# Patient Record
Sex: FEMALE | Race: BLACK OR AFRICAN AMERICAN | NOT HISPANIC OR LATINO | Employment: UNEMPLOYED | URBAN - METROPOLITAN AREA
[De-identification: names, ages, dates, MRNs, and addresses within clinical notes are randomized per-mention and may not be internally consistent; named-entity substitution may affect disease eponyms.]

---
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
## 2022-02-23 ENCOUNTER — OFFICE VISIT (OUTPATIENT)
Dept: URGENT CARE | Facility: CLINIC | Age: 22
End: 2022-02-23
Payer: MEDICAID

## 2022-02-23 VITALS
OXYGEN SATURATION: 99 % | HEIGHT: 62 IN | TEMPERATURE: 98 F | RESPIRATION RATE: 16 BRPM | DIASTOLIC BLOOD PRESSURE: 96 MMHG | BODY MASS INDEX: 53.92 KG/M2 | SYSTOLIC BLOOD PRESSURE: 136 MMHG | HEART RATE: 107 BPM | WEIGHT: 293 LBS

## 2022-02-23 DIAGNOSIS — J30.9 ALLERGIC RHINITIS, UNSPECIFIED SEASONALITY, UNSPECIFIED TRIGGER: Primary | ICD-10-CM

## 2022-02-23 LAB
CTP QC/QA: YES
SARS-COV-2 RDRP RESP QL NAA+PROBE: NEGATIVE

## 2022-02-23 PROCEDURE — 3075F SYST BP GE 130 - 139MM HG: CPT | Mod: CPTII,S$GLB,, | Performed by: NURSE PRACTITIONER

## 2022-02-23 PROCEDURE — 3080F PR MOST RECENT DIASTOLIC BLOOD PRESSURE >= 90 MM HG: ICD-10-PCS | Mod: CPTII,S$GLB,, | Performed by: NURSE PRACTITIONER

## 2022-02-23 PROCEDURE — 1159F MED LIST DOCD IN RCRD: CPT | Mod: CPTII,S$GLB,, | Performed by: NURSE PRACTITIONER

## 2022-02-23 PROCEDURE — 3008F PR BODY MASS INDEX (BMI) DOCUMENTED: ICD-10-PCS | Mod: CPTII,S$GLB,, | Performed by: NURSE PRACTITIONER

## 2022-02-23 PROCEDURE — 3080F DIAST BP >= 90 MM HG: CPT | Mod: CPTII,S$GLB,, | Performed by: NURSE PRACTITIONER

## 2022-02-23 PROCEDURE — 3075F PR MOST RECENT SYSTOLIC BLOOD PRESS GE 130-139MM HG: ICD-10-PCS | Mod: CPTII,S$GLB,, | Performed by: NURSE PRACTITIONER

## 2022-02-23 PROCEDURE — 1160F PR REVIEW ALL MEDS BY PRESCRIBER/CLIN PHARMACIST DOCUMENTED: ICD-10-PCS | Mod: CPTII,S$GLB,, | Performed by: NURSE PRACTITIONER

## 2022-02-23 PROCEDURE — 1160F RVW MEDS BY RX/DR IN RCRD: CPT | Mod: CPTII,S$GLB,, | Performed by: NURSE PRACTITIONER

## 2022-02-23 PROCEDURE — 1159F PR MEDICATION LIST DOCUMENTED IN MEDICAL RECORD: ICD-10-PCS | Mod: CPTII,S$GLB,, | Performed by: NURSE PRACTITIONER

## 2022-02-23 PROCEDURE — 99213 PR OFFICE/OUTPT VISIT, EST, LEVL III, 20-29 MIN: ICD-10-PCS | Mod: S$GLB,,, | Performed by: NURSE PRACTITIONER

## 2022-02-23 PROCEDURE — 99213 OFFICE O/P EST LOW 20 MIN: CPT | Mod: S$GLB,,, | Performed by: NURSE PRACTITIONER

## 2022-02-23 PROCEDURE — 3008F BODY MASS INDEX DOCD: CPT | Mod: CPTII,S$GLB,, | Performed by: NURSE PRACTITIONER

## 2022-02-23 PROCEDURE — U0002 COVID-19 LAB TEST NON-CDC: HCPCS | Mod: QW,S$GLB,, | Performed by: NURSE PRACTITIONER

## 2022-02-23 PROCEDURE — U0002: ICD-10-PCS | Mod: QW,S$GLB,, | Performed by: NURSE PRACTITIONER

## 2022-02-24 NOTE — PROGRESS NOTES
"Subjective:       Patient ID: Kaylie Dangelo is a 21 y.o. female.    Vitals:  height is 5' 2" (1.575 m) and weight is 183.3 kg (404 lb) (abnormal). Her temperature is 98.3 °F (36.8 °C). Her blood pressure is 136/96 (abnormal) and her pulse is 107. Her respiration is 16 and oxygen saturation is 99%.     Chief Complaint: URI    Pt has had cough x 2 days.  Patient states her dad recently passed and to able to live in [facility] house she needs to have negative COVID test.    URI   This is a new problem. The current episode started today. The problem has been unchanged. There has been no fever. Associated symptoms include coughing. She has tried nothing for the symptoms. The treatment provided no relief.       Respiratory: Positive for cough.        Objective:      Physical Exam   Constitutional: She is oriented to person, place, and time. She appears well-developed. She is cooperative.  Non-toxic appearance. She does not appear ill. No distress. obesity  HENT:   Head: Normocephalic and atraumatic.   Ears:   Right Ear: Hearing, tympanic membrane, external ear and ear canal normal.   Left Ear: Hearing, tympanic membrane, external ear and ear canal normal.   Nose: Nose normal. No mucosal edema, rhinorrhea or nasal deformity. No epistaxis. Right sinus exhibits no maxillary sinus tenderness and no frontal sinus tenderness. Left sinus exhibits no maxillary sinus tenderness and no frontal sinus tenderness.   Mouth/Throat: Uvula is midline, oropharynx is clear and moist and mucous membranes are normal. No trismus in the jaw. Normal dentition. No uvula swelling. No oropharyngeal exudate, posterior oropharyngeal edema or posterior oropharyngeal erythema.   Eyes: Conjunctivae and lids are normal. No scleral icterus.   Neck: Trachea normal and phonation normal. Neck supple. No edema present. No erythema present. No neck rigidity present.   Cardiovascular: Normal rate, regular rhythm, normal heart sounds and normal pulses. "   Pulmonary/Chest: Effort normal and breath sounds normal. No respiratory distress. She has no decreased breath sounds. She has no rhonchi.   Lungs clear    Comments: Lungs clear    Abdominal: Normal appearance.   Musculoskeletal: Normal range of motion.         General: No deformity. Normal range of motion.   Neurological: She is alert and oriented to person, place, and time. She exhibits normal muscle tone. Coordination normal.   Skin: Skin is warm, dry, intact, not diaphoretic and not pale.   Psychiatric: Her speech is normal and behavior is normal. Judgment and thought content normal.   Nursing note and vitals reviewed.        Results for orders placed or performed in visit on 02/23/22   POCT COVID-19 Rapid Screening   Result Value Ref Range    POC Rapid COVID Negative Negative     Acceptable Yes        Assessment:       1. Allergic rhinitis, unspecified seasonality, unspecified trigger          Plan:         Allergic rhinitis, unspecified seasonality, unspecified trigger  -     POCT COVID-19 Rapid Screening                    Patient Instructions   START ZYRTEC AND FLONASE    COVID TEST WAS NEGATIVE TODAY IN CLINIC

## 2022-06-24 ENCOUNTER — HOSPITAL ENCOUNTER (EMERGENCY)
Facility: HOSPITAL | Age: 22
Discharge: HOME | End: 2022-06-24
Attending: EMERGENCY MEDICINE
Payer: MEDICAID

## 2022-06-24 VITALS
DIASTOLIC BLOOD PRESSURE: 60 MMHG | HEIGHT: 62 IN | OXYGEN SATURATION: 97 % | RESPIRATION RATE: 18 BRPM | HEART RATE: 85 BPM | SYSTOLIC BLOOD PRESSURE: 127 MMHG | BODY MASS INDEX: 53.92 KG/M2 | TEMPERATURE: 98 F | WEIGHT: 293 LBS

## 2022-06-24 DIAGNOSIS — J03.90 TONSILLITIS: Primary | ICD-10-CM

## 2022-06-24 PROCEDURE — 99284 EMERGENCY DEPT VISIT MOD MDM: CPT

## 2022-06-24 PROCEDURE — 99284 EMERGENCY DEPT VISIT MOD MDM: CPT | Mod: ,,, | Performed by: EMERGENCY MEDICINE

## 2022-06-24 PROCEDURE — 99284 PR EMERGENCY DEPT VISIT,LEVEL IV: ICD-10-PCS | Mod: ,,, | Performed by: EMERGENCY MEDICINE

## 2022-06-24 RX ORDER — PREDNISONE 20 MG/1
40 TABLET ORAL DAILY
Qty: 10 TABLET | Refills: 0 | Status: SHIPPED | OUTPATIENT
Start: 2022-06-24 | End: 2022-06-29

## 2022-06-24 RX ORDER — CLINDAMYCIN HYDROCHLORIDE 150 MG/1
300 CAPSULE ORAL 4 TIMES DAILY
Qty: 56 CAPSULE | Refills: 0 | Status: SHIPPED | OUTPATIENT
Start: 2022-06-24 | End: 2022-07-01

## 2022-06-24 NOTE — ED PROVIDER NOTES
Encounter Date: 6/24/2022    SCRIBE #1 NOTE: I, Shawn Hdz, am scribing for, and in the presence of,  Georges Goel MD. I have scribed the entire note.       History     Chief Complaint   Patient presents with    Abscess     Dx'd w/ throat abscess today at . Sent to the ER for further medical evaluation and needing to be rx'd PO abx. Pt denies difficulty swallowing, choking, N/V.      21 y.o. female with a PMHx of  ADHD, asthma, depression, HTN, presents with a chief complaint of sore throat onset one day ago. She notes she smoked and irritated her throat. She states it is painful to swallow. She presented to an urgent care and was told she had a peritonsillar abscess in her throat. She was referred to the ED for antibiotics and further evaluation. She denies any know allergies. Patient reports no other identifying, alleviating, or exacerbating factors and denies any other associated symptoms at this time.            The history is provided by medical records and the patient. No  was used.     Review of patient's allergies indicates:   Allergen Reactions    Citrus and derivatives     Penicillins     Lamotrigine Rash     Reported by mother on 12/19/18     Past Medical History:   Diagnosis Date    ADHD, predominantly hyperactive-impulsive subtype     Asthma     Bipolar 1 disorder     Depression     Hypertension     Insomnia      History reviewed. No pertinent surgical history.  Family History   Problem Relation Age of Onset    Diabetes Mother     Heart failure Mother      Social History     Tobacco Use    Smoking status: Former Smoker    Smokeless tobacco: Never Used   Substance Use Topics    Alcohol use: No    Drug use: Yes     Types: Marijuana     Review of Systems   Constitutional: Negative for chills and fever.   HENT: Positive for sore throat. Negative for nosebleeds.    Eyes: Negative for visual disturbance.   Respiratory: Negative for shortness of breath.     Cardiovascular: Negative for chest pain.   Gastrointestinal: Negative for vomiting.   Genitourinary: Negative for frequency.   Musculoskeletal: Negative for joint swelling.   Skin: Negative for rash.   Neurological: Negative for numbness.   Hematological: Does not bruise/bleed easily.   Psychiatric/Behavioral: Negative for confusion.       Physical Exam     Initial Vitals [06/24/22 1018]   BP Pulse Resp Temp SpO2   127/60 85 18 98 °F (36.7 °C) 97 %      MAP       --         Physical Exam    Constitutional: She appears well-developed and well-nourished. She is not diaphoretic. No distress.   Patient appears in no acute distress.     HENT:   Head: Normocephalic and atraumatic.   Her speech is clear. She is handling her secretions. She has exudate on her tonsils. No sign of peritonsillar abscess. No swelling to parotid glands.     Eyes: Conjunctivae are normal.   Neck: Neck supple.    She has no tenderness to anterior neck.   Normal range of motion.  Cardiovascular: Normal rate, regular rhythm, normal heart sounds and intact distal pulses.   Pulmonary/Chest: Breath sounds normal. No respiratory distress.   Abdominal: Abdomen is soft. Bowel sounds are normal.   Musculoskeletal:         General: No edema. Normal range of motion.      Cervical back: Normal range of motion and neck supple.     Neurological: She is alert. She has normal strength. GCS eye subscore is 4. GCS verbal subscore is 5. GCS motor subscore is 6.   Skin: Skin is warm. No rash noted.   Psychiatric: She has a normal mood and affect.             ED Course   Procedures  Labs Reviewed - No data to display       Imaging Results    None          Medications - No data to display  Medical Decision Making:   History:   Old Medical Records: I decided to obtain old medical records.  Initial Assessment:   Patient presents with tonsillitis, highly doubt abscess or infection. I will discharge with amoxicillin and steroid.   ED Management:  Plan in assessment is to  discharge patient home with Prednisone 20 mg and clindamycin 150 mg.          Scribe Attestation:   Scribe #1: I performed the above scribed service and the documentation accurately describes the services I performed. I attest to the accuracy of the note.                 Clinical Impression:   Final diagnoses:  [J03.90] Tonsillitis (Primary)          ED Disposition Condition    Discharge Stable        ED Prescriptions     Medication Sig Dispense Start Date End Date Auth. Provider    predniSONE (DELTASONE) 20 MG tablet Take 2 tablets (40 mg total) by mouth once daily. for 5 days 10 tablet 6/24/2022 6/29/2022 Georges Goel MD    clindamycin (CLEOCIN) 150 MG capsule Take 2 capsules (300 mg total) by mouth 4 (four) times daily. for 7 days 56 capsule 6/24/2022 7/1/2022 Georges Goel MD        Follow-up Information     Follow up With Specialties Details Why Contact Info Additional Information    [facility] Internal [facility] Schedule an appointment as soon as possible for a visit in 2 days For Primary Care and Emergency Department Follow-up [id number]  [facility] [id number] [phone] [facility] Please park in surface lot and check in at central registration desk    [facility] Emergency Medicine  If symptoms worsen [id number]  [facility] [id number] [phone]            Georges Goel MD  06/24/22 0632

## 2022-06-24 NOTE — ED NOTES
Patient identifiers verified and correct for MS Dangelo  C/C: Throat pain SEE NN  APPEARANCE: awake and alert in NAD.  SKIN: warm, dry and intact. No breakdown or bruising.  MUSCULOSKELETAL: Patient moving all extremities spontaneously, no obvious swelling or deformities noted. Ambulates independently.  RESPIRATORY: Denies shortness of breath.Respirations unlabored. Denies cough Denies fevers.   CARDIAC: Denies CP, 2+ distal pulses; no peripheral edema  ABDOMEN: S/ND/NT, Denies nausea  : voids spontaneously, denies difficulty  Neurologic: AAO x 4; follows commands equal strength in all extremities; denies numbness/tingling. Denies dizziness  Denies weakness

## 2022-06-24 NOTE — ED NOTES
Patient states abscess to throat, pain with swallowing and eating x 2 days, gargling with peroxide. Denies SOB, denies fevers. No current antibiotics, denies any routine meds.

## 2022-07-25 ENCOUNTER — OFFICE VISIT (OUTPATIENT)
Dept: OBSTETRICS AND GYNECOLOGY | Facility: CLINIC | Age: 22
End: 2022-07-25
Payer: MEDICAID

## 2022-07-25 VITALS — WEIGHT: 206.56 LBS | HEIGHT: 62 IN | BODY MASS INDEX: 38.01 KG/M2

## 2022-07-25 DIAGNOSIS — Z11.3 SCREEN FOR STD (SEXUALLY TRANSMITTED DISEASE): Primary | ICD-10-CM

## 2022-07-25 DIAGNOSIS — Z31.69 PRE-CONCEPTION COUNSELING: ICD-10-CM

## 2022-07-25 LAB
B-HCG UR QL: NEGATIVE
CTP QC/QA: YES

## 2022-07-25 PROCEDURE — 99213 PR OFFICE/OUTPT VISIT, EST, LEVL III, 20-29 MIN: ICD-10-PCS | Mod: S$PBB,,, | Performed by: OBSTETRICS & GYNECOLOGY

## 2022-07-25 PROCEDURE — 1159F MED LIST DOCD IN RCRD: CPT | Mod: CPTII,,, | Performed by: OBSTETRICS & GYNECOLOGY

## 2022-07-25 PROCEDURE — 99213 OFFICE O/P EST LOW 20 MIN: CPT | Mod: PBBFAC | Performed by: OBSTETRICS & GYNECOLOGY

## 2022-07-25 PROCEDURE — 99999 PR PBB SHADOW E&M-EST. PATIENT-LVL III: ICD-10-PCS | Mod: PBBFAC,,, | Performed by: OBSTETRICS & GYNECOLOGY

## 2022-07-25 PROCEDURE — 99999 PR PBB SHADOW E&M-EST. PATIENT-LVL III: CPT | Mod: PBBFAC,,, | Performed by: OBSTETRICS & GYNECOLOGY

## 2022-07-25 PROCEDURE — 1159F PR MEDICATION LIST DOCUMENTED IN MEDICAL RECORD: ICD-10-PCS | Mod: CPTII,,, | Performed by: OBSTETRICS & GYNECOLOGY

## 2022-07-25 PROCEDURE — 1160F RVW MEDS BY RX/DR IN RCRD: CPT | Mod: CPTII,,, | Performed by: OBSTETRICS & GYNECOLOGY

## 2022-07-25 PROCEDURE — 87591 N.GONORRHOEAE DNA AMP PROB: CPT | Performed by: OBSTETRICS & GYNECOLOGY

## 2022-07-25 PROCEDURE — 1160F PR REVIEW ALL MEDS BY PRESCRIBER/CLIN PHARMACIST DOCUMENTED: ICD-10-PCS | Mod: CPTII,,, | Performed by: OBSTETRICS & GYNECOLOGY

## 2022-07-25 PROCEDURE — 3008F BODY MASS INDEX DOCD: CPT | Mod: CPTII,,, | Performed by: OBSTETRICS & GYNECOLOGY

## 2022-07-25 PROCEDURE — 81025 URINE PREGNANCY TEST: CPT | Mod: PBBFAC | Performed by: OBSTETRICS & GYNECOLOGY

## 2022-07-25 PROCEDURE — 99213 OFFICE O/P EST LOW 20 MIN: CPT | Mod: S$PBB,,, | Performed by: OBSTETRICS & GYNECOLOGY

## 2022-07-25 PROCEDURE — 3008F PR BODY MASS INDEX (BMI) DOCUMENTED: ICD-10-PCS | Mod: CPTII,,, | Performed by: OBSTETRICS & GYNECOLOGY

## 2022-07-25 PROCEDURE — 87491 CHLMYD TRACH DNA AMP PROBE: CPT | Performed by: OBSTETRICS & GYNECOLOGY

## 2022-07-25 NOTE — ASSESSMENT & PLAN NOTE
Declines pelvic exam. STD screening via urine today and blood work ordered. UPT negative in clinic.

## 2022-07-25 NOTE — PROGRESS NOTES
Chief Complaint   Patient presents with    Annual Exam       HPI:   21 y.o.  here today for STD check. Recently sexually active for the first time and her most recent cycle was irregular. UPT negative today. Declines a pelvic exam at this time. Denies vaginal discharge, irritation, odor, urinary complaints, or change in bowel habits. Denies major medical problems and not currently taking any medications. Desires pregnancy.    LMP Dates from Last 1 Encounters:   LMP: 2022       Labs / Significant Studies  Pap: N/A    No visits with results within 3 Month(s) from this visit.   Latest known visit with results is:   Office Visit on 2022   Component Date Value Ref Range Status    POC Rapid COVID 2022 Negative  Negative Final     Acceptable 2022 Yes   Final        Past Medical History:   Diagnosis Date    ADHD, predominantly hyperactive-impulsive subtype     Asthma     Bipolar 1 disorder     Depression     Hypertension     Insomnia      History reviewed. No pertinent surgical history.    Current Outpatient Medications:     acetaminophen (TYLENOL) 650 MG TbSR, Take 1 tablet (650 mg total) by mouth every 8 (eight) hours. (Patient not taking: Reported on 2022), Disp: 20 tablet, Rfl: 0    acyclovir (ZOVIRAX) 400 MG tablet, Take 1 tablet (400 mg total) by mouth 4 (four) times daily. for 10 days, Disp: 40 tablet, Rfl: 0    clonidine (CATAPRES) 0.1 MG tablet, Take 0.1 mg by mouth every evening., Disp: , Rfl:     fluoxetine (PROZAC) 40 MG capsule, Take 40 mg by mouth once daily., Disp: , Rfl:     hydrochlorothiazide (HYDRODIURIL) 12.5 MG Tab, Take 12.5 mg by mouth once daily., Disp: , Rfl:     ibuprofen (ADVIL,MOTRIN) 600 MG tablet, Take 1 tablet (600 mg total) by mouth every 6 (six) hours as needed for Pain. (Patient not taking: No sig reported), Disp: 20 tablet, Rfl: 0    lamotrigine (LAMICTAL) 100 MG tablet, Take 100 mg by mouth once daily., Disp: , Rfl:      medroxyPROGESTERone (PROVERA) 10 MG tablet, Take 1 tablet (10 mg total) by mouth once daily., Disp: 10 tablet, Rfl: 0    melatonin 5 mg TbDL, Take by mouth., Disp: , Rfl:     METHYLPHENIDATE HCL (CONCERTA ORAL), Take 45 mg by mouth., Disp: , Rfl:     montelukast (SINGULAIR) 5 MG chewable tablet, Take 5 mg by mouth every evening., Disp: , Rfl:     mupirocin (BACTROBAN) 2 % ointment, Apply topically 3 (three) times daily. (Patient not taking: Reported on 2022), Disp: 30 g, Rfl: 0    paroxetine (PAXIL-CR) 25 MG 24 hr tablet, Take 25 mg by mouth every morning., Disp: , Rfl:   Review of patient's allergies indicates:   Allergen Reactions    Citrus and derivatives     Penicillins     Lamotrigine Rash     Reported by mother on 18     OB History    Para Term  AB Living   0 0 0 0 0 0   SAB IAB Ectopic Multiple Live Births   0 0 0 0 0     Social History     Tobacco Use    Smoking status: Former Smoker    Smokeless tobacco: Never Used   Substance Use Topics    Alcohol use: No    Drug use: Not Currently     Types: Marijuana     Family History   Problem Relation Age of Onset    Diabetes Mother     Heart failure Mother     Breast cancer Neg Hx     Colon cancer Neg Hx     Ovarian cancer Neg Hx        Review of Systems   Negative except as in HPI      Physical Exam   There were no vitals filed for this visit.  Body mass index is 37.78 kg/m².    Physical Exam  Constitutional:       General: She is not in acute distress.     Appearance: Normal appearance.   HENT:      Head: Normocephalic and atraumatic.   Pulmonary:      Effort: Pulmonary effort is normal.   Musculoskeletal:         General: Normal range of motion.      Cervical back: Normal range of motion.   Neurological:      General: No focal deficit present.      Mental Status: She is alert and oriented to person, place, and time.   Skin:     General: Skin is warm and dry.   Psychiatric:         Mood and Affect: Mood normal.          Behavior: Behavior normal.         Thought Content: Thought content normal.          Labs reviewed: TSH, prolactin, DHEA, 17-OHP, testosterone    ASSESSMENT:   Patient Active Problem List   Diagnosis    Depression    Bipolar 1 disorder    Abnormal weight gain    Acanthosis nigricans    Suspected sleep apnea    Obstructive sleep apnea syndrome    Screen for STD (sexually transmitted disease)    Pre-conception counseling       PLAN:  Problem List Items Addressed This Visit        Renal/    Pre-conception counseling    Current Assessment & Plan     Patient instructed to track cycles and have timed intercourse if attempting to conceive. Start a prenatal vitamin, avoid tobacco, alcohol, and other substance, eat a well-balanced diet with lots of fruits, vegetables, and lean meats, and exercise 5 times weekly for 30 minutes per day.               ID    Screen for STD (sexually transmitted disease) - Primary    Current Assessment & Plan     Declines pelvic exam. STD screening via urine today and blood work ordered. UPT negative in clinic.            Relevant Orders    POCT Urine Pregnancy (Completed)    C. trachomatis/N. gonorrhoeae by AMP DNA    HIV 1/2 Ag/Ab (4th Gen)    RPR    Hepatitis C Antibody    Hepatitis B Surface Antigen           Total time spent on this encounter was 20 minutes.  This includes preparing to see the patient;  obtaining/reviewing separately obtained history;  performing a medical exam and/or evaluation;   counseling/educating the patient;  ordering medications, tests, of procedures;   referring/communicating with other health care professionals;  EMR documentation;  interpreting/communicating results to the patient;  and/or care coordination.    Follow up if symptoms worsen or fail to improve.       Ladonna Tsai MD  Department of Obstetrics & Gynecology  [facility]

## 2022-07-25 NOTE — ASSESSMENT & PLAN NOTE
Patient instructed to track cycles and have timed intercourse if attempting to conceive. Start a prenatal vitamin, avoid tobacco, alcohol, and other substance, eat a well-balanced diet with lots of fruits, vegetables, and lean meats, and exercise 5 times weekly for 30 minutes per day.

## 2022-07-28 LAB
C TRACH DNA SPEC QL NAA+PROBE: NOT DETECTED
N GONORRHOEA DNA SPEC QL NAA+PROBE: NOT DETECTED

## 2022-08-05 ENCOUNTER — TELEPHONE (OUTPATIENT)
Dept: OBSTETRICS AND GYNECOLOGY | Facility: CLINIC | Age: 22
End: 2022-08-05
Payer: MEDICAID

## 2022-08-05 DIAGNOSIS — Z34.90 PREGNANCY: Primary | ICD-10-CM

## 2022-08-18 ENCOUNTER — HOSPITAL ENCOUNTER (OUTPATIENT)
Dept: PERINATAL CARE | Facility: OTHER | Age: 22
Discharge: HOME | End: 2022-08-18
Attending: OBSTETRICS & GYNECOLOGY
Payer: MEDICAID

## 2022-08-18 ENCOUNTER — OFFICE VISIT (OUTPATIENT)
Dept: OBSTETRICS AND GYNECOLOGY | Facility: CLINIC | Age: 22
End: 2022-08-18
Payer: MEDICAID

## 2022-08-18 VITALS
WEIGHT: 293 LBS | BODY MASS INDEX: 53.92 KG/M2 | SYSTOLIC BLOOD PRESSURE: 127 MMHG | DIASTOLIC BLOOD PRESSURE: 60 MMHG | HEIGHT: 62 IN

## 2022-08-18 DIAGNOSIS — Z34.90 PREGNANCY: ICD-10-CM

## 2022-08-18 DIAGNOSIS — Z36.82 ENCOUNTER FOR ANTENATAL SCREENING FOR NUCHAL TRANSLUCENCY: ICD-10-CM

## 2022-08-18 DIAGNOSIS — N91.2 AMENORRHEA: Primary | ICD-10-CM

## 2022-08-18 LAB
B-HCG UR QL: POSITIVE
CTP QC/QA: YES

## 2022-08-18 PROCEDURE — 87086 URINE CULTURE/COLONY COUNT: CPT | Performed by: REGISTERED NURSE

## 2022-08-18 PROCEDURE — 1159F MED LIST DOCD IN RCRD: CPT | Mod: CPTII,,, | Performed by: REGISTERED NURSE

## 2022-08-18 PROCEDURE — 87591 N.GONORRHOEAE DNA AMP PROB: CPT | Performed by: REGISTERED NURSE

## 2022-08-18 PROCEDURE — 3078F PR MOST RECENT DIASTOLIC BLOOD PRESSURE < 80 MM HG: ICD-10-PCS | Mod: CPTII,,, | Performed by: REGISTERED NURSE

## 2022-08-18 PROCEDURE — 1159F PR MEDICATION LIST DOCUMENTED IN MEDICAL RECORD: ICD-10-PCS | Mod: CPTII,,, | Performed by: REGISTERED NURSE

## 2022-08-18 PROCEDURE — 3078F DIAST BP <80 MM HG: CPT | Mod: CPTII,,, | Performed by: REGISTERED NURSE

## 2022-08-18 PROCEDURE — 99213 PR OFFICE/OUTPT VISIT, EST, LEVL III, 20-29 MIN: ICD-10-PCS | Mod: S$PBB,TH,, | Performed by: REGISTERED NURSE

## 2022-08-18 PROCEDURE — 81025 URINE PREGNANCY TEST: CPT | Mod: PBBFAC

## 2022-08-18 PROCEDURE — 76801 OB US < 14 WKS SINGLE FETUS: CPT | Mod: 26,,, | Performed by: OBSTETRICS & GYNECOLOGY

## 2022-08-18 PROCEDURE — 76801 US OB/GYN PROCEDURE (VIEWPOINT): ICD-10-PCS | Mod: 26,,, | Performed by: OBSTETRICS & GYNECOLOGY

## 2022-08-18 PROCEDURE — 3008F BODY MASS INDEX DOCD: CPT | Mod: CPTII,,, | Performed by: REGISTERED NURSE

## 2022-08-18 PROCEDURE — 99213 OFFICE O/P EST LOW 20 MIN: CPT | Mod: PBBFAC,25,TH

## 2022-08-18 PROCEDURE — 99999 PR PBB SHADOW E&M-EST. PATIENT-LVL III: ICD-10-PCS | Mod: PBBFAC,,,

## 2022-08-18 PROCEDURE — 99999 PR PBB SHADOW E&M-EST. PATIENT-LVL III: CPT | Mod: PBBFAC,,,

## 2022-08-18 PROCEDURE — 76801 OB US < 14 WKS SINGLE FETUS: CPT

## 2022-08-18 PROCEDURE — 3074F PR MOST RECENT SYSTOLIC BLOOD PRESSURE < 130 MM HG: ICD-10-PCS | Mod: CPTII,,, | Performed by: REGISTERED NURSE

## 2022-08-18 PROCEDURE — 1160F RVW MEDS BY RX/DR IN RCRD: CPT | Mod: CPTII,,, | Performed by: REGISTERED NURSE

## 2022-08-18 PROCEDURE — 99213 OFFICE O/P EST LOW 20 MIN: CPT | Mod: S$PBB,TH,, | Performed by: REGISTERED NURSE

## 2022-08-18 PROCEDURE — 1160F PR REVIEW ALL MEDS BY PRESCRIBER/CLIN PHARMACIST DOCUMENTED: ICD-10-PCS | Mod: CPTII,,, | Performed by: REGISTERED NURSE

## 2022-08-18 PROCEDURE — 3008F PR BODY MASS INDEX (BMI) DOCUMENTED: ICD-10-PCS | Mod: CPTII,,, | Performed by: REGISTERED NURSE

## 2022-08-18 PROCEDURE — 3074F SYST BP LT 130 MM HG: CPT | Mod: CPTII,,, | Performed by: REGISTERED NURSE

## 2022-08-18 PROCEDURE — 87491 CHLMYD TRACH DNA AMP PROBE: CPT | Performed by: REGISTERED NURSE

## 2022-08-18 NOTE — PROGRESS NOTES
CC: Positive Pregnancy Test    HISTORY OF PRESENT ILLNESS:    Kaylie Dangelo is a 21 y.o. female, ,  Presents today for a routine exam complaining of amenorrhea and positive home urine pregnancy test.  Patient's last menstrual period was 2022 (exact date).   She is not currently on any contraception.  Reports nausea. Reports breast tenderness. Denies vaginal bleeding and pelvic pain. She reports that she is not currently taking any medications. She reports history of ADHD, depression and asthma as a youth, which have all resolved. She does report sleep apnea currently. Denies past surgical hx. She reports her older brother was born with a heart murmur (no surgery). This is her first pregnancy.     Abdominal ultrasound prior to this visit showed (pt declines TVUS):   A probable intrauterine gestational sac is seen. The mean sac diameter (MSD) is 16.1 mm. Neither a yolk sac nor an embryo is seen within the gestational sac. At a MSD   of less than 25 mm, the absence of a yolk sac and embryo may not be pathologic.   The tentative diagnosis, based on ultrasound findings alone, is a pregnancy of uncertain viability.   Other clinical data, as available, should be used in conjunction with ultrasound findings to make a final determination regarding viability.   If a follow-up study will be ordered to assess viability and to assign REGINA, recommend that this study not be done any sooner than 2 weeks from the date of today's exam.   The maternal adnexae are normal in appearance. The amount of free fluid seen in the pelvis is within normal physiologic range.       ROS:  GENERAL: No weight changes. No swelling. No fatigue. No fever.  CARDIOVASCULAR: No chest pain. No shortness of breath. No leg cramps.   NEUROLOGICAL: No headaches. No vision changes.  BREASTS: No pain. No lumps. No discharge.  ABDOMEN: No pain. No diarrhea. No constipation.  REPRODUCTIVE: No abnormal bleeding.   VULVA: No pain. No lesions. No  "itching.  VAGINA: No relaxation. No itching. No odor. No discharge. No lesions.  URINARY: No incontinence. No nocturia. No frequency. No dysuria.    MEDICATIONS AND ALLERGIES:  Reviewed        COMPREHENSIVE GYN HISTORY:  PAP History: Denies abnormal Paps.  Infection History: Denies STDs. Denies PID.  Benign History: Denies uterine fibroids. Denies ovarian cysts. Denies endometriosis. Denies other conditions.  Cancer History: Denies cervical cancer. Denies uterine cancer or hyperplasia. Denies ovarian cancer. Denies vulvar cancer or pre-cancer. Denies vaginal cancer or pre-cancer. Denies breast cancer. Denies colon cancer.  Sexual Activity History: Reports currently being sexually active  Menstrual History: None.  Contraception: None    /60   Ht 5' 2" (1.575 m)   Wt (!) 168.3 kg (371 lb 0.6 oz)   LMP 2022 (Exact Date)   BMI 67.86 kg/m²     PE:  AFFECT: Calm, alert and oriented X 3. Interactive during exam  GENERAL: Appears well-nourished, well-developed, in no acute distress.  PELVIC: Pt declines exam.    PROCEDURES:  UPT Positive  Genprobe  Pap not indicated due to patient's age.      ASSESSMENT/PLAN:  Amenorrhea  Positive urine pregnancy test (REGINA: 23, EGA: 4w1d based on LMP)    -  Routine prenatal care    Nausea and vomiting in pregnancy    -  Education regarding lifestyle and dietary modifications    -  Advised use of B6/Unisom. Pt will notify us if no relief/worsening symptoms, will consider Zofran if needed.      1st TRIMESTER COUNSELING:   Common complaints of pregnancy  HIV and other routine prenatal tests including  genetic screening  Risk factors identified by prenatal history  Oriented to practice - discussed anticipated course of prenatal care & indications for Ultrasound  Childbirth classes/Hospital facilities   Nutrition and weight gain counseling  Toxoplasmosis precautions (Cats/Raw Meat)  Sexual activity and exercise  Environmental/Work hazards  Travel  Tobacco (Ask, " "Advise, Assess, Assist, and Arrange), as well as alcohol and drug use  Use of any medications (Including supplements, Vitamins, Herbs, or OTC Drugs)  Domestic violence  Seat belt use      TERATOLOGY COUNSELING:   Discussed indications and options for aneuploidy screening - pamphlets given    -  Pt desires NT and orders placed  Dating US prior to this appt; see results under "Imaging"  FOLLOW-UP in 4 weeks with TIERNEY Briggs    "

## 2022-08-19 ENCOUNTER — LAB VISIT (OUTPATIENT)
Dept: LAB | Facility: OTHER | Age: 22
End: 2022-08-19
Attending: REGISTERED NURSE
Payer: MEDICAID

## 2022-08-19 DIAGNOSIS — N91.2 AMENORRHEA: ICD-10-CM

## 2022-08-19 LAB
ABO + RH BLD: NORMAL
BASOPHILS # BLD AUTO: 0.01 K/UL (ref 0–0.2)
BASOPHILS NFR BLD: 0.2 % (ref 0–1.9)
BLD GP AB SCN CELLS X3 SERPL QL: NORMAL
DIFFERENTIAL METHOD: ABNORMAL
EOSINOPHIL # BLD AUTO: 0 K/UL (ref 0–0.5)
EOSINOPHIL NFR BLD: 0.5 % (ref 0–8)
ERYTHROCYTE [DISTWIDTH] IN BLOOD BY AUTOMATED COUNT: 13.1 % (ref 11.5–14.5)
HCG INTACT+B SERPL-ACNC: NORMAL MIU/ML
HCT VFR BLD AUTO: 36.2 % (ref 37–48.5)
HGB BLD-MCNC: 12.2 G/DL (ref 12–16)
IMM GRANULOCYTES # BLD AUTO: 0.01 K/UL (ref 0–0.04)
IMM GRANULOCYTES NFR BLD AUTO: 0.2 % (ref 0–0.5)
LYMPHOCYTES # BLD AUTO: 2.2 K/UL (ref 1–4.8)
LYMPHOCYTES NFR BLD: 34.3 % (ref 18–48)
MCH RBC QN AUTO: 27.5 PG (ref 27–31)
MCHC RBC AUTO-ENTMCNC: 33.7 G/DL (ref 32–36)
MCV RBC AUTO: 82 FL (ref 82–98)
MONOCYTES # BLD AUTO: 0.5 K/UL (ref 0.3–1)
MONOCYTES NFR BLD: 8.4 % (ref 4–15)
NEUTROPHILS # BLD AUTO: 3.6 K/UL (ref 1.8–7.7)
NEUTROPHILS NFR BLD: 56.4 % (ref 38–73)
NRBC BLD-RTO: 0 /100 WBC
PLATELET # BLD AUTO: 372 K/UL (ref 150–450)
PMV BLD AUTO: 10.1 FL (ref 9.2–12.9)
PROGEST SERPL-MCNC: 17 NG/ML
RBC # BLD AUTO: 4.43 M/UL (ref 4–5.4)
WBC # BLD AUTO: 6.32 K/UL (ref 3.9–12.7)

## 2022-08-19 PROCEDURE — 86592 SYPHILIS TEST NON-TREP QUAL: CPT | Performed by: REGISTERED NURSE

## 2022-08-19 PROCEDURE — 84144 ASSAY OF PROGESTERONE: CPT | Performed by: REGISTERED NURSE

## 2022-08-19 PROCEDURE — 87389 HIV-1 AG W/HIV-1&-2 AB AG IA: CPT | Performed by: REGISTERED NURSE

## 2022-08-19 PROCEDURE — 85025 COMPLETE CBC W/AUTO DIFF WBC: CPT | Performed by: REGISTERED NURSE

## 2022-08-19 PROCEDURE — 86803 HEPATITIS C AB TEST: CPT | Performed by: REGISTERED NURSE

## 2022-08-19 PROCEDURE — 83020 HEMOGLOBIN ELECTROPHORESIS: CPT | Performed by: REGISTERED NURSE

## 2022-08-19 PROCEDURE — 84702 CHORIONIC GONADOTROPIN TEST: CPT | Performed by: REGISTERED NURSE

## 2022-08-19 PROCEDURE — 86787 VARICELLA-ZOSTER ANTIBODY: CPT | Performed by: REGISTERED NURSE

## 2022-08-19 PROCEDURE — 86762 RUBELLA ANTIBODY: CPT | Performed by: REGISTERED NURSE

## 2022-08-19 PROCEDURE — 87340 HEPATITIS B SURFACE AG IA: CPT | Performed by: REGISTERED NURSE

## 2022-08-19 PROCEDURE — 86850 RBC ANTIBODY SCREEN: CPT | Performed by: REGISTERED NURSE

## 2022-08-19 PROCEDURE — 36415 COLL VENOUS BLD VENIPUNCTURE: CPT | Performed by: REGISTERED NURSE

## 2022-08-20 LAB
BACTERIA UR CULT: NORMAL
BACTERIA UR CULT: NORMAL

## 2022-08-22 LAB
C TRACH DNA SPEC QL NAA+PROBE: NOT DETECTED
HBV SURFACE AG SERPL QL IA: NEGATIVE
HCV AB SERPL QL IA: NEGATIVE
HGB A2 MFR BLD HPLC: 2.8 % (ref 2.2–3.2)
HGB FRACT BLD ELPH-IMP: NORMAL
HGB FRACT BLD ELPH-IMP: NORMAL
HIV 1+2 AB+HIV1 P24 AG SERPL QL IA: NEGATIVE
N GONORRHOEA DNA SPEC QL NAA+PROBE: NOT DETECTED
RPR SER QL: NORMAL
RUBV IGG SER-ACNC: 20.1 IU/ML
RUBV IGG SER-IMP: REACTIVE

## 2022-08-23 LAB
VARICELLA INTERPRETATION: POSITIVE
VARICELLA ZOSTER IGG: 3 ISR (ref 0–0.9)

## 2022-08-27 ENCOUNTER — HOSPITAL ENCOUNTER (EMERGENCY)
Facility: HOSPITAL | Age: 22
Discharge: HOME | End: 2022-08-27
Attending: EMERGENCY MEDICINE
Payer: MEDICAID

## 2022-08-27 VITALS
RESPIRATION RATE: 16 BRPM | HEART RATE: 85 BPM | TEMPERATURE: 98 F | OXYGEN SATURATION: 98 % | SYSTOLIC BLOOD PRESSURE: 124 MMHG | DIASTOLIC BLOOD PRESSURE: 85 MMHG

## 2022-08-27 DIAGNOSIS — R09.89 CHEST CONGESTION: Primary | ICD-10-CM

## 2022-08-27 PROBLEM — L30.9 ECZEMA: Status: ACTIVE | Noted: 2018-12-19

## 2022-08-27 LAB — SARS-COV-2 RDRP RESP QL NAA+PROBE: NEGATIVE

## 2022-08-27 PROCEDURE — 99284 EMERGENCY DEPT VISIT MOD MDM: CPT | Mod: CS,,, | Performed by: PHYSICIAN ASSISTANT

## 2022-08-27 PROCEDURE — 25000003 PHARM REV CODE 250: Performed by: PHYSICIAN ASSISTANT

## 2022-08-27 PROCEDURE — 99284 PR EMERGENCY DEPT VISIT,LEVEL IV: ICD-10-PCS | Mod: CS,,, | Performed by: PHYSICIAN ASSISTANT

## 2022-08-27 PROCEDURE — 99283 EMERGENCY DEPT VISIT LOW MDM: CPT

## 2022-08-27 PROCEDURE — U0002 COVID-19 LAB TEST NON-CDC: HCPCS | Performed by: PHYSICIAN ASSISTANT

## 2022-08-27 RX ORDER — ACYCLOVIR 400 MG/1
400 TABLET ORAL
COMMUNITY
Start: 2021-12-07 | End: 2023-03-10

## 2022-08-27 RX ORDER — CETIRIZINE HYDROCHLORIDE 5 MG/1
10 TABLET ORAL
Status: COMPLETED | OUTPATIENT
Start: 2022-08-27 | End: 2022-08-27

## 2022-08-27 RX ORDER — CETIRIZINE HYDROCHLORIDE 10 MG/1
10 TABLET ORAL DAILY PRN
Qty: 7 TABLET | Refills: 0 | Status: SHIPPED | OUTPATIENT
Start: 2022-08-27 | End: 2023-03-10

## 2022-08-27 RX ADMIN — CETIRIZINE HYDROCHLORIDE 10 MG: 5 TABLET, FILM COATED ORAL at 11:08

## 2022-08-27 NOTE — ED NOTES
"Patient reports "lots of mucous from chest"  starting Monday. Denies CP, SOB. Denies runny nose, throat pain.    AAOx4. Even and unlabored respirations noted. Ambulatory to intake.   "

## 2022-08-27 NOTE — DISCHARGE INSTRUCTIONS
Diagnosis: Chest congestion    Your COVID test is in process.  Your receive your results on your MyChart and I will call you if they are positive.  Prescribing medicine that you can take to help with your congestion.  I would also try over the counter Vicks vapor rub on your chest.    Please schedule an appointment with your primary care doctor for follow-up. If you start to have any new or worsening symptoms, please come back to the emergency department.

## 2022-08-31 ENCOUNTER — PATIENT MESSAGE (OUTPATIENT)
Dept: MATERNAL FETAL MEDICINE | Facility: CLINIC | Age: 22
End: 2022-08-31
Payer: MEDICAID

## 2022-09-01 ENCOUNTER — PROCEDURE VISIT (OUTPATIENT)
Dept: MATERNAL FETAL MEDICINE | Facility: CLINIC | Age: 22
End: 2022-09-01
Payer: MEDICAID

## 2022-09-01 DIAGNOSIS — Z36.82 ENCOUNTER FOR ANTENATAL SCREENING FOR NUCHAL TRANSLUCENCY: ICD-10-CM

## 2022-09-01 DIAGNOSIS — Z36.89 ENCOUNTER FOR FETAL ANATOMIC SURVEY: Primary | ICD-10-CM

## 2022-09-01 DIAGNOSIS — N91.2 AMENORRHEA: ICD-10-CM

## 2022-09-01 PROCEDURE — 76801 US MFM PROCEDURE (VIEWPOINT): ICD-10-PCS | Mod: 26,S$PBB,, | Performed by: OBSTETRICS & GYNECOLOGY

## 2022-09-01 PROCEDURE — 76801 OB US < 14 WKS SINGLE FETUS: CPT | Mod: PBBFAC | Performed by: OBSTETRICS & GYNECOLOGY

## 2022-09-16 ENCOUNTER — TELEPHONE (OUTPATIENT)
Dept: OBSTETRICS AND GYNECOLOGY | Facility: CLINIC | Age: 22
End: 2022-09-16
Payer: MEDICAID

## 2022-09-16 NOTE — LETTER
September 16, 2022    Kaylie Dangelo  [address] [address]              [facility]  [address] [place] [address]  Phone: [phone]      To Whom It May Concern:    Ms. Dangelo is currently under our care for pregnancy.  Estimated Date of Delivery: 04/15/2023    Any elective dental work should preferably be scheduled during or after the mid-trimester or postpartum.    Dental procedures can be performed with local anesthesia without epinephrine. Recommended antibiotics include penicillins, erythromycin, and cephalosporins. Tylenol #3 or Percocet may be used for pain control. No x-rays are allowed for routine screening. For dental problems, up to four x-rays may be taken with proper abdominal shield.    Sincerely,    Tania Crews MA

## 2022-09-16 NOTE — TELEPHONE ENCOUNTER
----- Message from Lois Lebron sent at 9/16/2022 11:26 AM CDT -----  Contact: SHAYNE VANCE [[id number]]  Type: Call Back      Who called: SHAYNE VANCE [[id number]]      What is the request in detail: Patient is requesting a call back. Pt states that she has a severe tooth ache and she is unable to get any antibiotics from her dentist without a clearance. She would like to know if she can get a prescription for the pain. Please advise.       Can the clinic reply by [facility]? No      Would the patient rather a call back or a response via [facility]? Call back      Best call back number: [phone] (mobile)      Additional Information:

## 2022-09-16 NOTE — TELEPHONE ENCOUNTER
Spoke with pt in regards to really bad toothache and not wanting to take too much tylenol.   Dentist wont be able to see her until two weeks from now and informed her that OB should be able to send in antibiotic until pt can be seen.   Dental clearance sent to pt via portal   Encounter routed to OB in regards to antibiotic needed.  Pt is interested in doing MT21 at next OB visit

## 2022-09-21 ENCOUNTER — PATIENT MESSAGE (OUTPATIENT)
Dept: OBSTETRICS AND GYNECOLOGY | Facility: CLINIC | Age: 22
End: 2022-09-21
Payer: MEDICAID

## 2022-09-30 ENCOUNTER — PATIENT MESSAGE (OUTPATIENT)
Dept: MATERNAL FETAL MEDICINE | Facility: CLINIC | Age: 22
End: 2022-09-30
Payer: MEDICAID

## 2022-10-03 ENCOUNTER — PROCEDURE VISIT (OUTPATIENT)
Dept: MATERNAL FETAL MEDICINE | Facility: CLINIC | Age: 22
End: 2022-10-03
Payer: MEDICAID

## 2022-10-03 VITALS — WEIGHT: 293 LBS | BODY MASS INDEX: 65.73 KG/M2

## 2022-10-03 DIAGNOSIS — Z36.89 ENCOUNTER FOR FETAL ANATOMIC SURVEY: Primary | ICD-10-CM

## 2022-10-03 DIAGNOSIS — Z36.82 ENCOUNTER FOR ANTENATAL SCREENING FOR NUCHAL TRANSLUCENCY: ICD-10-CM

## 2022-10-03 PROCEDURE — 76813 US MFM PROCEDURE (VIEWPOINT): ICD-10-PCS | Mod: 26,S$PBB,, | Performed by: OBSTETRICS & GYNECOLOGY

## 2022-10-03 PROCEDURE — 76813 OB US NUCHAL MEAS 1 GEST: CPT | Mod: PBBFAC | Performed by: OBSTETRICS & GYNECOLOGY

## 2022-11-11 ENCOUNTER — NURSE TRIAGE (OUTPATIENT)
Dept: ADMINISTRATIVE | Facility: CLINIC | Age: 22
End: 2022-11-11
Payer: MEDICAID

## 2022-11-11 ENCOUNTER — HOSPITAL ENCOUNTER (EMERGENCY)
Facility: HOSPITAL | Age: 22
Discharge: LEFT BEFORE DISCHARGE | End: 2022-11-11
Attending: EMERGENCY MEDICINE
Payer: MEDICAID

## 2022-11-11 VITALS
HEIGHT: 62 IN | HEART RATE: 92 BPM | OXYGEN SATURATION: 98 % | BODY MASS INDEX: 53.92 KG/M2 | TEMPERATURE: 98 F | SYSTOLIC BLOOD PRESSURE: 133 MMHG | RESPIRATION RATE: 20 BRPM | DIASTOLIC BLOOD PRESSURE: 92 MMHG | WEIGHT: 293 LBS

## 2022-11-11 DIAGNOSIS — Z34.90 PREGNANCY, UNSPECIFIED GESTATIONAL AGE: Primary | ICD-10-CM

## 2022-11-11 DIAGNOSIS — R03.0 ELEVATED BLOOD PRESSURE READING: ICD-10-CM

## 2022-11-11 LAB
ALBUMIN SERPL BCP-MCNC: 3.1 G/DL (ref 3.5–5.2)
ALP SERPL-CCNC: 64 U/L (ref 55–135)
ALT SERPL W/O P-5'-P-CCNC: 211 U/L (ref 10–44)
ANION GAP SERPL CALC-SCNC: 11 MMOL/L (ref 8–16)
AST SERPL-CCNC: 134 U/L (ref 10–40)
BACTERIA #/AREA URNS AUTO: ABNORMAL /HPF
BASOPHILS # BLD AUTO: 0.01 K/UL (ref 0–0.2)
BASOPHILS NFR BLD: 0.2 % (ref 0–1.9)
BILIRUB SERPL-MCNC: 0.8 MG/DL (ref 0.1–1)
BILIRUB UR QL STRIP: NEGATIVE
BUN SERPL-MCNC: 6 MG/DL (ref 6–20)
CALCIUM SERPL-MCNC: 9.6 MG/DL (ref 8.7–10.5)
CHLORIDE SERPL-SCNC: 107 MMOL/L (ref 95–110)
CLARITY UR REFRACT.AUTO: ABNORMAL
CO2 SERPL-SCNC: 20 MMOL/L (ref 23–29)
COLOR UR AUTO: ABNORMAL
CREAT SERPL-MCNC: 0.6 MG/DL (ref 0.5–1.4)
DIFFERENTIAL METHOD: ABNORMAL
EOSINOPHIL # BLD AUTO: 0 K/UL (ref 0–0.5)
EOSINOPHIL NFR BLD: 0.2 % (ref 0–8)
ERYTHROCYTE [DISTWIDTH] IN BLOOD BY AUTOMATED COUNT: 12.9 % (ref 11.5–14.5)
EST. GFR  (NO RACE VARIABLE): >60 ML/MIN/1.73 M^2
GLUCOSE SERPL-MCNC: 88 MG/DL (ref 70–110)
GLUCOSE UR QL STRIP: NEGATIVE
HCT VFR BLD AUTO: 35.8 % (ref 37–48.5)
HGB BLD-MCNC: 12.1 G/DL (ref 12–16)
HGB UR QL STRIP: NEGATIVE
HYALINE CASTS UR QL AUTO: 0 /LPF
IMM GRANULOCYTES # BLD AUTO: 0.03 K/UL (ref 0–0.04)
IMM GRANULOCYTES NFR BLD AUTO: 0.5 % (ref 0–0.5)
KETONES UR QL STRIP: ABNORMAL
LEUKOCYTE ESTERASE UR QL STRIP: NEGATIVE
LYMPHOCYTES # BLD AUTO: 1.6 K/UL (ref 1–4.8)
LYMPHOCYTES NFR BLD: 24.7 % (ref 18–48)
MCH RBC QN AUTO: 28.1 PG (ref 27–31)
MCHC RBC AUTO-ENTMCNC: 33.8 G/DL (ref 32–36)
MCV RBC AUTO: 83 FL (ref 82–98)
MICROSCOPIC COMMENT: ABNORMAL
MONOCYTES # BLD AUTO: 0.5 K/UL (ref 0.3–1)
MONOCYTES NFR BLD: 8.1 % (ref 4–15)
NEUTROPHILS # BLD AUTO: 4.4 K/UL (ref 1.8–7.7)
NEUTROPHILS NFR BLD: 66.3 % (ref 38–73)
NITRITE UR QL STRIP: NEGATIVE
NRBC BLD-RTO: 0 /100 WBC
PH UR STRIP: 5 [PH] (ref 5–8)
PLATELET # BLD AUTO: 258 K/UL (ref 150–450)
PMV BLD AUTO: 10.6 FL (ref 9.2–12.9)
POTASSIUM SERPL-SCNC: 3.5 MMOL/L (ref 3.5–5.1)
PROT SERPL-MCNC: 7.2 G/DL (ref 6–8.4)
PROT UR QL STRIP: ABNORMAL
RBC # BLD AUTO: 4.31 M/UL (ref 4–5.4)
RBC #/AREA URNS AUTO: 2 /HPF (ref 0–4)
SODIUM SERPL-SCNC: 138 MMOL/L (ref 136–145)
SP GR UR STRIP: 1.03 (ref 1–1.03)
SQUAMOUS #/AREA URNS AUTO: 83 /HPF
URN SPEC COLLECT METH UR: ABNORMAL
WBC # BLD AUTO: 6.64 K/UL (ref 3.9–12.7)
WBC #/AREA URNS AUTO: 4 /HPF (ref 0–5)

## 2022-11-11 PROCEDURE — 76815 PR  US,PREGNANT UTERUS,LIMITED, 1/> FETUSES: ICD-10-PCS | Mod: 26,,, | Performed by: PHYSICIAN ASSISTANT

## 2022-11-11 PROCEDURE — 99285 PR EMERGENCY DEPT VISIT,LEVEL V: ICD-10-PCS | Mod: 25,,, | Performed by: PHYSICIAN ASSISTANT

## 2022-11-11 PROCEDURE — 99285 EMERGENCY DEPT VISIT HI MDM: CPT | Mod: 25,,, | Performed by: PHYSICIAN ASSISTANT

## 2022-11-11 PROCEDURE — 99283 EMERGENCY DEPT VISIT LOW MDM: CPT | Mod: 25

## 2022-11-11 PROCEDURE — 81001 URINALYSIS AUTO W/SCOPE: CPT | Performed by: PHYSICIAN ASSISTANT

## 2022-11-11 PROCEDURE — 80053 COMPREHEN METABOLIC PANEL: CPT | Performed by: PHYSICIAN ASSISTANT

## 2022-11-11 PROCEDURE — 76815 OB US LIMITED FETUS(S): CPT | Mod: 26,,, | Performed by: PHYSICIAN ASSISTANT

## 2022-11-11 PROCEDURE — 85025 COMPLETE CBC W/AUTO DIFF WBC: CPT | Performed by: PHYSICIAN ASSISTANT

## 2022-11-11 NOTE — ED NOTES
Pt expressed wish to leave AMA. MD and RN educated pt on risks of leaving AMA, pt stated understanding.

## 2022-11-11 NOTE — ED PROVIDER NOTES
"Encounter Date: 2022       History     Chief Complaint   Patient presents with    Hypertension     Blood pressure high when in penitentiary t[place], pregnant 17 weeks I want to check on my baby,states having panic attack     23 y/o  currently 17w6d pregnant presents to the ED c/o elevated BP.  She was recently in penitentiary for 3 days, states that her blood pressure was elevated while she was there.  She is followed by OBGYN, Dr. Tsai and has an appointment scheduled for next week.  Since becoming pregnant, she reports depression, crying often.  Denies SI/HI - plans to make an appt next week with a counselor or psychiatrist. She reports abdominal "tightness". Denies f/c, chest pain, SOB, vaginal bleeding, vaginal discharge, dysuria, hematuria. She is safe at home.     The history is provided by the patient.   Review of patient's allergies indicates:   Allergen Reactions    Citrus and derivatives     Penicillins     Lamotrigine Rash     Reported by mother on 18     Past Medical History:   Diagnosis Date    ADHD, predominantly hyperactive-impulsive subtype     Asthma     Bipolar 1 disorder     Depression     Hypertension     Insomnia      History reviewed. No pertinent surgical history.  Family History   Problem Relation Age of Onset    Diabetes Mother     Heart failure Mother     Breast cancer Neg Hx     Colon cancer Neg Hx     Ovarian cancer Neg Hx      Social History     Tobacco Use    Smoking status: Former    Smokeless tobacco: Never   Substance Use Topics    Alcohol use: No    Drug use: Not Currently     Types: Marijuana     Review of Systems   Constitutional:  Negative for chills and fever.   HENT:  Negative for congestion and sore throat.    Respiratory:  Negative for cough and shortness of breath.    Cardiovascular:  Negative for chest pain.   Gastrointestinal:  Positive for nausea. Negative for abdominal pain, constipation, diarrhea and vomiting.        +abdominal "tightness"   Genitourinary:  Negative for " dysuria, hematuria, vaginal bleeding and vaginal discharge.   Musculoskeletal:  Negative for back pain.   Skin:  Negative for rash.   Neurological:  Negative for weakness and headaches.   Psychiatric/Behavioral:  Negative for confusion, self-injury and suicidal ideas. The patient is nervous/anxious.      Physical Exam     Initial Vitals [11/11/22 0954]   BP Pulse Resp Temp SpO2   (!) 140/90 (!) 120 18 98.3 °F (36.8 °C) 100 %      MAP       --         Physical Exam    Nursing note and vitals reviewed.  Constitutional: She appears well-developed and well-nourished. She is not diaphoretic. She is Obese . No distress.   HENT:   Head: Normocephalic and atraumatic.   Neck: Neck supple.   Normal range of motion.  Cardiovascular:  Normal rate, regular rhythm and normal heart sounds.     Exam reveals no gallop and no friction rub.       No murmur heard.  Pulmonary/Chest: Breath sounds normal. She has no wheezes. She has no rhonchi. She has no rales.   Abdominal: Abdomen is soft. Bowel sounds are normal. There is no abdominal tenderness. There is no rebound and no guarding.   Musculoskeletal:         General: Normal range of motion.      Cervical back: Normal range of motion and neck supple.     Neurological: She is alert and oriented to person, place, and time.   Skin: Skin is warm and dry. No rash noted. No erythema.   Psychiatric: Her speech is normal and behavior is normal. Judgment normal. Thought content is not paranoid. She exhibits a depressed mood. She expresses no homicidal and no suicidal ideation.   tearful       ED Course   Procedures  Labs Reviewed   CBC W/ AUTO DIFFERENTIAL - Abnormal; Notable for the following components:       Result Value    Hematocrit 35.8 (*)     All other components within normal limits   COMPREHENSIVE METABOLIC PANEL - Abnormal; Notable for the following components:    CO2 20 (*)     Albumin 3.1 (*)      (*)      (*)     All other components within normal limits    URINALYSIS, REFLEX TO URINE CULTURE - Abnormal; Notable for the following components:    Appearance, UA Cloudy (*)     Protein, UA 1+ (*)     Ketones, UA 3+ (*)     All other components within normal limits    Narrative:     Specimen Source->Urine   URINALYSIS MICROSCOPIC - Abnormal; Notable for the following components:    Bacteria Many (*)     All other components within normal limits    Narrative:     Specimen Source->Urine          Imaging Results    None          Medications - No data to display  Medical Decision Making:   History:   Old Medical Records: I decided to obtain old medical records.  Old Records Summarized: records from clinic visits and records from previous admission(s).       <> Summary of Records: Seen by OBGYN Dr Tsai. Ultrasound on 22 - REGINA 4/15/23  Clinical Tests:   Lab Tests: Ordered and Reviewed     APC / Resident Notes:   23 y/o  currently 17w6d pregnant presents to the ED c/o elevated BP.  Hypertensive, tachycardic at triage. Tachycardia improved on my examination. Alert, oriented. Abdomen soft, nontender. Denies VB/VD. She is intermittently tearful but denies SI/HI and states she is safe at home. DDx includes but is not limited to dehydration, ERIC, UTI. Will get labs, give IVF.    Bedside ultrasound performed by Dr Chou with good fetal movement, FHTs 160 bpm.     She refused IV and IVF. After labs drawn and sent, she is requesting to leave. BP and HR improved. She understands that she may have to return if labs are abnormal. Unable to r/o dehydration, ERIC, UTI at this time without lab results. She signed out AMA. She was instructed to follow up with her OBGYN - has an appt next week but may need BP check sooner.    After she left, CMP shows mildly elevated AST and ALT. Cr normal. UA with no leukocytosis. 83 squamous cells.                    Clinical Impression:   Final diagnoses:  [Z34.90] Pregnancy, unspecified gestational age (Primary)  [R03.0] Elevated blood pressure  reading        ED Disposition Condition    AMA Rachael Early PA-C  11/11/22 4155

## 2022-11-11 NOTE — ED NOTES
"Pt presents to ED in personal vehicle w/ c/o hypertension. Pt states she was in detention Tuesday and they checked her BP and her SBP was >200. Denies taking pressure at home since. Pt reports crying intermittently all week and states she has been having a panic attack since this morning and wants to check on the status of her baby. Pt reports abdominal pain; rated 8/10; described as "tightness." Pt states she is 17 weeks and 6 days pregnant. Endorses nausea. Pt experiencing significant stressors in her life.     Pt's first and last name and birthday confirmed.   LOC: The patient is awake, alert and aware of environment with an appropriate affect, the patient is oriented x 3 and speaking appropriately.  APPEARANCE: Patient resting comfortably; pt tearful.  SKIN: The skin is warm and dry, patient has normal skin turgor and moist mucus membranes, skin intact, no breakdown or brusing noted.  MUSKULOSKELETAL: Patient moving all extremities well, no obvious swelling or deformities noted.  RESPIRATORY: Airway is open and patent, respirations are spontaneous, patient has a normal effort and rate.   NEURO: No neuro deficits, no facial droop noted. Speech is clear.   "

## 2022-11-11 NOTE — TELEPHONE ENCOUNTER
"Pt called stated she is 17 weeks 4 days. Pt stated she is "going through stuff." Having panic attacks. Bp was over 200 on Tuesday bc her child father put her in longterm. Tightness in stomach. Pain is 8/10. Pt is crying hysterically. Triage nurse juan donnelly to get pt a little calmer. Care advice recommend pt go to Er. Pt verbalized understanding.   Reason for Disposition   MODERATE-SEVERE abdominal pain    Additional Information   Negative: Passed out (i.e., fainted, collapsed and was not responding)   Negative: Shock suspected (e.g., cold/pale/clammy skin, too weak to stand, low BP, rapid pulse)   Negative: Difficult to awaken or acting confused (e.g., disoriented, slurred speech)   Negative: Sounds like a life-threatening emergency to the triager    Protocols used: Pregnancy - Abdominal Pain Less Than 20 Weeks EGA-A-OH    "

## 2022-11-11 NOTE — ED NOTES
Pt stating she does not want an IV + does not want fluids through an IV. Pitcher of water provided to patient. MD made aware.

## 2022-11-14 ENCOUNTER — SOCIAL WORK (OUTPATIENT)
Dept: CASE MANAGEMENT | Facility: OTHER | Age: 22
End: 2022-11-14
Payer: MEDICAID

## 2022-11-14 NOTE — TELEPHONE ENCOUNTER
"Pt has been "going through stuff" is it possible you can reach out to her and see if we can be of any additional assistance. Pt was last seen for confirmation visit but has been no showing and cancelling multiple appts after not sure if its due to her social issues or not. Pt has an appt booked with zena in about a week with an ultrasound.  Please advise.  Happy Monday,    Tania:)    "
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
## 2022-11-18 ENCOUNTER — PATIENT MESSAGE (OUTPATIENT)
Dept: MATERNAL FETAL MEDICINE | Facility: CLINIC | Age: 22
End: 2022-11-18
Payer: MEDICAID

## 2022-11-23 ENCOUNTER — PATIENT MESSAGE (OUTPATIENT)
Dept: MATERNAL FETAL MEDICINE | Facility: CLINIC | Age: 22
End: 2022-11-23
Payer: MEDICAID

## 2022-11-23 ENCOUNTER — ROUTINE PRENATAL (OUTPATIENT)
Dept: OBSTETRICS AND GYNECOLOGY | Facility: CLINIC | Age: 22
End: 2022-11-23
Payer: MEDICAID

## 2022-11-23 VITALS — SYSTOLIC BLOOD PRESSURE: 110 MMHG | BODY MASS INDEX: 63.1 KG/M2 | DIASTOLIC BLOOD PRESSURE: 80 MMHG | WEIGHT: 293 LBS

## 2022-11-23 DIAGNOSIS — O09.32 INITIAL OBSTETRIC VISIT, SECOND TRIMESTER: Primary | ICD-10-CM

## 2022-11-23 DIAGNOSIS — I10 CHRONIC HYPERTENSION: ICD-10-CM

## 2022-11-23 DIAGNOSIS — Z3A.19 19 WEEKS GESTATION OF PREGNANCY: ICD-10-CM

## 2022-11-23 DIAGNOSIS — E66.01 MORBID OBESITY WITH BMI OF 60.0-69.9, ADULT: ICD-10-CM

## 2022-11-23 DIAGNOSIS — O09.92 SUPERVISION OF HIGH-RISK PREGNANCY, SECOND TRIMESTER: ICD-10-CM

## 2022-11-23 LAB
CREAT UR-MCNC: 138 MG/DL (ref 15–325)
PROT UR-MCNC: 8 MG/DL (ref 0–15)
PROT/CREAT UR: 0.06 MG/G{CREAT} (ref 0–0.2)

## 2022-11-23 PROCEDURE — 99214 OFFICE O/P EST MOD 30 MIN: CPT | Mod: TH,S$PBB,, | Performed by: OBSTETRICS & GYNECOLOGY

## 2022-11-23 PROCEDURE — 99999 PR PBB SHADOW E&M-EST. PATIENT-LVL III: CPT | Mod: PBBFAC,,, | Performed by: OBSTETRICS & GYNECOLOGY

## 2022-11-23 PROCEDURE — 99213 OFFICE O/P EST LOW 20 MIN: CPT | Mod: PBBFAC,TH | Performed by: OBSTETRICS & GYNECOLOGY

## 2022-11-23 PROCEDURE — 99999 PR PBB SHADOW E&M-EST. PATIENT-LVL III: ICD-10-PCS | Mod: PBBFAC,,, | Performed by: OBSTETRICS & GYNECOLOGY

## 2022-11-23 PROCEDURE — 82570 ASSAY OF URINE CREATININE: CPT | Performed by: OBSTETRICS & GYNECOLOGY

## 2022-11-23 PROCEDURE — 99214 PR OFFICE/OUTPT VISIT, EST, LEVL IV, 30-39 MIN: ICD-10-PCS | Mod: TH,S$PBB,, | Performed by: OBSTETRICS & GYNECOLOGY

## 2022-11-23 RX ORDER — ASPIRIN 81 MG/1
81 TABLET ORAL DAILY
Refills: 0 | COMMUNITY
Start: 2022-11-23 | End: 2022-11-29 | Stop reason: SDUPTHER

## 2022-11-23 NOTE — PROGRESS NOTES
@19w4d: Doing well, denies vaginal bleeding or cramping. +FM.  Has not been seen since her pregnancy confirmation visit. Discussed importance of keeping all prenatal appointments to try to ensure best outcomes for her baby and decrease risks. SW previously reached out to her, but the patient expressing stress today concerning her housing situation and that she and her partner may be evicted despite having paid rent for this upcoming month  History of CHTN - will obtain baseline labs and EKG. Discussed increased risk of preeclampsia. Will send referral to [facility] for assistance in the management of this medically complex patient.   Morbid obesity - BMI 63. Patient has lost 14 lbs since 12 week visit. Will do early 1-hr GTT. Counseled on not exceeding 11-20 lbs of weight gain in this pregnancy.   Patient reports nausea and decreased appetite. Unable to take PNV. Rx for Zofran sent to pharmacy. Patient encouraged to start ASA 81 mg for PreE prevention.   Labs to be done on Friday when she comes for anatomy US.  Care extremely limited by social determinants of health.  +FHR and movement seen on V-scan today.   RTC 2 weeks OB f/u. SAB precautions reviewed.

## 2022-11-25 ENCOUNTER — PROCEDURE VISIT (OUTPATIENT)
Dept: MATERNAL FETAL MEDICINE | Facility: CLINIC | Age: 22
End: 2022-11-25
Payer: MEDICAID

## 2022-11-25 ENCOUNTER — LAB VISIT (OUTPATIENT)
Dept: LAB | Facility: OTHER | Age: 22
End: 2022-11-25
Attending: OBSTETRICS & GYNECOLOGY
Payer: MEDICAID

## 2022-11-25 DIAGNOSIS — Z11.3 SCREEN FOR STD (SEXUALLY TRANSMITTED DISEASE): ICD-10-CM

## 2022-11-25 DIAGNOSIS — Z3A.19 19 WEEKS GESTATION OF PREGNANCY: ICD-10-CM

## 2022-11-25 DIAGNOSIS — O99.210 MATERNAL MORBID OBESITY, ANTEPARTUM: Primary | ICD-10-CM

## 2022-11-25 DIAGNOSIS — Z36.89 ENCOUNTER FOR FETAL ANATOMIC SURVEY: ICD-10-CM

## 2022-11-25 DIAGNOSIS — Z36.4 ULTRASOUND FOR ANTENATAL SCREENING FOR FETAL GROWTH RESTRICTION: ICD-10-CM

## 2022-11-25 DIAGNOSIS — Z36.3 ANTENATAL SCREENING FOR MALFORMATION USING ULTRASONICS: ICD-10-CM

## 2022-11-25 DIAGNOSIS — E66.01 MATERNAL MORBID OBESITY, ANTEPARTUM: Primary | ICD-10-CM

## 2022-11-25 DIAGNOSIS — O09.32 INITIAL OBSTETRIC VISIT, SECOND TRIMESTER: ICD-10-CM

## 2022-11-25 LAB
HBV SURFACE AG SERPL QL IA: NORMAL
HCV AB SERPL QL IA: NORMAL
HIV 1+2 AB+HIV1 P24 AG SERPL QL IA: NORMAL

## 2022-11-25 PROCEDURE — 76811 PR US, OB FETAL EVAL & EXAM, TRANSABDOM,FIRST GESTATION: ICD-10-PCS | Mod: 26,S$PBB,, | Performed by: OBSTETRICS & GYNECOLOGY

## 2022-11-25 PROCEDURE — 76811 OB US DETAILED SNGL FETUS: CPT | Mod: PBBFAC | Performed by: OBSTETRICS & GYNECOLOGY

## 2022-11-25 PROCEDURE — 87340 HEPATITIS B SURFACE AG IA: CPT | Performed by: OBSTETRICS & GYNECOLOGY

## 2022-11-25 PROCEDURE — 81511 FTL CGEN ABNOR FOUR ANAL: CPT | Performed by: OBSTETRICS & GYNECOLOGY

## 2022-11-25 PROCEDURE — 36415 COLL VENOUS BLD VENIPUNCTURE: CPT | Performed by: OBSTETRICS & GYNECOLOGY

## 2022-11-25 PROCEDURE — 86803 HEPATITIS C AB TEST: CPT | Performed by: OBSTETRICS & GYNECOLOGY

## 2022-11-25 PROCEDURE — 86592 SYPHILIS TEST NON-TREP QUAL: CPT | Performed by: OBSTETRICS & GYNECOLOGY

## 2022-11-25 PROCEDURE — 76811 OB US DETAILED SNGL FETUS: CPT | Mod: 26,S$PBB,, | Performed by: OBSTETRICS & GYNECOLOGY

## 2022-11-25 PROCEDURE — 87389 HIV-1 AG W/HIV-1&-2 AB AG IA: CPT | Performed by: OBSTETRICS & GYNECOLOGY

## 2022-11-28 ENCOUNTER — SOCIAL WORK (OUTPATIENT)
Dept: CASE MANAGEMENT | Facility: OTHER | Age: 22
End: 2022-11-28
Payer: MEDICAID

## 2022-11-28 ENCOUNTER — PATIENT MESSAGE (OUTPATIENT)
Dept: MATERNAL FETAL MEDICINE | Facility: CLINIC | Age: 22
End: 2022-11-28
Payer: MEDICAID

## 2022-11-28 DIAGNOSIS — Z36.2 ENCOUNTER FOR FOLLOW-UP ULTRASOUND OF FETAL ANATOMY: Primary | ICD-10-CM

## 2022-11-28 LAB — RPR SER QL: NORMAL

## 2022-11-28 NOTE — PROGRESS NOTES
SW contacted pt after referral received from OB clinic. Pt did not answer the phone and SW left a message to have pt contact SW at her earliest. SW can be reached at [phone].

## 2022-11-29 DIAGNOSIS — E66.01 MORBID OBESITY WITH BMI OF 60.0-69.9, ADULT: ICD-10-CM

## 2022-11-29 DIAGNOSIS — I10 CHRONIC HYPERTENSION: ICD-10-CM

## 2022-11-29 LAB
# FETUSES US: NORMAL
2ND TRIMESTER 4 SCREEN PNL SERPL: NEGATIVE
2ND TRIMESTER 4 SCREEN SERPL-IMP: NORMAL
AFP MOM SERPL: 1.33
AFP SERPL-MCNC: 64 NG/ML
AGE AT DELIVERY: 20
B-HCG MOM SERPL: 0.84
B-HCG SERPL-ACNC: 13.4 IU/ML
FET TS 21 RISK FROM MAT AGE: NORMAL
GA (DAYS): 0 D
GA (WEEKS): 21 WK
GA METHOD: NORMAL
IDDM PATIENT QL: NORMAL
INHIBIN A MOM SERPL: 0.91
INHIBIN A SERPL-MCNC: 103 PG/ML
SMOKING STATUS FTND: NO
TS 18 RISK FETUS: NORMAL
TS 21 RISK FETUS: NORMAL
U ESTRIOL MOM SERPL: 1.8
U ESTRIOL SERPL-MCNC: 3.43 NG/ML

## 2022-11-29 RX ORDER — ASPIRIN 81 MG/1
81 TABLET ORAL DAILY
Qty: 90 TABLET | Refills: 6 | COMMUNITY
Start: 2022-11-29 | End: 2023-03-10

## 2022-11-30 ENCOUNTER — PATIENT MESSAGE (OUTPATIENT)
Dept: MATERNAL FETAL MEDICINE | Facility: CLINIC | Age: 22
End: 2022-11-30
Payer: MEDICAID

## 2022-12-06 ENCOUNTER — HOSPITAL ENCOUNTER (EMERGENCY)
Facility: OTHER | Age: 22
Discharge: HOME | End: 2022-12-06
Attending: OBSTETRICS & GYNECOLOGY
Payer: MEDICAID

## 2022-12-06 VITALS
SYSTOLIC BLOOD PRESSURE: 119 MMHG | TEMPERATURE: 98 F | RESPIRATION RATE: 20 BRPM | DIASTOLIC BLOOD PRESSURE: 71 MMHG | HEART RATE: 102 BPM | OXYGEN SATURATION: 100 %

## 2022-12-06 DIAGNOSIS — R51.9 NONINTRACTABLE HEADACHE, UNSPECIFIED CHRONICITY PATTERN, UNSPECIFIED HEADACHE TYPE: Primary | ICD-10-CM

## 2022-12-06 DIAGNOSIS — R42 LIGHT-HEADEDNESS: ICD-10-CM

## 2022-12-06 DIAGNOSIS — Z3A.21 21 WEEKS GESTATION OF PREGNANCY: ICD-10-CM

## 2022-12-06 LAB
BILIRUB SERPL-MCNC: NORMAL MG/DL
BLOOD URINE, POC: NORMAL
COLOR, POC UA: NORMAL
GLUCOSE UR QL STRIP: NORMAL
KETONES UR QL STRIP: 2
LEUKOCYTE ESTERASE URINE, POC: NORMAL
NITRITE, POC UA: NORMAL
PH, POC UA: NORMAL
PROTEIN, POC: NORMAL
SPECIFIC GRAVITY, POC UA: NORMAL
UROBILINOGEN, POC UA: NORMAL

## 2022-12-06 PROCEDURE — 99284 EMERGENCY DEPT VISIT MOD MDM: CPT | Mod: ,,, | Performed by: OBSTETRICS & GYNECOLOGY

## 2022-12-06 PROCEDURE — 99283 EMERGENCY DEPT VISIT LOW MDM: CPT

## 2022-12-06 PROCEDURE — 99284 PR EMERGENCY DEPT VISIT,LEVEL IV: ICD-10-PCS | Mod: ,,, | Performed by: OBSTETRICS & GYNECOLOGY

## 2022-12-06 PROCEDURE — 81002 URINALYSIS NONAUTO W/O SCOPE: CPT

## 2022-12-06 PROCEDURE — 25000003 PHARM REV CODE 250

## 2022-12-06 RX ORDER — ACETAMINOPHEN 500 MG
1000 TABLET ORAL ONCE
Status: COMPLETED | OUTPATIENT
Start: 2022-12-06 | End: 2022-12-06

## 2022-12-06 RX ADMIN — ACETAMINOPHEN 1000 MG: 500 TABLET, COATED ORAL at 09:12

## 2022-12-07 ENCOUNTER — ROUTINE PRENATAL (OUTPATIENT)
Dept: OBSTETRICS AND GYNECOLOGY | Facility: CLINIC | Age: 22
End: 2022-12-07
Payer: MEDICAID

## 2022-12-07 ENCOUNTER — PATIENT MESSAGE (OUTPATIENT)
Dept: ADMINISTRATIVE | Facility: OTHER | Age: 22
End: 2022-12-07
Payer: MEDICAID

## 2022-12-07 ENCOUNTER — TELEPHONE (OUTPATIENT)
Dept: OBSTETRICS AND GYNECOLOGY | Facility: CLINIC | Age: 22
End: 2022-12-07
Payer: MEDICAID

## 2022-12-07 VITALS — DIASTOLIC BLOOD PRESSURE: 70 MMHG | BODY MASS INDEX: 62.26 KG/M2 | WEIGHT: 293 LBS | SYSTOLIC BLOOD PRESSURE: 110 MMHG

## 2022-12-07 DIAGNOSIS — E66.01 MORBID OBESITY WITH BMI OF 60.0-69.9, ADULT: ICD-10-CM

## 2022-12-07 DIAGNOSIS — Z3A.21 21 WEEKS GESTATION OF PREGNANCY: ICD-10-CM

## 2022-12-07 DIAGNOSIS — I10 CHRONIC HYPERTENSION: ICD-10-CM

## 2022-12-07 DIAGNOSIS — O09.92 SUPERVISION OF HIGH-RISK PREGNANCY, SECOND TRIMESTER: Primary | ICD-10-CM

## 2022-12-07 PROCEDURE — 99213 OFFICE O/P EST LOW 20 MIN: CPT | Mod: PBBFAC,TH | Performed by: OBSTETRICS & GYNECOLOGY

## 2022-12-07 PROCEDURE — 99999 PR PBB SHADOW E&M-EST. PATIENT-LVL III: ICD-10-PCS | Mod: PBBFAC,,, | Performed by: OBSTETRICS & GYNECOLOGY

## 2022-12-07 PROCEDURE — 99214 PR OFFICE/OUTPT VISIT, EST, LEVL IV, 30-39 MIN: ICD-10-PCS | Mod: TH,S$PBB,, | Performed by: OBSTETRICS & GYNECOLOGY

## 2022-12-07 PROCEDURE — 99999 PR PBB SHADOW E&M-EST. PATIENT-LVL III: CPT | Mod: PBBFAC,,, | Performed by: OBSTETRICS & GYNECOLOGY

## 2022-12-07 PROCEDURE — 99214 OFFICE O/P EST MOD 30 MIN: CPT | Mod: TH,S$PBB,, | Performed by: OBSTETRICS & GYNECOLOGY

## 2022-12-07 NOTE — ED PROVIDER NOTES
Encounter Date: 2022       History     Chief Complaint   Patient presents with    Headache    Shortness of Breath     Kaylie Dangelo is a 22 y.o. [id number] at 21w3d presents complaining of headache and blurry vision for 3 days. Patient reports a frontal throbbing headache. Has not tried taking tylenol at home. She also reports blurry vision and SOB on exertion. She states she was at home and started feeling light-headed with blurry vision so she called EMS. She denies any syncopal episodes. She is not experiencing SOB or blurry vision at this time. Patient reports not eating or drinking enough water recently. Reports her last meal was one night ago. She drinks 1-2 water bottles per day.   This IUP is complicated by bipolar disorder, depression, sleep apnea, morbid obesity, and cHTN. .  Patient denies contractions, denies vaginal bleeding, denies LOF.   Fetal Movement: normal      Review of patient's allergies indicates:   Allergen Reactions    Citrus and derivatives     Penicillins     Lamotrigine Rash     Reported by mother on 18     Past Medical History:   Diagnosis Date    ADHD, predominantly hyperactive-impulsive subtype     Asthma     Bipolar 1 disorder     Depression     Hypertension     Insomnia      No past surgical history on file.  Family History   Problem Relation Age of Onset    Diabetes Mother     Heart failure Mother     Breast cancer Neg Hx     Colon cancer Neg Hx     Ovarian cancer Neg Hx      Social History     Tobacco Use    Smoking status: Former    Smokeless tobacco: Never   Substance Use Topics    Alcohol use: No    Drug use: Not Currently     Types: Marijuana     Review of Systems   Constitutional:  Negative for chills and fever.   HENT:  Negative for congestion and sore throat.    Eyes:  Positive for visual disturbance (intermittent blurry vision).   Respiratory:  Positive for shortness of breath. Negative for cough.    Cardiovascular:  Negative for chest pain, palpitations and leg  swelling.   Gastrointestinal:  Negative for abdominal pain, constipation, diarrhea, nausea and vomiting.   Genitourinary:  Negative for dysuria, frequency, vaginal bleeding and vaginal discharge.   Musculoskeletal:  Negative for back pain.   Skin:  Negative for rash.   Neurological:  Positive for light-headedness and headaches. Negative for syncope.   Hematological:  Does not bruise/bleed easily.   Psychiatric/Behavioral:  The patient is not nervous/anxious.      Physical Exam     Initial Vitals [12/06/22 2110]   BP Pulse Resp Temp SpO2   (!) 125/58 95 20 98.2 °F (36.8 °C) 96 %      MAP       --         Physical Exam    Vitals reviewed.  Constitutional: She appears well-developed and well-nourished.   Morbidly obese   HENT:   Head: Normocephalic and atraumatic.   Eyes: Conjunctivae are normal.   Neck:   Normal range of motion.  Cardiovascular:  Normal rate.           Pulmonary/Chest: No respiratory distress.   Abdominal: Abdomen is soft. She exhibits no distension. There is no abdominal tenderness.   Genitourinary:    Genitourinary Comments: deferred     Musculoskeletal:         General: Normal range of motion.      Cervical back: Normal range of motion.     Neurological: She is alert and oriented to person, place, and time.   Skin: Skin is warm and dry.   Psychiatric: She has a normal mood and affect.       ED Course   Procedures  Labs Reviewed   POCT URINALYSIS, DIPSTICK OR TABLET REAGENT, AUTOMATED, WITH MICROSCOP          Imaging Results    None          Medications   acetaminophen tablet 1,000 mg (1,000 mg Oral Given 12/6/22 2145)     Medical Decision Making:   ED Management:  -VSS  -FHT on BSUS 154bpm  -PO hydration and tylenol given with improvement of headache  -Counseled patient on importance of adequate hydration and caloric intake in pregnancy  -Return precautions given   -Patient has appt tomorrow with primary OB and with MFM on 12/12  -Stable for discharge           Attending Attestation:   Physician  Attestation Statement for Resident:  As the supervising MD   Physician Attestation Statement: I have personally seen and examined this patient.   I agree with the above history.  -:   As the supervising MD I agree with the above PE.     As the supervising MD I agree with the above treatment, course, plan, and disposition.   I was personally present during the critical portions of the procedure(s) performed by the resident and was immediately available in the ED to provide services and assistance as needed during the entire procedure.              Attending ED Notes:   I have personally seen and examined the patient. I agree with the resident's note. Questions welcomed and answered to patient satisfaction.      @ 21w3d  presenting for headache  Resolved with tylenol   Bps normotensive   +FHT    Agree with discharge to home, and given the following instructions:Monitor blood pressures and symptoms at home. Return immediately to [facility] if blood pressure greater than 160/110 on two consecutive readings or if experiencing the following symptoms: headache not relieved with tylenol, dizziness, visual changes, chest pain, shortness of breath, nausea/ vomiting, abdominal pain or vaginal bleeding.     Return to clinic as directed for appt on , to obtain BP cuff and tylenol for at home use.     Sumi Park MD  2022 6:28 AM                   Clinical Impression:   Final diagnoses:  [R51.9] Nonintractable headache, unspecified chronicity pattern, unspecified headache type (Primary)  [Z3A.21] 21 weeks gestation of pregnancy  [R42] Light-headedness      ED Disposition Condition    Discharge Good          ED Prescriptions    None       Follow-up Information    None       Mary Beth Leroy MD  [facility] OBGYN PGY1       Mary Beth Leroy MD  Resident  22 [id number]       Sumi Park MD  22 2175

## 2022-12-08 NOTE — PROGRESS NOTES
@21w4d: Doing well, denies CTX, LOF, VB. +FM.   CHTN - documented in patient's chart, however patient denies having a history of HTN and not on any medications.   Obesity - Patient encouraged to do early 1-hr GTT which she has not yet done. Will reschedule today.  Has not yet done EKG. Will reschedule.  Reiterated starting ASA 81 mg and continuing prenatal vitamin daily.   Anatomy scan wnl but some suboptimal. Repeat US scheduled.  MFM consult on 12/12. Appreciate their assistance in the management of this patient.   RTC 4 weeks OB f/u. SAB precautions reviewed.   Care significantly limited by social determinants of health. Cris Bains, CORBYW, actively involved in helping to coordinate the patient's care. Patient relies on Medicaid transportation to make it to most appointments.

## 2022-12-12 ENCOUNTER — OFFICE VISIT (OUTPATIENT)
Dept: MATERNAL FETAL MEDICINE | Facility: CLINIC | Age: 22
End: 2022-12-12
Payer: MEDICAID

## 2022-12-12 DIAGNOSIS — G47.33 OBSTRUCTIVE SLEEP APNEA SYNDROME: ICD-10-CM

## 2022-12-12 DIAGNOSIS — E66.01 MORBID OBESITY WITH BMI OF 60.0-69.9, ADULT: ICD-10-CM

## 2022-12-12 DIAGNOSIS — O09.32 INITIAL OBSTETRIC VISIT, SECOND TRIMESTER: ICD-10-CM

## 2022-12-12 DIAGNOSIS — F31.9 BIPOLAR 1 DISORDER: ICD-10-CM

## 2022-12-12 DIAGNOSIS — I10 CHRONIC HYPERTENSION: ICD-10-CM

## 2022-12-12 PROCEDURE — 99214 PR OFFICE/OUTPT VISIT, EST, LEVL IV, 30-39 MIN: ICD-10-PCS | Mod: TH,95,, | Performed by: OBSTETRICS & GYNECOLOGY

## 2022-12-12 PROCEDURE — 99214 OFFICE O/P EST MOD 30 MIN: CPT | Mod: TH,95,, | Performed by: OBSTETRICS & GYNECOLOGY

## 2022-12-12 NOTE — ASSESSMENT & PLAN NOTE
Obesity   risks associated with obesity include an increased risk of hypertension, preeclampsia, gestational diabetes, venous thromboembolic disease,  delivery, macrosomia (with resultant shoulder dystocia, obstetric sphincter injury), IUFD, longer first stage of labor, decreased TOLAC success rate, emergent & scheduled  & complications of  (prolonged OR time, delayed delivery, excessive EBL, infection, wound separation/infection, higher spinal failure rate, more difficult intubation).  Obesity is also associated with fetal anomalies, specifically neural tube defects.  Studies have shown that the rate of complication increases with rising BMI and thus pregnancies with maternal morbid obesity are at increased risk.      Recommendations:   Discussed that TWG goal is 11-20 lbs; patient has been losing weight. Counseled on risk for PTD/FGR.   Screen for signs/symptoms of obstructive sleep apnea-see above   Nutritionist consult should be considered (this is to be ordered by primary OB provider)   Consider early 1 hr GCT (between 14-16 weeks gestation); repeat at 24-28 weeks gestation   Continue low dose aspirin 81 mg daily at 12-16 weeks for preeclampsia risk reduction   Targeted anatomical survey performed, f/u anatomy scheduled   Serial fetal growth US per CHTN   Twice weekly antepartum testing at 32 weeks (see CHTN)   Lovenox 40 mg BID for VTE prophylaxis while admitted to the hospital (antepartum or postpartum) if BMI >= 40.    Encourage breastfeeding   Postpartum lifestyle modifications & weight loss

## 2022-12-12 NOTE — ASSESSMENT & PLAN NOTE
Patient denies any mental health concerns. Is not on any medications. Will defer to primary OB. Recommend consultation with psychiatry as indicated.

## 2022-12-12 NOTE — ASSESSMENT & PLAN NOTE
Patient with CORI, but no CPAP use. Patient states was supposed to have machine sent to house but this was not done. Advised patient to follow-up with sleep medicine and counseled regarding health risks with untreated CORI.    Recommendations:   Primary OB should refer patient back to sleep medicine for CORI evaluation and titration of CPAP machine as indicated.

## 2022-12-12 NOTE — PROGRESS NOTES
The patient location is: Home   The chief complaint leading to consultation is: CHTN, obesity    Visit type: audiovisual    Face to Face time with patient: 20  30-39 minutes of total time spent on the encounter, which includes face to face time and non-face to face time preparing to see the patient (eg, review of tests), Obtaining and/or reviewing separately obtained history, Documenting clinical information in the electronic or other health record, Independently interpreting results (not separately reported) and communicating results to the patient/family/caregiver, or Care coordination (not separately reported).         Each patient to whom he or she provides medical services by telemedicine is:  (1) informed of the relationship between the physician and patient and the respective role of any other health care provider with respect to management of the patient; and (2) notified that he or she may decline to receive medical services by telemedicine and may withdraw from such care at any time.    Notes:       MATERNAL-FETAL MEDICINE   CONSULT NOTE    Provider requesting consultation: Dr. Tsai    SUBJECTIVE:     Ms. Kaylie Dangelo is a 22 y.o.  female with IUP at 22w2d who is seen in consultation by MFM for evaluation and management of:  Problem   Morbid Obesity With Bmi of 60.0-69.9, Adult   Chronic Hypertension   Obstructive Sleep Apnea Syndrome   Bipolar 1 Disorder          Meds: low dose aspirin, not on PNV-needs to go to pharmacy to get one she can tolerate      Review of patient's allergies indicates:   Allergen Reactions    Citrus and derivatives     Penicillins     Lamotrigine Rash     Reported by mother on 18       PMH:  Past Medical History:   Diagnosis Date    ADHD, predominantly hyperactive-impulsive subtype     Asthma     Bipolar 1 disorder     Depression     Hypertension     Insomnia        PObHx:  OB History    Para Term  AB Living   1 0 0 0 0 0   SAB IAB Ectopic Multiple  Live Births   0 0 0 0 0      # Outcome Date GA Lbr Asif/2nd Weight Sex Delivery Anes PTL Lv   1 Current                PSH:No past surgical history on file.    Family history:family history includes Diabetes in her mother; Heart failure in her mother.    Social history: Denies T/E/D.    Genetic history: Patient's brother with congenital heart defect. Mom with sickle cell trait.   Objective:   LMP 2022 (Exact Date)  virtual visit    Significant labs/imaging:  Review[facility] in [facility]    ASSESSMENT/PLAN:     22 y.o.  female with IUP at 22w2d     Chronic hypertension  Patient able to provide limited history. Per patient, was on medications (unknown) when she was 15 or 16 but has not been on any this pregnancy. Denies end-organ damage from CHTN; however, noted elevated LFTs on recent CMP (2022). Has not undergone full work-up. BP in prenatal visits have been normal. One Connected Mom reading elevated, but this was right after vomiting per patient. One ED visit-mild range.     Chronic Hypertension  Today I counseled the patient on maternal/fetal risks associated with CHTN during pregnancy. Risks include but not limited to fetal growth restriction, miscarriage, abruption, maternal end organ disease (renal failure, MI, and stroke),  delivery, development of superimposed preeclampsia, and eclampsia. Please see below for recommendations for blood pressure control, serial ultrasound for fetal growth assessment and  testing, and timing of delivery. I also counseled her on the recommendation for aspirin 81 mg daily which may decrease her risk of developing superimposed preeclampsia, which she is already taking.     Recommendations (Please refer to [facility] guidelines):  -Continue aspirin 81 mg daily at 12-16 weeks gestation for preeclampsia risk reduction  -Continue current medications: (none)-per Epic has previously been on Clonidine and HCTZ  -Baseline evaluation with primary OB:   24-hour urine  protein or baseline P/C ratio, CMP, and CBC.  LFTs notable for elevated liver enzymes; recommend primary OB repeat and if remain elevated, please refer to hepatology for further evaluation  Maternal EKG  Maternal ophthalmic evaluation  Please order and schedule patient for maternal echocardiogram given co-morbidities (BMI > 60)  -Serial fetal growth ultrasounds every 4-6 weeks, beginning at 26-28 weeks.   -Continued close observation of patient's blood pressures. Avoid hypotension as this has been associated with uteroplacental insufficiency.  -Recommend treatment to a goal blood pressure < 140/90; treatment not indicated with current review of BP in prenatal record  -Twice weekly antepartum testing at 32 weeks (NST+AFV)  -Delivery timing:  No medications, no comorbid conditions: 39 0/7 - 39 6/7 weeks gestation  No medications, comorbid conditions: 38 0/7 - 38 6/7 weeks gestation  Controlled on single agent, no comorbid conditions: 38 0/7 - 38 6/7 weeks gestation  Controlled on single agent, comorbid conditions: 37 0/7 - 38 6/7 weeks gestation  Uncontrolled or requiring ? 2 medications: 36 0/7 - 37 6/7 weeks gestation    Comorbid conditions include BMI >= 40, diabetes, and complex medical condition associated with placental dysfunction (ie lupus or other vascular disease)  Delivery may be recommended earlier pending results of fetal growth ultrasounds, AFV assessment, or antepartum testing results.          Bipolar 1 disorder  Patient denies any mental health concerns. Is not on any medications. Will defer to primary OB. Recommend consultation with psychiatry as indicated.    Obstructive sleep apnea syndrome  Patient with CORI, but no CPAP use. Patient states was supposed to have machine sent to house but this was not done. Advised patient to follow-up with sleep medicine and counseled regarding health risks with untreated CORI.    Recommendations:  Primary OB should refer patient back to sleep medicine for CORI  evaluation and titration of CPAP machine as indicated.      Morbid obesity with BMI of 60.0-69.9, adult  Obesity   risks associated with obesity include an increased risk of hypertension, preeclampsia, gestational diabetes, venous thromboembolic disease,  delivery, macrosomia (with resultant shoulder dystocia, obstetric sphincter injury), IUFD, longer first stage of labor, decreased TOLAC success rate, emergent & scheduled  & complications of  (prolonged OR time, delayed delivery, excessive EBL, infection, wound separation/infection, higher spinal failure rate, more difficult intubation).  Obesity is also associated with fetal anomalies, specifically neural tube defects.  Studies have shown that the rate of complication increases with rising BMI and thus pregnancies with maternal morbid obesity are at increased risk.      Recommendations:  Discussed that TWG goal is 11-20 lbs; patient has been losing weight. Counseled on risk for PTD/FGR.  Screen for signs/symptoms of obstructive sleep apnea-see above  Nutritionist consult should be considered (this is to be ordered by primary OB provider)  Consider early 1 hr GCT (between 14-16 weeks gestation); repeat at 24-28 weeks gestation  Continue low dose aspirin 81 mg daily at 12-16 weeks for preeclampsia risk reduction  Targeted anatomical survey performed, f/u anatomy scheduled  Serial fetal growth US per CHTN  Twice weekly antepartum testing at 32 weeks (see CHTN)  Lovenox 40 mg BID for VTE prophylaxis while admitted to the hospital (antepartum or postpartum) if BMI >= 40.   Encourage breastfeeding  Postpartum lifestyle modifications & weight loss        FOLLOW UP:   No f/u consult visits scheduled; please re-consult or contact our division if co-management is desired.  F/u US scheduled for 2023.      30-39 minutes of total time spent on the encounter, which includes face to face time and non-face to face time preparing to see the  patient (eg, review of tests), obtaining and/or reviewing separately obtained history, documenting clinical information in the electronic or other health record, independently interpreting results (not separately reported) and communicating results to the patient/family/caregiver, or care coordination (not separately reported).      Lois Johnson  Maternal-Fetal Medicine    Electronically Signed by Lois Johnson December 12, 2022

## 2022-12-12 NOTE — ASSESSMENT & PLAN NOTE
Patient able to provide limited history. Per patient, was on medications (unknown) when she was 15 or 16 but has not been on any this pregnancy. Denies end-organ damage from CHTN; however, noted elevated LFTs on recent CMP (2022). Has not undergone full work-up. BP in prenatal visits have been normal. One Connected Mom reading elevated, but this was right after vomiting per patient. One ED visit-mild range.     Chronic Hypertension  Today I counseled the patient on maternal/fetal risks associated with CHTN during pregnancy. Risks include but not limited to fetal growth restriction, miscarriage, abruption, maternal end organ disease (renal failure, MI, and stroke),  delivery, development of superimposed preeclampsia, and eclampsia. Please see below for recommendations for blood pressure control, serial ultrasound for fetal growth assessment and  testing, and timing of delivery. I also counseled her on the recommendation for aspirin 81 mg daily which may decrease her risk of developing superimposed preeclampsia, which she is already taking.     Recommendations (Please refer to [facility] guidelines):  -Continue aspirin 81 mg daily at 12-16 weeks gestation for preeclampsia risk reduction  -Continue current medications: (none)-per Epic has previously been on Clonidine and HCTZ  -Baseline evaluation with primary OB:    24-hour urine protein or baseline P/C ratio, CMP, and CBC.   LFTs notable for elevated liver enzymes; recommend primary OB repeat and if remain elevated, please refer to hepatology for further evaluation   Maternal EKG   Maternal ophthalmic evaluation   Please order and schedule patient for maternal echocardiogram given co-morbidities (BMI > 60)  -Serial fetal growth ultrasounds every 4-6 weeks, beginning at 26-28 weeks.   -Continued close observation of patient's blood pressures. Avoid hypotension as this has been associated with uteroplacental insufficiency.  -Recommend treatment  to a goal blood pressure < 140/90; treatment not indicated with current review of BP in prenatal record  -Twice weekly antepartum testing at 32 weeks (NST+AFV)  -Delivery timing:  No medications, no comorbid conditions: 39 0/7 - 39 6/7 weeks gestation  No medications, comorbid conditions: 38 0/7 - 38 6/7 weeks gestation  Controlled on single agent, no comorbid conditions: 38 0/7 - 38 6/7 weeks gestation  Controlled on single agent, comorbid conditions: 37 0/7 - 38 6/7 weeks gestation  Uncontrolled or requiring ? 2 medications: 36 0/7 - 37 6/7 weeks gestation    Comorbid conditions include BMI >= 40, diabetes, and complex medical condition associated with placental dysfunction (ie lupus or other vascular disease)  Delivery may be recommended earlier pending results of fetal growth ultrasounds, AFV assessment, or antepartum testing results.

## 2023-01-05 ENCOUNTER — PROCEDURE VISIT (OUTPATIENT)
Dept: MATERNAL FETAL MEDICINE | Facility: CLINIC | Age: 23
End: 2023-01-05
Payer: MEDICAID

## 2023-01-05 ENCOUNTER — TELEPHONE (OUTPATIENT)
Dept: PEDIATRIC CARDIOLOGY | Facility: CLINIC | Age: 23
End: 2023-01-05
Payer: MEDICAID

## 2023-01-05 ENCOUNTER — HOSPITAL ENCOUNTER (OUTPATIENT)
Dept: CARDIOLOGY | Facility: OTHER | Age: 23
Discharge: HOME | End: 2023-01-05
Attending: OBSTETRICS & GYNECOLOGY
Payer: MEDICAID

## 2023-01-05 ENCOUNTER — PATIENT MESSAGE (OUTPATIENT)
Dept: PEDIATRIC CARDIOLOGY | Facility: CLINIC | Age: 23
End: 2023-01-05
Payer: MEDICAID

## 2023-01-05 DIAGNOSIS — Z36.2 ENCOUNTER FOR FOLLOW-UP ULTRASOUND OF FETAL ANATOMY: ICD-10-CM

## 2023-01-05 DIAGNOSIS — Z36.89 ENCOUNTER FOR ULTRASOUND TO ASSESS FETAL GROWTH: Primary | ICD-10-CM

## 2023-01-05 DIAGNOSIS — E66.01 MORBID OBESITY WITH BMI OF 60.0-69.9, ADULT: ICD-10-CM

## 2023-01-05 DIAGNOSIS — I10 CHRONIC HYPERTENSION: ICD-10-CM

## 2023-01-05 PROCEDURE — 93010 EKG 12-LEAD: ICD-10-PCS | Mod: ,,, | Performed by: INTERNAL MEDICINE

## 2023-01-05 PROCEDURE — 93010 ELECTROCARDIOGRAM REPORT: CPT | Mod: ,,, | Performed by: INTERNAL MEDICINE

## 2023-01-05 PROCEDURE — 76816 OB US FOLLOW-UP PER FETUS: CPT | Mod: PBBFAC | Performed by: OBSTETRICS & GYNECOLOGY

## 2023-01-05 PROCEDURE — 76816 US MFM PROCEDURE (VIEWPOINT): ICD-10-PCS | Mod: 26,S$PBB,, | Performed by: OBSTETRICS & GYNECOLOGY

## 2023-01-05 PROCEDURE — 93005 ELECTROCARDIOGRAM TRACING: CPT

## 2023-01-05 NOTE — TELEPHONE ENCOUNTER
Call placed to patient to schedule fetal echo. Call answered by voicemail. Message left requesting return call to [phone] to schedule fetal echo appointment.

## 2023-01-07 ENCOUNTER — PATIENT MESSAGE (OUTPATIENT)
Dept: OTHER | Facility: OTHER | Age: 23
End: 2023-01-07
Payer: MEDICAID

## 2023-01-13 ENCOUNTER — TELEPHONE (OUTPATIENT)
Dept: OBSTETRICS AND GYNECOLOGY | Facility: CLINIC | Age: 23
End: 2023-01-13
Payer: MEDICAID

## 2023-01-13 NOTE — TELEPHONE ENCOUNTER
----- Message from Ladonna Tsai MD sent at 1/12/2023 12:41 PM CST -----  Regarding: Schedule OB f/u  Can you please call Kaylie and get her scheduled with me the week of 1/30 (I'm out that Monday) for OB f/u? Thanks.

## 2023-01-17 ENCOUNTER — ROUTINE PRENATAL (OUTPATIENT)
Dept: OBSTETRICS AND GYNECOLOGY | Facility: CLINIC | Age: 23
End: 2023-01-17
Payer: MEDICAID

## 2023-01-17 VITALS — SYSTOLIC BLOOD PRESSURE: 125 MMHG | BODY MASS INDEX: 63.51 KG/M2 | DIASTOLIC BLOOD PRESSURE: 73 MMHG | WEIGHT: 293 LBS

## 2023-01-17 DIAGNOSIS — E66.01 MORBID OBESITY: ICD-10-CM

## 2023-01-17 DIAGNOSIS — Z3A.27 PREGNANCY WITH 27 COMPLETED WEEKS GESTATION: Primary | ICD-10-CM

## 2023-01-17 DIAGNOSIS — G47.30 SLEEP APNEA IN ADULT: ICD-10-CM

## 2023-01-17 DIAGNOSIS — R39.15 URINARY URGENCY: ICD-10-CM

## 2023-01-17 DIAGNOSIS — Z86.79 HISTORY OF CHRONIC HYPERTENSION: ICD-10-CM

## 2023-01-17 PROCEDURE — 87086 URINE CULTURE/COLONY COUNT: CPT | Performed by: REGISTERED NURSE

## 2023-01-17 PROCEDURE — 99999 PR PBB SHADOW E&M-EST. PATIENT-LVL III: CPT | Mod: PBBFAC,,, | Performed by: REGISTERED NURSE

## 2023-01-17 PROCEDURE — 99213 PR OFFICE/OUTPT VISIT, EST, LEVL III, 20-29 MIN: ICD-10-PCS | Mod: TH,S$PBB,, | Performed by: REGISTERED NURSE

## 2023-01-17 PROCEDURE — 99213 OFFICE O/P EST LOW 20 MIN: CPT | Mod: TH,S$PBB,, | Performed by: REGISTERED NURSE

## 2023-01-17 PROCEDURE — 99999 PR PBB SHADOW E&M-EST. PATIENT-LVL III: ICD-10-PCS | Mod: PBBFAC,,, | Performed by: REGISTERED NURSE

## 2023-01-17 PROCEDURE — 99213 OFFICE O/P EST LOW 20 MIN: CPT | Mod: PBBFAC | Performed by: REGISTERED NURSE

## 2023-01-17 NOTE — PROGRESS NOTES
Here for routine OB appt at 27w3d, with c/o fatigue- having difficulty sleeping at night. She reports infrequent [place] contractions, admits to dehydration. Discussed increasing water intake. Reports feeling need to urinate but just a few drops coming out- will send urine for culture.  Denies VB.  Pain, bleeding, and PTL precautions given. Discussed daily FM counts. Discussed continuance of ASA 81 mg. Scheduled for PNT twice weekly (MO and chtn) at 32 weeks. She is scheduled for 32 week growth US and fetal echo. Referral placed for Sleep per [facility] recs. [place].  F/U scheduled 4 weeks.

## 2023-01-18 LAB
BACTERIA UR CULT: NORMAL
BACTERIA UR CULT: NORMAL

## 2023-01-21 ENCOUNTER — PATIENT MESSAGE (OUTPATIENT)
Dept: OTHER | Facility: OTHER | Age: 23
End: 2023-01-21
Payer: MEDICAID

## 2023-02-03 ENCOUNTER — TELEPHONE (OUTPATIENT)
Dept: PEDIATRIC CARDIOLOGY | Facility: CLINIC | Age: 23
End: 2023-02-03
Payer: MEDICAID

## 2023-02-03 NOTE — TELEPHONE ENCOUNTER
Left a voicemail reminding patient that she has a fetal appointment on 2/6/2023 at 1:45 PM. Requested patient call back to [phone] to confirm if she will be coming.

## 2023-02-04 ENCOUNTER — PATIENT MESSAGE (OUTPATIENT)
Dept: OTHER | Facility: OTHER | Age: 23
End: 2023-02-04
Payer: MEDICAID

## 2023-02-06 ENCOUNTER — OFFICE VISIT (OUTPATIENT)
Dept: PEDIATRIC CARDIOLOGY | Facility: CLINIC | Age: 23
End: 2023-02-06
Attending: PEDIATRICS
Payer: MEDICAID

## 2023-02-06 ENCOUNTER — HOSPITAL ENCOUNTER (OUTPATIENT)
Dept: PERINATAL CARE | Facility: OTHER | Age: 23
Discharge: HOME | End: 2023-02-06
Attending: REGISTERED NURSE
Payer: MEDICAID

## 2023-02-06 ENCOUNTER — CLINICAL SUPPORT (OUTPATIENT)
Dept: PEDIATRIC CARDIOLOGY | Facility: CLINIC | Age: 23
End: 2023-02-06
Payer: MEDICAID

## 2023-02-06 VITALS
HEIGHT: 62 IN | BODY MASS INDEX: 53.92 KG/M2 | DIASTOLIC BLOOD PRESSURE: 70 MMHG | WEIGHT: 293 LBS | HEART RATE: 99 BPM | SYSTOLIC BLOOD PRESSURE: 134 MMHG

## 2023-02-06 DIAGNOSIS — Z36.83 ENCOUNTER FOR FETAL SCREENING FOR CONGENITAL CARDIAC ABNORMALITIES: ICD-10-CM

## 2023-02-06 DIAGNOSIS — E66.01 MORBID OBESITY: ICD-10-CM

## 2023-02-06 DIAGNOSIS — Z86.79 HISTORY OF CHRONIC HYPERTENSION: ICD-10-CM

## 2023-02-06 DIAGNOSIS — E66.01 MORBID OBESITY WITH BMI OF 60.0-69.9, ADULT: ICD-10-CM

## 2023-02-06 DIAGNOSIS — O99.213 OBESITY AFFECTING PREGNANCY IN THIRD TRIMESTER, ANTEPARTUM: Primary | ICD-10-CM

## 2023-02-06 DIAGNOSIS — Z3A.30 30 WEEKS GESTATION OF PREGNANCY: ICD-10-CM

## 2023-02-06 PROCEDURE — 59025 FETAL NON-STRESS TEST: CPT | Mod: 26,,, | Performed by: OBSTETRICS & GYNECOLOGY

## 2023-02-06 PROCEDURE — 99203 OFFICE O/P NEW LOW 30 MIN: CPT | Mod: 25,S$PBB,, | Performed by: PEDIATRICS

## 2023-02-06 PROCEDURE — 99213 OFFICE O/P EST LOW 20 MIN: CPT | Mod: PBBFAC,25 | Performed by: PEDIATRICS

## 2023-02-06 PROCEDURE — 59025 PRENATAL TESTING - NST/AFI: ICD-10-PCS | Mod: 26,,, | Performed by: OBSTETRICS & GYNECOLOGY

## 2023-02-06 PROCEDURE — 3078F DIAST BP <80 MM HG: CPT | Mod: CPTII,,, | Performed by: PEDIATRICS

## 2023-02-06 PROCEDURE — 76825 ECHO EXAM OF FETAL HEART: CPT | Mod: 26,S$PBB,, | Performed by: PEDIATRICS

## 2023-02-06 PROCEDURE — 3078F PR MOST RECENT DIASTOLIC BLOOD PRESSURE < 80 MM HG: ICD-10-PCS | Mod: CPTII,,, | Performed by: PEDIATRICS

## 2023-02-06 PROCEDURE — 76827 ECHO EXAM OF FETAL HEART: CPT | Mod: 26,S$PBB,, | Performed by: PEDIATRICS

## 2023-02-06 PROCEDURE — 99999 PR PBB SHADOW E&M-EST. PATIENT-LVL III: CPT | Mod: PBBFAC,,, | Performed by: PEDIATRICS

## 2023-02-06 PROCEDURE — 3075F PR MOST RECENT SYSTOLIC BLOOD PRESS GE 130-139MM HG: ICD-10-PCS | Mod: CPTII,,, | Performed by: PEDIATRICS

## 2023-02-06 PROCEDURE — 1159F MED LIST DOCD IN RCRD: CPT | Mod: CPTII,,, | Performed by: PEDIATRICS

## 2023-02-06 PROCEDURE — 3075F SYST BP GE 130 - 139MM HG: CPT | Mod: CPTII,,, | Performed by: PEDIATRICS

## 2023-02-06 PROCEDURE — 93325 DOPPLER ECHO COLOR FLOW MAPG: CPT | Mod: 26,S$PBB,, | Performed by: PEDIATRICS

## 2023-02-06 PROCEDURE — 93325 DOPPLER ECHO COLOR FLOW MAPG: CPT | Mod: PBBFAC | Performed by: PEDIATRICS

## 2023-02-06 PROCEDURE — 99999 PR PBB SHADOW E&M-EST. PATIENT-LVL III: ICD-10-PCS | Mod: PBBFAC,,, | Performed by: PEDIATRICS

## 2023-02-06 PROCEDURE — 1160F PR REVIEW ALL MEDS BY PRESCRIBER/CLIN PHARMACIST DOCUMENTED: ICD-10-PCS | Mod: CPTII,,, | Performed by: PEDIATRICS

## 2023-02-06 PROCEDURE — 93325 PR DOPPLER COLOR FLOW VELOCITY MAP: ICD-10-PCS | Mod: 26,S$PBB,, | Performed by: PEDIATRICS

## 2023-02-06 PROCEDURE — 99203 PR OFFICE/OUTPT VISIT, NEW, LEVL III, 30-44 MIN: ICD-10-PCS | Mod: 25,S$PBB,, | Performed by: PEDIATRICS

## 2023-02-06 PROCEDURE — 1160F RVW MEDS BY RX/DR IN RCRD: CPT | Mod: CPTII,,, | Performed by: PEDIATRICS

## 2023-02-06 PROCEDURE — 76827 PR  SO2 FETAL HEART DOPPLER: ICD-10-PCS | Mod: 26,S$PBB,, | Performed by: PEDIATRICS

## 2023-02-06 PROCEDURE — 76827 ECHO EXAM OF FETAL HEART: CPT | Mod: PBBFAC | Performed by: PEDIATRICS

## 2023-02-06 PROCEDURE — 76825 ECHO EXAM OF FETAL HEART: CPT | Mod: PBBFAC | Performed by: PEDIATRICS

## 2023-02-06 PROCEDURE — 3008F BODY MASS INDEX DOCD: CPT | Mod: CPTII,,, | Performed by: PEDIATRICS

## 2023-02-06 PROCEDURE — 76825 PR  SO2 FETAL HEART: ICD-10-PCS | Mod: 26,S$PBB,, | Performed by: PEDIATRICS

## 2023-02-06 PROCEDURE — 59025 FETAL NON-STRESS TEST: CPT

## 2023-02-06 PROCEDURE — 3008F PR BODY MASS INDEX (BMI) DOCUMENTED: ICD-10-PCS | Mod: CPTII,,, | Performed by: PEDIATRICS

## 2023-02-06 PROCEDURE — 1159F PR MEDICATION LIST DOCUMENTED IN MEDICAL RECORD: ICD-10-PCS | Mod: CPTII,,, | Performed by: PEDIATRICS

## 2023-02-06 NOTE — PROGRESS NOTES
I had the pleasure of evaluating Kaylie Miri Dangelo who is now 22 y.o.  and carrying her 1st pregnancy at 30 2/7 weeks gestation. She was referred for evaluation of the fetal heart due to inability to demonstrate fetal cardiac anatomy and function on routine ultrasound examinations.      Current Outpatient Medications:     prenatal 25/iron fum/folic/dha (PRENATAL-1 ORAL), Take by mouth. 2 tablets daily, Disp: , Rfl:     acyclovir (ZOVIRAX) 400 MG tablet, Take 400 mg by mouth., Disp: , Rfl:     aspirin (ECOTRIN) 81 MG EC tablet, Take 1 tablet (81 mg total) by mouth once daily. (Patient not taking: Reported on 2/6/2023), Disp: 90 tablet, Rfl: 6    cetirizine (ZYRTEC) 10 MG tablet, Take 1 tablet (10 mg total) by mouth daily as needed (Congestion). (Patient not taking: Reported on 2/6/2023), Disp: 7 tablet, Rfl: 0    clonidine (CATAPRES) 0.1 MG tablet, Take 0.1 mg by mouth every evening., Disp: , Rfl:     fluoxetine (PROZAC) 40 MG capsule, Take 40 mg by mouth once daily., Disp: , Rfl:     hydrochlorothiazide (HYDRODIURIL) 12.5 MG Tab, Take 12.5 mg by mouth once daily., Disp: , Rfl:     lamotrigine (LAMICTAL) 100 MG tablet, Take 100 mg by mouth once daily., Disp: , Rfl:     medroxyPROGESTERone (PROVERA) 10 MG tablet, Take 1 tablet (10 mg total) by mouth once daily., Disp: 10 tablet, Rfl: 0    melatonin 5 mg TbDL, Take by mouth., Disp: , Rfl:     METHYLPHENIDATE HCL (CONCERTA ORAL), Take 45 mg by mouth., Disp: , Rfl:     montelukast (SINGULAIR) 5 MG chewable tablet, Take 5 mg by mouth every evening., Disp: , Rfl:     mupirocin (BACTROBAN) 2 % ointment, Apply topically 3 (three) times daily. (Patient not taking: Reported on 2/6/2023), Disp: 30 g, Rfl: 0    ondansetron (ZOFRAN-ODT) 4 MG TbDL, Take 1 tablet (4 mg total) by mouth every 6 (six) hours as needed. (Patient not taking: Reported on 2/6/2023), Disp: 30 tablet, Rfl: 1    paroxetine (PAXIL-CR) 25 MG 24 hr tablet, Take 25 mg by mouth every morning., Disp: , Rfl:    Review of patient's allergies indicates:   Allergen Reactions    Citrus and derivatives     Penicillins     Lamotrigine Rash     Reported by mother on 18       Maternal factors increasing risk for congenital heart disease:  There is no family history to suggest increased risk for congenital heart disease.  Paternal factors increasing risk for congenital heart disease: Unremarkable.    FETAL ECHOCARDIOGRAM (preliminary):  Study technically very limited by available acoustic windows-  Normal fetal cardiac connections and function for estimated gestational age in very limited views available.  (Full report in electronic medical record)    I reviewed the strengths and weaknesses of fetal echocardiography including the insufficient sensitivity to rule out many septal defects, anomalies of pulmonary and systemic veins, arch anomalies, and some valvar abnormalities. I also discussed that  resolution of the ductus arteriosus and foramen ovale (normal fetal structures) cannot be assured by fetal evaluation. Finally, I reviewed today's echocardiogram that is technically very limited but demonstrates normal anatomical connections and function for the estimated gestational age in the images that could be obtained. Within the limitations imposed by fetal physiology, I would expect a reasonable probability that this infant will be born with a normal heart and no evidence to suggest that there is a hemodynamically significant congenital cardiac lesion present. I do not believe that further attempts to evaluate the fetal cardiac anatomy will be successful and would recommend that we plan for an elective evaluation of the infant before discharge from the nursery or sooner if there are any signs of cardiovascular compromise after delivery.      I answered all the parent's questions. I also provided an information sheet reviewing the fetal physiology and how this compares to normal  physiology. This  also  includes a reiteration of the limitations of fetal echocardiography that I have discussed today. I have not scheduled another fetal evaluation; however, if questions arise later during gestation or after delivery, I would be happy to assist in any way. Ms. Dangelo is comfortable with the plan.    RECOMMENDATIONS:  Elective evaluation of the infant preferably after 24 hours to allow for normal  transitional physiology to proceed and before discharge from the nursery.  Sooner if there are any signs of hemodynamic compromise.            30 minutes of total time spent on the encounter, which includes face to face time and non-face to face time preparing to see the patient (eg, review of tests), obtaining and/or reviewing separately obtained history, documenting clinical information in the electronic or other health record, independently interpreting results (not separately reported) and communicating results to the patient/family/caregiver, or care coordination (not separately reported).

## 2023-02-06 NOTE — LETTER
February 6, 2023        Ladonna Tsai MD  [address]  [place] [address]             [facility] - Maternal Fetal Medicine ([place])  [address]  [facility] [address]  Phone: [phone]  Fax: [phone]   Patient: Kaylie Dangelo   MR Number: [id number]   YOB: 2000   Date of Visit: 2/6/2023       Dear Dr. Tsai:    Thank you for referring Kaylie Dangelo to me for evaluation. Attached you will find relevant portions of my assessment and plan of care.    If you have questions, please do not hesitate to call me. I look forward to following Kaylie Dangelo along with you.    Sincerely,      Benigno Bray MD            CC  No Recipients    Enclosure

## 2023-02-13 ENCOUNTER — TELEPHONE (OUTPATIENT)
Dept: OBSTETRICS AND GYNECOLOGY | Facility: CLINIC | Age: 23
End: 2023-02-13
Payer: MEDICAID

## 2023-02-14 ENCOUNTER — CLINICAL SUPPORT (OUTPATIENT)
Dept: OBSTETRICS AND GYNECOLOGY | Facility: CLINIC | Age: 23
End: 2023-02-14
Payer: MEDICAID

## 2023-02-14 ENCOUNTER — ROUTINE PRENATAL (OUTPATIENT)
Dept: OBSTETRICS AND GYNECOLOGY | Facility: CLINIC | Age: 23
End: 2023-02-14
Payer: MEDICAID

## 2023-02-14 VITALS — SYSTOLIC BLOOD PRESSURE: 95 MMHG | DIASTOLIC BLOOD PRESSURE: 60 MMHG | BODY MASS INDEX: 63.41 KG/M2 | WEIGHT: 293 LBS

## 2023-02-14 DIAGNOSIS — Z23 NEED FOR TDAP VACCINATION: Primary | ICD-10-CM

## 2023-02-14 DIAGNOSIS — Z23 NEED FOR TDAP VACCINATION: ICD-10-CM

## 2023-02-14 DIAGNOSIS — Z3A.31 PREGNANCY WITH 31 COMPLETED WEEKS GESTATION: Primary | ICD-10-CM

## 2023-02-14 PROCEDURE — 99213 PR OFFICE/OUTPT VISIT, EST, LEVL III, 20-29 MIN: ICD-10-PCS | Mod: TH,S$PBB,, | Performed by: REGISTERED NURSE

## 2023-02-14 PROCEDURE — 99999 PR PBB SHADOW E&M-EST. PATIENT-LVL III: ICD-10-PCS | Mod: PBBFAC,,, | Performed by: REGISTERED NURSE

## 2023-02-14 PROCEDURE — 99213 OFFICE O/P EST LOW 20 MIN: CPT | Mod: TH,S$PBB,, | Performed by: REGISTERED NURSE

## 2023-02-14 PROCEDURE — 99213 OFFICE O/P EST LOW 20 MIN: CPT | Mod: PBBFAC | Performed by: REGISTERED NURSE

## 2023-02-14 PROCEDURE — 99999 PR PBB SHADOW E&M-EST. PATIENT-LVL III: CPT | Mod: PBBFAC,,, | Performed by: REGISTERED NURSE

## 2023-02-14 PROCEDURE — 90471 IMMUNIZATION ADMIN: CPT | Mod: PBBFAC

## 2023-02-14 PROCEDURE — 90715 TDAP VACCINE 7 YRS/> IM: CPT | Mod: PBBFAC

## 2023-02-14 NOTE — PROGRESS NOTES
Here for Tdap injection. Patient without complaint of pain at this time, injection given. Tolerated well no pain noted post injection advised to wait in lobby 15 minutes and report any adverse reactions.     Site:ld

## 2023-02-14 NOTE — PROGRESS NOTES
Here for routine OB appt at 31w3d, with no complaints. She reports instance of feeling dizzy and almost blacking out at work. BP today is 95/60. She has last drank water yesterday, reports drinking fruit drink this morning. Discussed increasing water intake to at least 5 bottles per day. DONNY precautions given if recurs. Reports good FM.  Denies LOF, denies VB. Reports zaheer-benítez contractions. She reports she has a breast pump and plans on breastfeeding. Discussed choosing a pediatrician. She is going to follow up with [facility] [address]. TDAP today. Discussed SIDS precautions and partner quitting smoking.  Reviewed warning signs of Labor and Preeclampsia.  Daily FM counts reinforced. PNT appointments reviewed.  F/U scheduled 2 weeks with Dr. Tsai.

## 2023-02-17 ENCOUNTER — PATIENT MESSAGE (OUTPATIENT)
Dept: MATERNAL FETAL MEDICINE | Facility: CLINIC | Age: 23
End: 2023-02-17
Payer: MEDICAID

## 2023-02-18 ENCOUNTER — PATIENT MESSAGE (OUTPATIENT)
Dept: OTHER | Facility: OTHER | Age: 23
End: 2023-02-18
Payer: MEDICAID

## 2023-02-20 ENCOUNTER — PROCEDURE VISIT (OUTPATIENT)
Dept: MATERNAL FETAL MEDICINE | Facility: CLINIC | Age: 23
End: 2023-02-20
Payer: MEDICAID

## 2023-02-20 DIAGNOSIS — Z36.89 ENCOUNTER FOR ULTRASOUND TO ASSESS FETAL GROWTH: ICD-10-CM

## 2023-02-20 PROCEDURE — 76816 US MFM PROCEDURE (VIEWPOINT): ICD-10-PCS | Mod: 26,S$PBB,, | Performed by: OBSTETRICS & GYNECOLOGY

## 2023-02-20 PROCEDURE — 76816 OB US FOLLOW-UP PER FETUS: CPT | Mod: PBBFAC | Performed by: OBSTETRICS & GYNECOLOGY

## 2023-03-06 ENCOUNTER — TELEPHONE (OUTPATIENT)
Dept: OBSTETRICS AND GYNECOLOGY | Facility: CLINIC | Age: 23
End: 2023-03-06
Payer: MEDICAID

## 2023-03-06 NOTE — TELEPHONE ENCOUNTER
----- Message from Adrian Enriquez sent at 3/6/2023 12:08 PM CST -----  Regarding: Schedule  Name of Who is Calling: SHAYNE VANCE [[id number]]           What is the request in detail: Patient is requesting a call back for assistance with scheduling prenatal testing. Please assist.           Can the clinic reply by [facility]: No           [facility] Back [facility]: [phone]

## 2023-03-07 ENCOUNTER — HOSPITAL ENCOUNTER (OUTPATIENT)
Dept: PERINATAL CARE | Facility: OTHER | Age: 23
Discharge: HOME | End: 2023-03-07
Attending: OBSTETRICS & GYNECOLOGY
Payer: MEDICAID

## 2023-03-07 DIAGNOSIS — Z86.79 HISTORY OF CHRONIC HYPERTENSION: ICD-10-CM

## 2023-03-07 DIAGNOSIS — E66.01 MORBID OBESITY: ICD-10-CM

## 2023-03-07 PROCEDURE — 59025 PRENATAL TESTING - NST/AFI: ICD-10-PCS | Mod: 26,,, | Performed by: OBSTETRICS & GYNECOLOGY

## 2023-03-07 PROCEDURE — 76815 OB US LIMITED FETUS(S): CPT

## 2023-03-07 PROCEDURE — 59025 FETAL NON-STRESS TEST: CPT

## 2023-03-07 PROCEDURE — 76815 PRENATAL TESTING - NST/AFI: ICD-10-PCS | Mod: 26,,, | Performed by: OBSTETRICS & GYNECOLOGY

## 2023-03-07 PROCEDURE — 76815 OB US LIMITED FETUS(S): CPT | Mod: 26,,, | Performed by: OBSTETRICS & GYNECOLOGY

## 2023-03-07 PROCEDURE — 59025 FETAL NON-STRESS TEST: CPT | Mod: 26,,, | Performed by: OBSTETRICS & GYNECOLOGY

## 2023-03-10 ENCOUNTER — HOSPITAL ENCOUNTER (OUTPATIENT)
Dept: PERINATAL CARE | Facility: OTHER | Age: 23
Discharge: HOME | End: 2023-03-10
Attending: REGISTERED NURSE
Payer: MEDICAID

## 2023-03-10 ENCOUNTER — ROUTINE PRENATAL (OUTPATIENT)
Dept: OBSTETRICS AND GYNECOLOGY | Facility: CLINIC | Age: 23
End: 2023-03-10
Payer: MEDICAID

## 2023-03-10 ENCOUNTER — PATIENT MESSAGE (OUTPATIENT)
Dept: OBSTETRICS AND GYNECOLOGY | Facility: CLINIC | Age: 23
End: 2023-03-10
Payer: MEDICAID

## 2023-03-10 ENCOUNTER — TELEPHONE (OUTPATIENT)
Dept: OBSTETRICS AND GYNECOLOGY | Facility: CLINIC | Age: 23
End: 2023-03-10
Payer: MEDICAID

## 2023-03-10 VITALS — BODY MASS INDEX: 63.25 KG/M2 | DIASTOLIC BLOOD PRESSURE: 80 MMHG | WEIGHT: 293 LBS | SYSTOLIC BLOOD PRESSURE: 112 MMHG

## 2023-03-10 DIAGNOSIS — E66.01 MORBID OBESITY: ICD-10-CM

## 2023-03-10 DIAGNOSIS — Z86.79 HISTORY OF CHRONIC HYPERTENSION: ICD-10-CM

## 2023-03-10 DIAGNOSIS — Z3A.34 34 WEEKS GESTATION OF PREGNANCY: ICD-10-CM

## 2023-03-10 DIAGNOSIS — G47.30 SLEEP APNEA IN ADULT: ICD-10-CM

## 2023-03-10 DIAGNOSIS — O09.93 SUPERVISION OF HIGH-RISK PREGNANCY, THIRD TRIMESTER: Primary | ICD-10-CM

## 2023-03-10 PROCEDURE — 99212 OFFICE O/P EST SF 10 MIN: CPT | Mod: PBBFAC,25,TH | Performed by: OBSTETRICS & GYNECOLOGY

## 2023-03-10 PROCEDURE — 76818 PRENATAL TESTING - NST/AFI: ICD-10-PCS | Mod: 26,,, | Performed by: OBSTETRICS & GYNECOLOGY

## 2023-03-10 PROCEDURE — 76818 FETAL BIOPHYS PROFILE W/NST: CPT

## 2023-03-10 PROCEDURE — 99999 PR PBB SHADOW E&M-EST. PATIENT-LVL II: CPT | Mod: PBBFAC,,, | Performed by: OBSTETRICS & GYNECOLOGY

## 2023-03-10 PROCEDURE — 99999 PR PBB SHADOW E&M-EST. PATIENT-LVL II: ICD-10-PCS | Mod: PBBFAC,,, | Performed by: OBSTETRICS & GYNECOLOGY

## 2023-03-10 PROCEDURE — 99213 PR OFFICE/OUTPT VISIT, EST, LEVL III, 20-29 MIN: ICD-10-PCS | Mod: TH,S$PBB,, | Performed by: OBSTETRICS & GYNECOLOGY

## 2023-03-10 PROCEDURE — 76818 FETAL BIOPHYS PROFILE W/NST: CPT | Mod: 26,,, | Performed by: OBSTETRICS & GYNECOLOGY

## 2023-03-10 PROCEDURE — 99213 OFFICE O/P EST LOW 20 MIN: CPT | Mod: TH,S$PBB,, | Performed by: OBSTETRICS & GYNECOLOGY

## 2023-03-10 NOTE — Clinical Note
"Please write the following in a work excuse letter and send to patient via the portal:  "It is my medical opinion that Kaylie Dangelo should discontinue her employment for the duration of her pregnancy due to the high risk nature of her pregnancy. Please feel free to contact me if you have any questions or concerns."  Thanks! "

## 2023-03-10 NOTE — PROGRESS NOTES
@34w6d: Denies CTX, LOF, VB. +FM. Does note increased discomfort and abdominal pain, mainly at night time, makes it difficult for her to roll over in bed. Reports some shortness of breath, dizziness, and fatigue but denies chest pain. BP normal today. Notes some increased BLE edema. Denies HA or scotoma, no s/sx of PreE. She has not yet eaten today. Offered to check cervix today due to increased pelvic pressure but patient declines. I counseled her that if the shortness of breath worsens or she has concerns of labor to be seen in the [facility]. She voiced understanding.  Desires to start maternity leave. Counseled that FMLA may subtract this from the number of weeks she is entitled to postpartum, patient voiced understanding. Will write work letter today and send via the portal.  CHTN - patient being treated as though she has CHTN and has been normotensive throughout pregnancy. Continue weekly PNT and plan for IOL 38-39 weeks. Will discuss delivery timing at next visit.   3T labs, consents, and GBS next visit. Discuss PP contraception and pediatricians next visit.   RTC 2 weeks OB f/u. PTL/PPROM/PreE/FM precautions reviewed.

## 2023-03-11 ENCOUNTER — PATIENT MESSAGE (OUTPATIENT)
Dept: OTHER | Facility: OTHER | Age: 23
End: 2023-03-11
Payer: MEDICAID

## 2023-03-15 ENCOUNTER — PATIENT MESSAGE (OUTPATIENT)
Dept: OBSTETRICS AND GYNECOLOGY | Facility: CLINIC | Age: 23
End: 2023-03-15
Payer: MEDICAID

## 2023-03-15 ENCOUNTER — HOSPITAL ENCOUNTER (EMERGENCY)
Facility: OTHER | Age: 23
Discharge: HOME | End: 2023-03-15
Attending: OBSTETRICS & GYNECOLOGY
Payer: MEDICAID

## 2023-03-15 VITALS
OXYGEN SATURATION: 97 % | SYSTOLIC BLOOD PRESSURE: 130 MMHG | TEMPERATURE: 98 F | DIASTOLIC BLOOD PRESSURE: 60 MMHG | HEART RATE: 87 BPM | RESPIRATION RATE: 16 BRPM

## 2023-03-15 DIAGNOSIS — Z65.8 INADEQUATE SOCIAL SUPPORT: Primary | ICD-10-CM

## 2023-03-15 DIAGNOSIS — R06.02 SHORTNESS OF BREATH: ICD-10-CM

## 2023-03-15 DIAGNOSIS — M79.89 LEG SWELLING: ICD-10-CM

## 2023-03-15 DIAGNOSIS — R00.2 INTERMITTENT PALPITATIONS: ICD-10-CM

## 2023-03-15 DIAGNOSIS — Z3A.35 35 WEEKS GESTATION OF PREGNANCY: ICD-10-CM

## 2023-03-15 LAB
ALBUMIN SERPL BCP-MCNC: 2.4 G/DL (ref 3.5–5.2)
ALP SERPL-CCNC: 116 U/L (ref 55–135)
ALT SERPL W/O P-5'-P-CCNC: 29 U/L (ref 10–44)
ANION GAP SERPL CALC-SCNC: 8 MMOL/L (ref 8–16)
AST SERPL-CCNC: 27 U/L (ref 10–40)
BACTERIA #/AREA URNS HPF: ABNORMAL /HPF
BASOPHILS # BLD AUTO: 0.01 K/UL (ref 0–0.2)
BASOPHILS NFR BLD: 0.1 % (ref 0–1.9)
BILIRUB SERPL-MCNC: 0.9 MG/DL (ref 0.1–1)
BILIRUB UR QL STRIP: ABNORMAL
BUN SERPL-MCNC: 5 MG/DL (ref 6–20)
CALCIUM SERPL-MCNC: 9.1 MG/DL (ref 8.7–10.5)
CHLORIDE SERPL-SCNC: 109 MMOL/L (ref 95–110)
CLARITY UR: ABNORMAL
CO2 SERPL-SCNC: 21 MMOL/L (ref 23–29)
COLOR UR: ABNORMAL
CREAT SERPL-MCNC: 0.6 MG/DL (ref 0.5–1.4)
CREAT UR-MCNC: 317.2 MG/DL (ref 15–325)
DIFFERENTIAL METHOD: ABNORMAL
EOSINOPHIL # BLD AUTO: 0 K/UL (ref 0–0.5)
EOSINOPHIL NFR BLD: 0 % (ref 0–8)
ERYTHROCYTE [DISTWIDTH] IN BLOOD BY AUTOMATED COUNT: 13 % (ref 11.5–14.5)
EST. GFR  (NO RACE VARIABLE): >60 ML/MIN/1.73 M^2
GLUCOSE SERPL-MCNC: 75 MG/DL (ref 70–110)
GLUCOSE UR QL STRIP: NEGATIVE
HCT VFR BLD AUTO: 33.5 % (ref 37–48.5)
HGB BLD-MCNC: 11.2 G/DL (ref 12–16)
HGB UR QL STRIP: NEGATIVE
HIV 1+2 AB+HIV1 P24 AG SERPL QL IA: NEGATIVE
HYALINE CASTS #/AREA URNS LPF: 3 /LPF
IMM GRANULOCYTES # BLD AUTO: 0.03 K/UL (ref 0–0.04)
IMM GRANULOCYTES NFR BLD AUTO: 0.4 % (ref 0–0.5)
KETONES UR QL STRIP: ABNORMAL
LEUKOCYTE ESTERASE UR QL STRIP: ABNORMAL
LYMPHOCYTES # BLD AUTO: 2.2 K/UL (ref 1–4.8)
LYMPHOCYTES NFR BLD: 29.6 % (ref 18–48)
MCH RBC QN AUTO: 27.9 PG (ref 27–31)
MCHC RBC AUTO-ENTMCNC: 33.4 G/DL (ref 32–36)
MCV RBC AUTO: 84 FL (ref 82–98)
MICROSCOPIC COMMENT: ABNORMAL
MONOCYTES # BLD AUTO: 0.6 K/UL (ref 0.3–1)
MONOCYTES NFR BLD: 8 % (ref 4–15)
NEUTROPHILS # BLD AUTO: 4.6 K/UL (ref 1.8–7.7)
NEUTROPHILS NFR BLD: 61.9 % (ref 38–73)
NITRITE UR QL STRIP: NEGATIVE
NRBC BLD-RTO: 0 /100 WBC
PH UR STRIP: 6 [PH] (ref 5–8)
PLATELET # BLD AUTO: 322 K/UL (ref 150–450)
PMV BLD AUTO: 10.6 FL (ref 9.2–12.9)
POTASSIUM SERPL-SCNC: 3.6 MMOL/L (ref 3.5–5.1)
PROT SERPL-MCNC: 6.4 G/DL (ref 6–8.4)
PROT UR QL STRIP: ABNORMAL
PROT UR-MCNC: 30 MG/DL (ref 0–15)
PROT/CREAT UR: 0.09 MG/G{CREAT} (ref 0–0.2)
RBC # BLD AUTO: 4.01 M/UL (ref 4–5.4)
RBC #/AREA URNS HPF: 4 /HPF (ref 0–4)
RPR SER QL: NORMAL
SODIUM SERPL-SCNC: 138 MMOL/L (ref 136–145)
SP GR UR STRIP: 1.03 (ref 1–1.03)
SQUAMOUS #/AREA URNS HPF: 58 /HPF
URN SPEC COLLECT METH UR: ABNORMAL
UROBILINOGEN UR STRIP-ACNC: ABNORMAL EU/DL
WBC # BLD AUTO: 7.42 K/UL (ref 3.9–12.7)
WBC #/AREA URNS HPF: 26 /HPF (ref 0–5)

## 2023-03-15 PROCEDURE — 86592 SYPHILIS TEST NON-TREP QUAL: CPT

## 2023-03-15 PROCEDURE — 99285 EMERGENCY DEPT VISIT HI MDM: CPT | Mod: 25

## 2023-03-15 PROCEDURE — 80053 COMPREHEN METABOLIC PANEL: CPT

## 2023-03-15 PROCEDURE — 81000 URINALYSIS NONAUTO W/SCOPE: CPT

## 2023-03-15 PROCEDURE — 93005 ELECTROCARDIOGRAM TRACING: CPT

## 2023-03-15 PROCEDURE — 93010 ELECTROCARDIOGRAM REPORT: CPT | Mod: ,,, | Performed by: INTERNAL MEDICINE

## 2023-03-15 PROCEDURE — 96360 HYDRATION IV INFUSION INIT: CPT

## 2023-03-15 PROCEDURE — 84156 ASSAY OF PROTEIN URINE: CPT

## 2023-03-15 PROCEDURE — 93010 EKG 12-LEAD: ICD-10-PCS | Mod: ,,, | Performed by: INTERNAL MEDICINE

## 2023-03-15 PROCEDURE — 87086 URINE CULTURE/COLONY COUNT: CPT

## 2023-03-15 PROCEDURE — 85025 COMPLETE CBC W/AUTO DIFF WBC: CPT

## 2023-03-15 PROCEDURE — 87389 HIV-1 AG W/HIV-1&-2 AB AG IA: CPT

## 2023-03-15 PROCEDURE — 59025 FETAL NON-STRESS TEST: CPT

## 2023-03-15 NOTE — DISCHARGE INSTRUCTIONS
Notified to call if leaking fluid, vaginal bleeding, korin every 5 mins for 2 hours, decreased fetal movement.    L&D [phone]  OB clinic [phone]

## 2023-03-15 NOTE — ED NOTES
"RN bringing pt to [facility] when pt begins crying. Stated she has "a lot going on right now". Denies depression, SI, or HI. States she is safe at home but feeling alone in this process and abandoned by significant other. Dr. Mcgovern notified.  "

## 2023-03-15 NOTE — ED PROVIDER NOTES
Encounter Date: 3/15/2023       History     Chief Complaint   Patient presents with    Leg Swelling    Shortness of Breath     HPI  Kaylie Miri Dangelo is a 22 y.o. [id number] at 35w4d presents complaining of 1wk h/o increased SOB, HA, LE swelling. Reports progressed SOB from on exertion to now occasionally at rest, reports chest tightness but no wheezing. Reports frontal bilateral headaches daily, tylenol and resting helps temporarily. Reports occasional dizziness and heart racing, no syncopal episodes. Reports increased leg/ankle/foot swelling this past week. Reports N/V few days ago. States she is well hydrated and tolerating PO intake without issues. Denies fevers/chills, chest pain, abdominal or pelvic pain, contractions, vaginal bleeding, LOF, BM or urinary complaints, rashes. Normal fetal movement.    She presents to [facility] alone, reports minimal support system throughout this pregnancy, is tearful during visit. Having issues with FOB, no family or friends locally. Denies thoughts of self-harm or suicidal ideation. She is interested in resources for support.    This IUP is complicated by morbid obesity (BMI 63), cHTN, poor fetal cardiac views (fetal echo 24h after delivery), bipolar/depression (h/o SI w/hospitalizations), incarceration during this pregnancy, transient transaminitis, childhood asthma (no meds).      Review of patient's allergies indicates:   Allergen Reactions    Citrus and derivatives     Penicillins     Lamotrigine Rash     Reported by mother on 18     Past Medical History:   Diagnosis Date    ADHD, predominantly hyperactive-impulsive subtype     Asthma     Bipolar 1 disorder     Depression     Hypertension     Insomnia      No past surgical history on file.  Family History   Problem Relation Age of Onset    Diabetes Mother     Heart failure Mother     Breast cancer Neg Hx     Colon cancer Neg Hx     Ovarian cancer Neg Hx      Social History     Tobacco Use    Smoking status: Former     Smokeless tobacco: Never   Substance Use Topics    Alcohol use: No    Drug use: Not Currently     Types: Marijuana     Review of Systems   Constitutional:  Negative for chills and fever.   HENT:  Negative for sore throat.    Eyes:  Negative for visual disturbance.   Respiratory:  Positive for shortness of breath. Negative for cough.    Cardiovascular:  Positive for leg swelling. Negative for chest pain.   Gastrointestinal:  Positive for nausea and vomiting. Negative for abdominal pain and diarrhea.   Genitourinary:  Negative for difficulty urinating, pelvic pain, vaginal bleeding and vaginal discharge.   Musculoskeletal:  Negative for myalgias.   Skin:  Negative for rash.   Neurological:  Negative for dizziness and headaches.   Psychiatric/Behavioral:  Negative for confusion.      Physical Exam     Initial Vitals   BP Pulse Resp Temp SpO2   03/15/23 1304 03/15/23 1304 03/15/23 1304 03/15/23 1304 03/15/23 1352   (!) 106/51 107 16 98.4 °F (36.9 °C) 98 %      MAP       --                Physical Exam    Vitals reviewed.  Constitutional: She appears well-developed and well-nourished. She is not diaphoretic. No distress.   HENT:   Head: Normocephalic and atraumatic.   Eyes: EOM are normal.   Neck:   Normal range of motion.  Cardiovascular:  Normal rate, regular rhythm and normal heart sounds.     Exam reveals no gallop and no friction rub.       No murmur heard.  Pulmonary/Chest: Breath sounds normal. No respiratory distress. She has no wheezes.   Abdominal: Abdomen is soft. There is no abdominal tenderness.   gravid There is no rebound and no guarding.   Musculoskeletal:         General: Edema (mild, to level of ankles/low shins, symmetric bilaterally) present. No tenderness. Normal range of motion.      Cervical back: Normal range of motion.     Neurological: She is alert and oriented to person, place, and time.   Skin: Skin is warm and dry.   Psychiatric: She has a normal mood and affect. Her behavior is normal.  Judgment and thought content normal.   Tearful during assessment, expressing minimal support during pregnancy, issues with FOB, no family for friends nearby, feeling isolated and alone       ED Course   Obtain Fetal nonstress test (NST)    Date/Time: 3/15/2023 1:48 PM  Performed by: Ladonna Mcgovern MD  Authorized by: Ladonna Mcgovern MD     Nonstress Test:     Variability:  6-25 BPM    Decelerations:  None    Accelerations:  15 bpm    Baseline:  140    Contractions:  Not present  Labs Reviewed   CBC W/ AUTO DIFFERENTIAL - Abnormal; Notable for the following components:       Result Value    Hemoglobin 11.2 (*)     Hematocrit 33.5 (*)     All other components within normal limits   COMPREHENSIVE METABOLIC PANEL - Abnormal; Notable for the following components:    CO2 21 (*)     BUN 5 (*)     Albumin 2.4 (*)     All other components within normal limits   PROTEIN / CREATININE RATIO, URINE - Abnormal; Notable for the following components:    Protein, Urine Random 30 (*)     All other components within normal limits    Narrative:     Specimen Source->Urine   URINALYSIS - Abnormal; Notable for the following components:    Color, UA Orange (*)     Appearance, UA Cloudy (*)     Protein, UA 1+ (*)     Ketones, UA 3+ (*)     Bilirubin (UA) 1+ (*)     Urobilinogen, UA 4.0-6.0 (*)     Leukocytes, UA 3+ (*)     All other components within normal limits   URINALYSIS MICROSCOPIC - Abnormal; Notable for the following components:    WBC, UA 26 (*)     Bacteria Few (*)     Hyaline Casts, UA 3 (*)     All other components within normal limits   CULTURE, URINE   CULTURE, URINE   RPR    Narrative:     Release to patient->Immediate   HIV 1 / 2 ANTIBODY    Narrative:     Release to patient->Immediate     Lab Results   Component Value Date    HGB 11.2 (L) 03/15/2023    HCT 33.5 (L) 03/15/2023     03/15/2023    CREATININE 0.6 03/15/2023    AST 27 03/15/2023    ALT 29 03/15/2023    UTPCR 0.09 03/15/2023          ECG Results               EKG 12-lead (In process)  Result time 03/16/23 07:53:13      In process by Interface, Lab In [facility] (03/16/23 07:53:13)                   Narrative:    Test Reason : R00.2,    Vent. Rate : 087 BPM     Atrial Rate : 087 BPM     P-R Int : 154 ms          QRS Dur : 076 ms      QT Int : 354 ms       P-R-T Axes : 040 028 -12 degrees     QTc Int : 425 ms    Normal sinus rhythm  Nonspecific T wave abnormality  Abnormal ECG  When compared with ECG of 15-MAR-2023 14:13,  No significant change was found    Referred By: [email or web address]   SELF           Confirmed By:                                      EKG 12-lead (In process)  Result time 03/16/23 07:53:29      In process by Interface, Lab In [facility] (03/16/23 07:53:29)                   Narrative:    Test Reason :     Vent. Rate : 083 BPM     Atrial Rate : 083 BPM     P-R Int : 158 ms          QRS Dur : 080 ms      QT Int : 362 ms       P-R-T Axes : 047 031 -03 degrees     QTc Int : 425 ms    Normal sinus rhythm with sinus arrhythmia  Nonspecific T wave abnormality  Abnormal ECG  When compared with ECG of 05-JAN-2023 09:48,  No significant change was found    Referred By: [email or web address]   SELF           Confirmed By:                                   Imaging Results              X-Ray Chest PA And Lateral (Final result)  Result time 03/15/23 15:40:30      Final result by Sharlene Jaquez MD (03/15/23 15:40:30)                   Impression:      No acute finding      Electronically signed by: Sharlene Jaquez MD  Date:    03/15/2023  Time:    15:40               Narrative:    EXAMINATION:  XR CHEST PA AND LATERAL    CLINICAL HISTORY:  Shortness of breath    TECHNIQUE:  PA and lateral views of the chest were performed.    COMPARISON:  None    FINDINGS:  Heart size is not enlarged.  There is no pleural effusion.  The lungs are clear.  The osseous structures demonstrate no significant abnormality.                                       Medications   lactated ringers bolus 500 mL (0 mLs  Intravenous Stopped 3/15/23 1530)     Medical Decision Making:   ED Management:   at 35w4d with 1wk h/o increased SOB, LE swelling, HA, dizziness, N/V  VSS, no acute distress  Exam benign  Udip as above, 3+ ketones > 500cc bolus LR given   Ucx pending  CBC, CMP wnl  P:Cr 0.09  3T labs collected  EKG NSR x2  CXR negative  NST R&R  [place] without contractions    Discussed normal discomforts of pregnancy including SOB, LE swelling  Incentive spirometer given to patient, educated on use  Discussed using extra pillows for support when sleeping  Patient with limited support system and h/o bipolar/depression, interested in resources, will send referral for psychology/psychiatry/social work  Strict ED return precautions discussed with patient  All questions answered  Patient discharged in stable condition      Ladonna Mcgovern MD   OB/GYN PGY1  [facility]            Attending Attestation:   Physician Attestation Statement for Resident:  As the supervising MD   Physician Attestation Statement: I have personally seen and examined this patient.   I agree with the above history.  -:   As the supervising MD I agree with the above PE.     As the supervising MD I agree with the above treatment, course, plan, and disposition.   I was personally present during the critical portions of the procedure(s) performed by the resident and was immediately available in the ED to provide services and assistance as needed during the entire procedure.  I have reviewed and agree with the residents interpretation of the following: lab data, x-rays and EKG.             Attending ED Notes:   I have personally seen and examined the patient. I agree with the resident's note. Questions welcomed and answered to patient satisfaction.      @ 35w4d presenting for shortness of breath  Cardiac and respiratory workup negative, see above.   Psych history significant for anxiety and depression, no current SI, but history of attempt. Pt  agrees to outpatient followup with Dr. Khan  NST: 140/mod+accels/-decels    Agree with discharge to home, and given the following instructions: Resume normal activities, encouraged to hydrate well (3L or 100oz of fluids daily). Return to the OB ED for acute concerns such as difficulty breathing, dizziness, headache, vision changes, vaginal bleeding, frequent or painful contractions, loss of fluid or decreased fetal movement.     Return to clinic in 1 week, to have FU with Dr. Khan.     Sumi Park MD  3/22/2023 9:22 PM                     Clinical Impression:   Final diagnoses:  [R06.02] Shortness of breath (Primary)  [R00.2] Intermittent palpitations  [Z3A.35] 35 weeks gestation of pregnancy  [M79.89] Leg swelling        ED Disposition Condition    Discharge Stable          ED Prescriptions    None       Follow-up Information    None          Sumi Park MD  03/22/23 0715

## 2023-03-16 DIAGNOSIS — F31.9 BIPOLAR 1 DISORDER: ICD-10-CM

## 2023-03-16 DIAGNOSIS — O09.73: ICD-10-CM

## 2023-03-16 DIAGNOSIS — O99.343 MENTAL DISORDER AFFECTING PREGNANCY IN THIRD TRIMESTER: Primary | ICD-10-CM

## 2023-03-16 LAB
BACTERIA UR CULT: NORMAL
BACTERIA UR CULT: NORMAL

## 2023-03-22 ENCOUNTER — TELEPHONE (OUTPATIENT)
Dept: PSYCHIATRY | Facility: CLINIC | Age: 23
End: 2023-03-22
Payer: MEDICAID

## 2023-03-24 ENCOUNTER — ANESTHESIA (OUTPATIENT)
Dept: OBSTETRICS AND GYNECOLOGY | Facility: OTHER | Age: 23
End: 2023-03-24
Payer: MEDICAID

## 2023-03-24 ENCOUNTER — HOSPITAL ENCOUNTER (OUTPATIENT)
Dept: PERINATAL CARE | Facility: OTHER | Age: 23
Discharge: HOME | End: 2023-03-24
Attending: REGISTERED NURSE
Payer: MEDICAID

## 2023-03-24 ENCOUNTER — ANESTHESIA EVENT (OUTPATIENT)
Dept: OBSTETRICS AND GYNECOLOGY | Facility: OTHER | Age: 23
End: 2023-03-24
Payer: MEDICAID

## 2023-03-24 ENCOUNTER — HOSPITAL ENCOUNTER (INPATIENT)
Facility: OTHER | Age: 23
LOS: 3 days | Discharge: HOME | End: 2023-03-27
Attending: OBSTETRICS & GYNECOLOGY | Admitting: OBSTETRICS & GYNECOLOGY
Payer: MEDICAID

## 2023-03-24 ENCOUNTER — ROUTINE PRENATAL (OUTPATIENT)
Dept: OBSTETRICS AND GYNECOLOGY | Facility: CLINIC | Age: 23
End: 2023-03-24
Payer: MEDICAID

## 2023-03-24 VITALS — DIASTOLIC BLOOD PRESSURE: 84 MMHG | SYSTOLIC BLOOD PRESSURE: 122 MMHG | WEIGHT: 293 LBS | BODY MASS INDEX: 62.65 KG/M2

## 2023-03-24 DIAGNOSIS — F31.9 BIPOLAR 1 DISORDER: ICD-10-CM

## 2023-03-24 DIAGNOSIS — Z86.79 HISTORY OF CHRONIC HYPERTENSION: ICD-10-CM

## 2023-03-24 DIAGNOSIS — Z3A.36 36 WEEKS GESTATION OF PREGNANCY: ICD-10-CM

## 2023-03-24 DIAGNOSIS — E66.01 MORBID OBESITY: ICD-10-CM

## 2023-03-24 DIAGNOSIS — O16.3 MATERNAL HYPERTENSION IN THIRD TRIMESTER: ICD-10-CM

## 2023-03-24 DIAGNOSIS — O99.213 OBESITY AFFECTING PREGNANCY IN THIRD TRIMESTER, ANTEPARTUM: ICD-10-CM

## 2023-03-24 DIAGNOSIS — O09.93 SUPERVISION OF HIGH-RISK PREGNANCY, THIRD TRIMESTER: Primary | ICD-10-CM

## 2023-03-24 DIAGNOSIS — G47.30 SLEEP APNEA IN ADULT: ICD-10-CM

## 2023-03-24 DIAGNOSIS — O28.8 NON-REACTIVE NST (NON-STRESS TEST): ICD-10-CM

## 2023-03-24 DIAGNOSIS — O09.73: ICD-10-CM

## 2023-03-24 DIAGNOSIS — O36.8390 NON-REASSURING ELECTRONIC FETAL MONITORING TRACING: Primary | ICD-10-CM

## 2023-03-24 LAB
ABO + RH BLD: NORMAL
ALBUMIN SERPL BCP-MCNC: 2.4 G/DL (ref 3.5–5.2)
ALP SERPL-CCNC: 123 U/L (ref 55–135)
ALT SERPL W/O P-5'-P-CCNC: 43 U/L (ref 10–44)
ANION GAP SERPL CALC-SCNC: 7 MMOL/L (ref 8–16)
AST SERPL-CCNC: 27 U/L (ref 10–40)
BASOPHILS # BLD AUTO: 0.02 K/UL (ref 0–0.2)
BASOPHILS NFR BLD: 0.3 % (ref 0–1.9)
BILIRUB SERPL-MCNC: 0.6 MG/DL (ref 0.1–1)
BLD GP AB SCN CELLS X3 SERPL QL: NORMAL
BUN SERPL-MCNC: 9 MG/DL (ref 6–20)
CALCIUM SERPL-MCNC: 8.8 MG/DL (ref 8.7–10.5)
CHLORIDE SERPL-SCNC: 108 MMOL/L (ref 95–110)
CO2 SERPL-SCNC: 22 MMOL/L (ref 23–29)
CREAT SERPL-MCNC: 0.6 MG/DL (ref 0.5–1.4)
CREAT UR-MCNC: 292.6 MG/DL (ref 15–325)
DIFFERENTIAL METHOD: ABNORMAL
EOSINOPHIL # BLD AUTO: 0 K/UL (ref 0–0.5)
EOSINOPHIL NFR BLD: 0 % (ref 0–8)
ERYTHROCYTE [DISTWIDTH] IN BLOOD BY AUTOMATED COUNT: 13.1 % (ref 11.5–14.5)
EST. GFR  (NO RACE VARIABLE): >60 ML/MIN/1.73 M^2
GLUCOSE SERPL-MCNC: 84 MG/DL (ref 70–110)
HCT VFR BLD AUTO: 32.4 % (ref 37–48.5)
HGB BLD-MCNC: 10.7 G/DL (ref 12–16)
IMM GRANULOCYTES # BLD AUTO: 0.03 K/UL (ref 0–0.04)
IMM GRANULOCYTES NFR BLD AUTO: 0.4 % (ref 0–0.5)
LYMPHOCYTES # BLD AUTO: 2.4 K/UL (ref 1–4.8)
LYMPHOCYTES NFR BLD: 33.8 % (ref 18–48)
MCH RBC QN AUTO: 27.1 PG (ref 27–31)
MCHC RBC AUTO-ENTMCNC: 33 G/DL (ref 32–36)
MCV RBC AUTO: 82 FL (ref 82–98)
MONOCYTES # BLD AUTO: 0.5 K/UL (ref 0.3–1)
MONOCYTES NFR BLD: 7.4 % (ref 4–15)
NEUTROPHILS # BLD AUTO: 4.1 K/UL (ref 1.8–7.7)
NEUTROPHILS NFR BLD: 58.1 % (ref 38–73)
NRBC BLD-RTO: 0 /100 WBC
PLATELET # BLD AUTO: 315 K/UL (ref 150–450)
PMV BLD AUTO: 10.7 FL (ref 9.2–12.9)
POTASSIUM SERPL-SCNC: 3.4 MMOL/L (ref 3.5–5.1)
PROT SERPL-MCNC: 6.3 G/DL (ref 6–8.4)
PROT UR-MCNC: 28 MG/DL (ref 0–15)
PROT/CREAT UR: 0.1 MG/G{CREAT} (ref 0–0.2)
RBC # BLD AUTO: 3.95 M/UL (ref 4–5.4)
SODIUM SERPL-SCNC: 137 MMOL/L (ref 136–145)
SPECIMEN OUTDATE: NORMAL
WBC # BLD AUTO: 7.07 K/UL (ref 3.9–12.7)

## 2023-03-24 PROCEDURE — 59025 PR FETAL 2N-STRESS TEST: ICD-10-PCS | Mod: 26,59,ICN, | Performed by: OBSTETRICS & GYNECOLOGY

## 2023-03-24 PROCEDURE — 76815 OB US LIMITED FETUS(S): CPT | Mod: 26,ICN,, | Performed by: OBSTETRICS & GYNECOLOGY

## 2023-03-24 PROCEDURE — 99214 PR OFFICE/OUTPT VISIT, EST, LEVL IV, 30-39 MIN: ICD-10-PCS | Mod: TH,S$PBB,, | Performed by: OBSTETRICS & GYNECOLOGY

## 2023-03-24 PROCEDURE — 99284 PR EMERGENCY DEPT VISIT,LEVEL IV: ICD-10-PCS | Mod: 25,ICN,, | Performed by: OBSTETRICS & GYNECOLOGY

## 2023-03-24 PROCEDURE — 25000003 PHARM REV CODE 250: Performed by: OBSTETRICS & GYNECOLOGY

## 2023-03-24 PROCEDURE — 76818 FETAL BIOPHYS PROFILE W/NST: CPT | Mod: 26,,, | Performed by: OBSTETRICS & GYNECOLOGY

## 2023-03-24 PROCEDURE — 85025 COMPLETE CBC W/AUTO DIFF WBC: CPT | Performed by: OBSTETRICS & GYNECOLOGY

## 2023-03-24 PROCEDURE — 84156 ASSAY OF PROTEIN URINE: CPT | Performed by: OBSTETRICS & GYNECOLOGY

## 2023-03-24 PROCEDURE — 63600175 PHARM REV CODE 636 W HCPCS: Performed by: OBSTETRICS & GYNECOLOGY

## 2023-03-24 PROCEDURE — 76815 PR  US,PREGNANT UTERUS,LIMITED, 1/> FETUSES: ICD-10-PCS | Mod: 26,ICN,, | Performed by: OBSTETRICS & GYNECOLOGY

## 2023-03-24 PROCEDURE — 99212 OFFICE O/P EST SF 10 MIN: CPT | Mod: PBBFAC,TH,25 | Performed by: OBSTETRICS & GYNECOLOGY

## 2023-03-24 PROCEDURE — 87081 CULTURE SCREEN ONLY: CPT | Performed by: OBSTETRICS & GYNECOLOGY

## 2023-03-24 PROCEDURE — 59200 INSERT CERVICAL DILATOR: CPT

## 2023-03-24 PROCEDURE — 59025 FETAL NON-STRESS TEST: CPT | Mod: 26,59,ICN, | Performed by: OBSTETRICS & GYNECOLOGY

## 2023-03-24 PROCEDURE — 11000001 HC ACUTE MED/SURG PRIVATE ROOM

## 2023-03-24 PROCEDURE — 99285 EMERGENCY DEPT VISIT HI MDM: CPT | Mod: 25,27

## 2023-03-24 PROCEDURE — 76818 PRENATAL TESTING - NST/AFI: ICD-10-PCS | Mod: 26,,, | Performed by: OBSTETRICS & GYNECOLOGY

## 2023-03-24 PROCEDURE — 86900 BLOOD TYPING SEROLOGIC ABO: CPT | Performed by: OBSTETRICS & GYNECOLOGY

## 2023-03-24 PROCEDURE — 99999 PR PBB SHADOW E&M-EST. PATIENT-LVL II: ICD-10-PCS | Mod: PBBFAC,,, | Performed by: OBSTETRICS & GYNECOLOGY

## 2023-03-24 PROCEDURE — 72100002 HC LABOR CARE, 1ST 8 HOURS

## 2023-03-24 PROCEDURE — 99284 EMERGENCY DEPT VISIT MOD MDM: CPT | Mod: 25,ICN,, | Performed by: OBSTETRICS & GYNECOLOGY

## 2023-03-24 PROCEDURE — 99214 OFFICE O/P EST MOD 30 MIN: CPT | Mod: TH,S$PBB,, | Performed by: OBSTETRICS & GYNECOLOGY

## 2023-03-24 PROCEDURE — 80053 COMPREHEN METABOLIC PANEL: CPT | Performed by: OBSTETRICS & GYNECOLOGY

## 2023-03-24 PROCEDURE — 99999 PR PBB SHADOW E&M-EST. PATIENT-LVL II: CPT | Mod: PBBFAC,,, | Performed by: OBSTETRICS & GYNECOLOGY

## 2023-03-24 PROCEDURE — 76818 FETAL BIOPHYS PROFILE W/NST: CPT

## 2023-03-24 PROCEDURE — 59025 FETAL NON-STRESS TEST: CPT

## 2023-03-24 RX ORDER — CARBOPROST TROMETHAMINE 250 UG/ML
250 INJECTION, SOLUTION INTRAMUSCULAR
Status: DISCONTINUED | OUTPATIENT
Start: 2023-03-24 | End: 2023-03-27 | Stop reason: HOSPADM

## 2023-03-24 RX ORDER — SIMETHICONE 80 MG
1 TABLET,CHEWABLE ORAL 4 TIMES DAILY PRN
Status: DISCONTINUED | OUTPATIENT
Start: 2023-03-24 | End: 2023-03-27 | Stop reason: HOSPADM

## 2023-03-24 RX ORDER — TRANEXAMIC ACID 10 MG/ML
1000 INJECTION, SOLUTION INTRAVENOUS ONCE AS NEEDED
Status: DISCONTINUED | OUTPATIENT
Start: 2023-03-24 | End: 2023-03-27 | Stop reason: HOSPADM

## 2023-03-24 RX ORDER — SODIUM CHLORIDE, SODIUM LACTATE, POTASSIUM CHLORIDE, CALCIUM CHLORIDE 600; 310; 30; 20 MG/100ML; MG/100ML; MG/100ML; MG/100ML
INJECTION, SOLUTION INTRAVENOUS CONTINUOUS
Status: DISCONTINUED | OUTPATIENT
Start: 2023-03-24 | End: 2023-03-27 | Stop reason: HOSPADM

## 2023-03-24 RX ORDER — METHYLERGONOVINE MALEATE 0.2 MG/ML
200 INJECTION INTRAVENOUS
Status: DISCONTINUED | OUTPATIENT
Start: 2023-03-24 | End: 2023-03-27 | Stop reason: HOSPADM

## 2023-03-24 RX ORDER — OXYTOCIN/RINGER'S LACTATE 30/500 ML
95 PLASTIC BAG, INJECTION (ML) INTRAVENOUS ONCE AS NEEDED
Status: DISCONTINUED | OUTPATIENT
Start: 2023-03-24 | End: 2023-03-27 | Stop reason: HOSPADM

## 2023-03-24 RX ORDER — CALCIUM CARBONATE 200(500)MG
500 TABLET,CHEWABLE ORAL 3 TIMES DAILY PRN
Status: DISCONTINUED | OUTPATIENT
Start: 2023-03-24 | End: 2023-03-27 | Stop reason: HOSPADM

## 2023-03-24 RX ORDER — OXYTOCIN/RINGER'S LACTATE 30/500 ML
95 PLASTIC BAG, INJECTION (ML) INTRAVENOUS ONCE
Status: DISCONTINUED | OUTPATIENT
Start: 2023-03-24 | End: 2023-03-27 | Stop reason: HOSPADM

## 2023-03-24 RX ORDER — ONDANSETRON 8 MG/1
8 TABLET, ORALLY DISINTEGRATING ORAL EVERY 8 HOURS PRN
Status: DISCONTINUED | OUTPATIENT
Start: 2023-03-24 | End: 2023-03-27 | Stop reason: HOSPADM

## 2023-03-24 RX ORDER — OXYTOCIN 10 [USP'U]/ML
10 INJECTION, SOLUTION INTRAMUSCULAR; INTRAVENOUS ONCE AS NEEDED
Status: DISCONTINUED | OUTPATIENT
Start: 2023-03-24 | End: 2023-03-27 | Stop reason: HOSPADM

## 2023-03-24 RX ORDER — PROCHLORPERAZINE EDISYLATE 5 MG/ML
5 INJECTION INTRAMUSCULAR; INTRAVENOUS EVERY 6 HOURS PRN
Status: DISCONTINUED | OUTPATIENT
Start: 2023-03-24 | End: 2023-03-27 | Stop reason: HOSPADM

## 2023-03-24 RX ORDER — OXYTOCIN/RINGER'S LACTATE 30/500 ML
0-30 PLASTIC BAG, INJECTION (ML) INTRAVENOUS CONTINUOUS
Status: DISCONTINUED | OUTPATIENT
Start: 2023-03-24 | End: 2023-03-27 | Stop reason: HOSPADM

## 2023-03-24 RX ORDER — MISOPROSTOL 200 UG/1
800 TABLET ORAL
Status: DISCONTINUED | OUTPATIENT
Start: 2023-03-24 | End: 2023-03-27 | Stop reason: HOSPADM

## 2023-03-24 RX ORDER — MISOPROSTOL 100 MCG
25 TABLET ORAL ONCE
Status: DISCONTINUED | OUTPATIENT
Start: 2023-03-24 | End: 2023-03-27 | Stop reason: HOSPADM

## 2023-03-24 RX ORDER — MISOPROSTOL 100 MCG
25 TABLET ORAL EVERY 4 HOURS PRN
Status: DISCONTINUED | OUTPATIENT
Start: 2023-03-24 | End: 2023-03-27 | Stop reason: HOSPADM

## 2023-03-24 RX ORDER — OXYTOCIN/RINGER'S LACTATE 30/500 ML
334 PLASTIC BAG, INJECTION (ML) INTRAVENOUS ONCE AS NEEDED
Status: COMPLETED | OUTPATIENT
Start: 2023-03-24 | End: 2023-03-25

## 2023-03-24 RX ORDER — LIDOCAINE HYDROCHLORIDE 10 MG/ML
10 INJECTION INFILTRATION; PERINEURAL ONCE AS NEEDED
Status: DISCONTINUED | OUTPATIENT
Start: 2023-03-24 | End: 2023-03-27 | Stop reason: HOSPADM

## 2023-03-24 RX ORDER — MISOPROSTOL 200 UG/1
800 TABLET ORAL ONCE AS NEEDED
Status: DISCONTINUED | OUTPATIENT
Start: 2023-03-24 | End: 2023-03-27 | Stop reason: HOSPADM

## 2023-03-24 RX ORDER — CLINDAMYCIN PHOSPHATE 900 MG/50ML
900 INJECTION, SOLUTION INTRAVENOUS ONCE AS NEEDED
Status: DISCONTINUED | OUTPATIENT
Start: 2023-03-24 | End: 2023-03-27 | Stop reason: HOSPADM

## 2023-03-24 RX ORDER — OXYTOCIN/RINGER'S LACTATE 30/500 ML
334 PLASTIC BAG, INJECTION (ML) INTRAVENOUS ONCE
Status: DISCONTINUED | OUTPATIENT
Start: 2023-03-24 | End: 2023-03-27 | Stop reason: HOSPADM

## 2023-03-24 RX ORDER — TERBUTALINE SULFATE 1 MG/ML
0.25 INJECTION SUBCUTANEOUS
Status: DISCONTINUED | OUTPATIENT
Start: 2023-03-24 | End: 2023-03-27 | Stop reason: HOSPADM

## 2023-03-24 RX ORDER — DIPHENOXYLATE HYDROCHLORIDE AND ATROPINE SULFATE 2.5; .025 MG/1; MG/1
1 TABLET ORAL 4 TIMES DAILY PRN
Status: DISCONTINUED | OUTPATIENT
Start: 2023-03-24 | End: 2023-03-27 | Stop reason: HOSPADM

## 2023-03-24 RX ADMIN — MISOPROSTOL 25 MCG: 100 TABLET ORAL at 06:03

## 2023-03-24 RX ADMIN — SODIUM CHLORIDE, POTASSIUM CHLORIDE, SODIUM LACTATE AND CALCIUM CHLORIDE: 600; 310; 30; 20 INJECTION, SOLUTION INTRAVENOUS at 02:03

## 2023-03-24 RX ADMIN — VANCOMYCIN HYDROCHLORIDE 2000 MG: 500 INJECTION, POWDER, LYOPHILIZED, FOR SOLUTION INTRAVENOUS at 11:03

## 2023-03-24 RX ADMIN — VANCOMYCIN HYDROCHLORIDE 2000 MG: 500 INJECTION, POWDER, LYOPHILIZED, FOR SOLUTION INTRAVENOUS at 02:03

## 2023-03-24 RX ADMIN — SODIUM CHLORIDE, POTASSIUM CHLORIDE, SODIUM LACTATE AND CALCIUM CHLORIDE: 600; 310; 30; 20 INJECTION, SOLUTION INTRAVENOUS at 11:03

## 2023-03-24 RX ADMIN — Medication 4 MILLI-UNITS/MIN: at 03:03

## 2023-03-24 NOTE — PROGRESS NOTES
@36w6d: Doing well, denies CTX, LOF, VB. +FM. Increased pelvic pressure.  GBS collected today. Pt reports PCN allergy as a child, does not remember what her reaction was.   3T labs done. Delivery consents signed.   Discussed contraception. Interested in immediate PP IUD. Consents signed today. CHTN - BP normal. Continue weekly PNT. Plan for IOL at 38 weeks per MFM/ACOG recommendations. Induction scheduled 4/6 @ 0500.   Pediatrician picked out but patient cannot remember the name.  Considering breast feeding.   present with patient today.   RTC 1 week OB f/u. Labor/PROM/PreE/FM precautions reviewed.   Patient's care significantly limited by social determinants of health.  present with her today. SW actively involved in patient's care during pregnancy.

## 2023-03-24 NOTE — PROGRESS NOTES
LABOR PROGRESS NOTE    S:  MD to bedside.  Complaints: None.      O: Temp:  [98.4 °F (36.9 °C)] 98.4 °F (36.9 °C)  Pulse:  [] 92  Resp:  [18] 18  SpO2:  [97 %-98 %] 97 %  BP: (107-122)/(64-84) 118/78    FHT: 140BPM/+moderate beat to beat variability/+accels/-decels   CTX: irregular  Cx:  FT/50/-3  Correa placed in cervix under direct visualization and bulb inflated        ASSESSMENT & PLAN  22 y.o.  at 36w6d, labor induction  FHT reassuring  Start pitocin  Start vancomycin (GBS unknown, PCN allergy)  Continue Close Maternal/Fetal Monitoring.

## 2023-03-24 NOTE — ED NOTES
Pt presents to [facility] from PNT with a 6/8 BPP and a non-reactive NST. Pt complains of intense pain from the top of her abdomen down and complains of contractions; both started last night. Pt denies LOF and vaginal bleeding. Pt reports +FM. Pt roomed and MD notified.

## 2023-03-24 NOTE — ED PROVIDER NOTES
Encounter Date: 3/24/2023       History     Chief Complaint   Patient presents with    Prolonged monitoring      Patient is a 22 y.o. G1 at 36 6/7 weeks presents to [facility] from PNT for non-reactive NST and BPP 6/8 for breathing.  Patient was seen in [facility] last week for non-reassuring NST.  She reports contractions every 30 minutes since last night. She denies LOF and VB.  PNC with Dr. Tsai c/kami MO, CHTN no meds, and h/o bipolar d/o no meds since 16y.o.  Patient desires PP IUD placement.    Review of patient's allergies indicates:   Allergen Reactions    Citrus and derivatives     Penicillins     Lamotrigine Rash     Reported by mother on 12/19/18     Past Medical History:   Diagnosis Date    ADHD, predominantly hyperactive-impulsive subtype     Asthma     Bipolar 1 disorder     Depression     Hypertension     Insomnia      No past surgical history on file.  Family History   Problem Relation Age of Onset    Diabetes Mother     Heart failure Mother     Breast cancer Neg Hx     Colon cancer Neg Hx     Ovarian cancer Neg Hx      Social History     Tobacco Use    Smoking status: Former    Smokeless tobacco: Never   Substance Use Topics    Alcohol use: No    Drug use: Not Currently     Types: Marijuana     Review of Systems   Constitutional:  Negative for chills and fever.   HENT:  Negative for congestion.    Eyes:  Negative for visual disturbance.   Respiratory:  Negative for cough and shortness of breath.    Cardiovascular:  Positive for leg swelling. Negative for chest pain and palpitations.   Gastrointestinal:  Negative for constipation, diarrhea, nausea and vomiting.   Genitourinary:  Positive for pelvic pain. Negative for dysuria, genital sores, vaginal bleeding and vaginal discharge.   Skin:  Positive for rash (eczema).   Neurological:  Negative for headaches.   Psychiatric/Behavioral:  Negative for agitation and dysphoric mood.      Physical Exam     Initial Vitals   BP Pulse Resp Temp SpO2   03/24/23 1210 03/24/23  1209 03/24/23 1210 03/24/23 1210 03/24/23 1209   107/64 101 18 98.4 °F (36.9 °C) 97 %      MAP       --                Physical Exam    Vitals reviewed.  Constitutional: She appears well-developed and well-nourished. She is not diaphoretic. No distress.   HENT:   Head: Normocephalic and atraumatic.   Cardiovascular:  Normal rate.           Pulmonary/Chest: No respiratory distress.   Abdominal: There is no abdominal tenderness. There is no rebound and no guarding.   Musculoskeletal:         General: Edema (trace LE edema) present. No tenderness.     Neurological: She is alert and oriented to person, place, and time.   Skin: Skin is warm and dry. Rash (eczema on the feet) noted.   Psychiatric: She has a normal mood and affect. Her behavior is normal. Judgment and thought content normal.     OB LABOR EXAM:   Pre-Term Labor: No.   Membranes ruptured: No.   Method: Other (see comments).         Station: -3.   Effacement: 50%.       Comments: Exam by Dr. Tsai in office today     ED Course   Fetal non-stress test    Date/Time: 3/24/2023 1:31 PM  Performed by: Dianne Degroot MD  Authorized by: Dianne Degroot MD     Nonstress Test:     Variability:  6-25 BPM    Decelerations:  None    Accelerations:  10 bpm    Acoustic Stimulator: No      Baseline:  130    Uterine Irritability: No      Contractions:  Not present  Biophysical Profile:     Nonstress Test Interpretation: equivocal      Overall Impression:  Non - reassuring  Post-procedure:     Patient tolerance:  Patient tolerated the procedure well with no immediate complications  Labs Reviewed - No data to display       Imaging Results    None            Medications - No data to display  Medical Decision Making:   Initial Assessment:   22 y.o. G1 at 36 6/7 weeks with CHTN no meds and BPP 6/8 and equivocal NST.  ED Management:  NST with minimal variability and accels 10 x 10, no decels  Normotensive in [facility] with no pre-E symptoms.  Exam in clinic not c/w  labor.  D/W MFM, recommends induction.  Sonogram confirms cephalic presentation.  Anticipate .  Patient desires PP IUD placement  Delivery, blood and IUD consents signed and on chart.                        Clinical Impression:   Final diagnoses:  [O36.8390] Non-reassuring electronic fetal monitoring tracing (Primary)  [Z3A.36] 36 weeks gestation of pregnancy  [O16.3] Maternal hypertension in third trimester        ED Disposition Condition    Send to L&D Stable                Dianne Degroot MD  [phone]

## 2023-03-24 NOTE — PROGRESS NOTES
LABOR NOTE    Resident to bedside for routine cervical check.    S:  Complaints: No.  Epidural working:  not applicable      O: BP (!) 122/59   Pulse 85   Temp 98.2 °F (36.8 °C) (Oral)   Resp 20   Wt (!) 155.4 kg (342 lb 9.5 oz)   LMP 2022 (Exact Date)   SpO2 97%   Breastfeeding No   BMI 62.65 kg/m²       FHT: Cat 1 (reassuring), baseline 140, mod renato, + accels, - decels  CTX: unable to detect using wireless monitors  SVE: 1cm, mortensen firmly in place, vaginal cytotec placed    TIMELINE:  1500: 0.5/50/-3, mortensen placed, pit started  1745: pit stopped  1830: 1cm, mortensen firmly in place, vaginal cytotec placed    A/P: 22 y.o.  at 36w6d, admitted for IOL 2/2 non-reactive NST    Labor management:  - Continue Close Maternal/Fetal Monitoring  - Recheck 4 hours or PRN      Ladonna Mcgovern MD   OB/GYN PGY1  [facility]

## 2023-03-24 NOTE — ANESTHESIA PREPROCEDURE EVALUATION
[facility]  Anesthesia Pre-Operative Evaluation         Patient Name: Kaylie Dangelo  YOB: 2000  MRN: [id number]    2023      Kaylie Daneglo is a 22 y.o. female  at 36w6d who presents for IOL. Current IUP has been c/b morbid obesity BMI 62, cHTN no meds, and h/o bipolar/depression/anxiety not on medication, and sleep apnea. Of note, also with limited social/parental support and complicated dynamics with patient's partner.    She denies previous neuraxial anesthesia.     She denies history of asthma, bleeding or coagulation disorders, spine abnormalities, or previous back surgeries.      OB History    Para Term  AB Living   1 0 0 0 0 0   SAB IAB Ectopic Multiple Live Births   0 0 0 0 0      # Outcome Date GA Lbr Asif/2nd Weight Sex Delivery Anes PTL Lv   1 Current                Review of patient's allergies indicates:   Allergen Reactions    Citrus and derivatives     Penicillins     Lamotrigine Rash     Reported by mother on 18       Wt Readings from Last 1 Encounters:   23 1345 (!) 155.4 kg (342 lb 9.5 oz)       BP Readings from Last 3 Encounters:   23 118/78   23 122/84   03/15/23 130/60       Patient Active Problem List   Diagnosis    Depression    Bipolar 1 disorder    Abnormal weight gain    Acanthosis nigricans    Suspected sleep apnea    Sleep apnea in adult    Screen for STD (sexually transmitted disease)    Pre-conception counseling    Pregnancy    Eczema    Initial obstetric visit, second trimester    Morbid obesity    Chronic hypertension    Supervision of high-risk pregnancy, third trimester    30 weeks gestation of pregnancy    Encounter for fetal screening for congenital cardiac abnormalities    Obesity affecting pregnancy in third trimester, antepartum    History of chronic hypertension       No past surgical history on file.    Tobacco Use: Medium Risk    Smoking Tobacco Use: Former     Smokeless Tobacco Use: Never    Passive Exposure: Not on file     Alcohol Use: Not on file     Social History     Substance and Sexual Activity   Drug Use Not Currently    Types: Marijuana         Chemistry        Component Value Date/Time     03/24/2023 1349    K 3.4 (L) 03/24/2023 1349     03/24/2023 1349    CO2 22 (L) 03/24/2023 1349    BUN 9 03/24/2023 1349    CREATININE 0.6 03/24/2023 1349    GLU 84 03/24/2023 1349        Component Value Date/Time    CALCIUM 8.8 03/24/2023 1349    ALKPHOS 123 03/24/2023 1349    AST 27 03/24/2023 1349    ALT 43 03/24/2023 1349    BILITOT 0.6 03/24/2023 1349            Lab Results   Component Value Date    WBC 7.07 03/24/2023    HGB 10.7 (L) 03/24/2023    HCT 32.4 (L) 03/24/2023     03/24/2023       No results found for: LABPROT, INR, APTT      Pre-op Assessment    I have reviewed the Patient Summary Reports.     I have reviewed the Nursing Notes. I have reviewed the NPO Status.   I have reviewed the Medications.     Review of Systems  Anesthesia Hx:  No previous Anesthesia  Denies Family Hx of Anesthesia complications.   Denies Personal Hx of Anesthesia complications.   Cardiovascular:   Hypertension Denies MI.  Denies CAD.    Denies Dysrhythmias.   Denies Angina.  Denies HAMILTON.    Pulmonary:   Asthma asymptomatic Sleep Apnea    Renal/:  Renal/ Normal     Hepatic/GI:  Hepatic/GI Normal    Neurological:  Neurology Normal    Psych:   Psychiatric History anxiety          Physical Exam  General: Well nourished, Cooperative, Alert and Oriented    Airway:  Mallampati: III   Mouth Opening: Small, but > 3cm  TM Distance: Normal  Tongue: Normal  Neck ROM: Normal ROM  Neck: Girth Increased    Dental:  Intact        Anesthesia Plan  Type of Anesthesia, risks & benefits discussed:    Anesthesia Type: Gen ETT, MAC, Epidural, Spinal, CSE  Intra-op Monitoring Plan: Standard ASA Monitors  Post Op Pain Control Plan: multimodal analgesia, IV/PO Opioids PRN and epidural  analgesia  Induction:  IV  Informed Consent: Informed consent signed with the Patient and all parties understand the risks and agree with anesthesia plan.  All questions answered.   ASA Score: 3  Day of Surgery Review of History & Physical: H&P Update referred to the surgeon/provider.    Ready For Surgery From Anesthesia Perspective.     .

## 2023-03-24 NOTE — H&P
HISTORY AND PHYSICAL                                                OBSTETRICS          Subjective:       Kaylie Dangelo is a 22 y.o.  female with IUP at 36w6d weeks gestation who presents as an admit from [facility] for induction of labor for category II tracing.    Patient reports irregular contractions, denies vaginal bleeding, denies LOF.   Fetal Movement: normal.    This IUP is complicated by cHTN, MO, and GBS unknown.    Review of Systems   Constitutional:  Negative for fever.   Respiratory:  Negative for shortness of breath.    Cardiovascular:  Negative for chest pain.   Gastrointestinal:  Negative for abdominal pain, nausea and vomiting.   Endocrine: Negative for hot flashes.   Genitourinary:  Negative for menstrual problem.   Integumentary:  Negative for breast mass, nipple discharge and breast skin changes.   Neurological:  Negative for headaches.   Hematological:  Does not bruise/bleed easily.   Psychiatric/Behavioral:  Negative for depression.    Breast: Negative for mass, mastodynia, nipple discharge and skin changes    PMHx:   Past Medical History:   Diagnosis Date    ADHD, predominantly hyperactive-impulsive subtype     Asthma     Bipolar 1 disorder     Depression     Hypertension     Insomnia        PSHx: No past surgical history on file.    All:   Review of patient's allergies indicates:   Allergen Reactions    Citrus and derivatives     Penicillins     Lamotrigine Rash     Reported by mother on 18       Meds:   Medications Prior to Admission   Medication Sig Dispense Refill Last Dose    medroxyPROGESTERone (PROVERA) 10 MG tablet Take 1 tablet (10 mg total) by mouth once daily. 10 tablet 0     prenatal 25/iron fum/folic/dha (PRENATAL-1 ORAL) Take by mouth. 2 tablets daily          SH:   Social History     Socioeconomic History    Marital status: Single   Tobacco Use    Smoking status: Former    Smokeless tobacco: Never   Substance and Sexual Activity    Alcohol use: No    Drug use:  Not Currently     Types: Marijuana    Sexual activity: Yes     Partners: Male     Birth control/protection: None   Social History Narrative    Currently lives at [facility]. Admitted 2015. Expected to be the for at lest 3-6 months. In [facility] custody.        FH:   Family History   Problem Relation Age of Onset    Diabetes Mother     Heart failure Mother     Breast cancer Neg Hx     Colon cancer Neg Hx     Ovarian cancer Neg Hx        OBHx:   OB History    Para Term  AB Living   1 0 0 0 0 0   SAB IAB Ectopic Multiple Live Births   0 0 0 0 0      # Outcome Date GA Lbr Asif/2nd Weight Sex Delivery Anes PTL Lv   1 Current                Objective:       /64   Pulse 77   Temp 98.2 °F (36.8 °C) (Oral)   Resp 20   Wt (!) 155.4 kg (342 lb 9.5 oz)   LMP 2022 (Exact Date)   SpO2 97%   Breastfeeding No   BMI 62.65 kg/m²     Vitals:    23 1449 23 1453 23 1455 23 1457   BP:   119/64    Pulse: 87 78 78 77   Resp:       Temp:       TempSrc:       SpO2:       Weight:           General:   alert, appears stated age and cooperative, no apparent distress   HENT:  normocephalic, atraumatic   Eyes:  extraocular movements and conjunctivae normal   Neck:  supple, range of motion normal, no thyromegaly   Lungs:   no respiratory distress   Heart:   regular rate   Abdomen:  soft, non-tender, non-distended but gravid, no rebound or guarding    Extremities negative edema, negative erythema   FHT: 130, minimal to moderate BTBV, +accels (10x10), -decels;  Cat 2 (reassuring)                 TOCO: Irregular contractions   Presentations: cephalic by ultrasound   Cervix:     Dilation: 0.5    Effacement: 50%    Station:  -3    Consistency: medium    Position: posterior     EFW by Leopold's: difficult to assess due to body habitus    Recent Growth Scan: EFW plotting at the 27% and the AC plotting at the 44%. The EFW is 1937 g at 32wga    Lab Review  Blood Type B POS  GBBS:  unknown - collected today  Rubella: Immune  RPR: non-reactive  HIV: negative  HepB: negative       Assessment:       36w6d weeks gestation admitted for induction of labor    Active Hospital Problems    Diagnosis  POA    *Non-reactive NST (non-stress test) [O28.8]  Unknown      Resolved Hospital Problems   No resolved problems to display.          Plan:   IOL   Risks, benefits, alternatives and possible complications have been discussed in detail with the patient.   - Consents signed and to chart  - Admit to Labor and Delivery unit   - Epidural per Anesthesia  - Draw CBC, T&S  - Notify Staff  - Recheck in 4 hrs or PRN  - Induction started with mortensen balloon and pitocin    2. cHTN  - BP: (102-122)/(62-84) 119/64  - Controlled without medications  - PreE labs collected on admit  - Patient currently asymptomatic  - Continue to closely monitor    3. MO  - Pre-pregnancy BMI 65  - Lovenox 40 mg BID postpartum  - SCDs during labor when patient in bed    4. GBS unknown  - High penicillin allergy  - Vancomycin during labor for prophylaxis      Post-Partum Hemorrhage risk - medium    Nadir Humphrey M.D.  OB/GYN PGY-3

## 2023-03-24 NOTE — PROGRESS NOTES
03/24/23 1707   TeleStyodit Vargas Note - Strip   Strip Reviewed by [place] Nurse? Yes   TeleStork [place] Note - Communication   [place] Nurse Communicated with Bedside Nurse Regarding: Fetal Status   TeleStork [place] Note - Notification   Nurse Notified?   (staff in room)

## 2023-03-25 PROBLEM — B00.9 HSV INFECTION: Status: ACTIVE | Noted: 2021-07-01

## 2023-03-25 LAB — VANCOMYCIN TROUGH SERPL-MCNC: 24.7 UG/ML (ref 10–22)

## 2023-03-25 PROCEDURE — C1751 CATH, INF, PER/CENT/MIDLINE: HCPCS | Performed by: ANESTHESIOLOGY

## 2023-03-25 PROCEDURE — 25000003 PHARM REV CODE 250: Performed by: STUDENT IN AN ORGANIZED HEALTH CARE EDUCATION/TRAINING PROGRAM

## 2023-03-25 PROCEDURE — 36415 COLL VENOUS BLD VENIPUNCTURE: CPT | Performed by: OBSTETRICS & GYNECOLOGY

## 2023-03-25 PROCEDURE — 72200005 HC VAGINAL DELIVERY LEVEL II

## 2023-03-25 PROCEDURE — 59409 PR OBSTETRICAL CARE,VAG DELIV ONLY: ICD-10-PCS | Mod: AT,,, | Performed by: OBSTETRICS & GYNECOLOGY

## 2023-03-25 PROCEDURE — 59409 OBSTETRICAL CARE: CPT | Mod: AT,,, | Performed by: OBSTETRICS & GYNECOLOGY

## 2023-03-25 PROCEDURE — 72100003 HC LABOR CARE, EA. ADDL. 8 HRS

## 2023-03-25 PROCEDURE — 11000001 HC ACUTE MED/SURG PRIVATE ROOM

## 2023-03-25 PROCEDURE — 63600175 PHARM REV CODE 636 W HCPCS: Performed by: OBSTETRICS & GYNECOLOGY

## 2023-03-25 PROCEDURE — 59409 PRA ETRICAL CARE,VAG DELIV ONLY: ICD-10-PCS | Mod: AA,,, | Performed by: ANESTHESIOLOGY

## 2023-03-25 PROCEDURE — 80202 ASSAY OF VANCOMYCIN: CPT | Performed by: OBSTETRICS & GYNECOLOGY

## 2023-03-25 PROCEDURE — 62326 NJX INTERLAMINAR LMBR/SAC: CPT | Performed by: STUDENT IN AN ORGANIZED HEALTH CARE EDUCATION/TRAINING PROGRAM

## 2023-03-25 PROCEDURE — 63600175 PHARM REV CODE 636 W HCPCS: Performed by: STUDENT IN AN ORGANIZED HEALTH CARE EDUCATION/TRAINING PROGRAM

## 2023-03-25 PROCEDURE — 25000003 PHARM REV CODE 250: Performed by: OBSTETRICS & GYNECOLOGY

## 2023-03-25 PROCEDURE — 51702 INSERT TEMP BLADDER CATH: CPT

## 2023-03-25 PROCEDURE — 27000181 HC CABLE, IUPC

## 2023-03-25 PROCEDURE — 72100002 HC LABOR CARE, 1ST 8 HOURS

## 2023-03-25 PROCEDURE — 59409 OBSTETRICAL CARE: CPT | Mod: AA,,, | Performed by: ANESTHESIOLOGY

## 2023-03-25 PROCEDURE — 27200710 HC EPIDURAL INFUSION PUMP SET: Performed by: ANESTHESIOLOGY

## 2023-03-25 RX ORDER — FENTANYL/BUPIVACAINE/NS/PF 2MCG/ML-.1
PLASTIC BAG, INJECTION (ML) INJECTION
Status: COMPLETED
Start: 2023-03-25 | End: 2023-03-25

## 2023-03-25 RX ORDER — FENTANYL/BUPIVACAINE/NS/PF 2MCG/ML-.1
PLASTIC BAG, INJECTION (ML) INJECTION
Status: DISCONTINUED | OUTPATIENT
Start: 2023-03-25 | End: 2023-03-25

## 2023-03-25 RX ORDER — SODIUM CITRATE AND CITRIC ACID MONOHYDRATE 334; 500 MG/5ML; MG/5ML
30 SOLUTION ORAL ONCE
Status: CANCELLED | OUTPATIENT
Start: 2023-03-25 | End: 2023-03-25

## 2023-03-25 RX ORDER — FENTANYL CITRATE 50 UG/ML
INJECTION, SOLUTION INTRAMUSCULAR; INTRAVENOUS
Status: COMPLETED
Start: 2023-03-25 | End: 2023-03-25

## 2023-03-25 RX ORDER — FAMOTIDINE 10 MG/ML
20 INJECTION INTRAVENOUS ONCE
Status: CANCELLED | OUTPATIENT
Start: 2023-03-25 | End: 2023-03-25

## 2023-03-25 RX ORDER — FENTANYL CITRATE 50 UG/ML
INJECTION, SOLUTION INTRAMUSCULAR; INTRAVENOUS
Status: DISCONTINUED | OUTPATIENT
Start: 2023-03-25 | End: 2023-03-25

## 2023-03-25 RX ORDER — ENOXAPARIN SODIUM 100 MG/ML
40 INJECTION SUBCUTANEOUS EVERY 12 HOURS
Status: DISCONTINUED | OUTPATIENT
Start: 2023-03-26 | End: 2023-03-27 | Stop reason: HOSPADM

## 2023-03-25 RX ORDER — LIDOCAINE HYDROCHLORIDE AND EPINEPHRINE 15; 5 MG/ML; UG/ML
INJECTION, SOLUTION EPIDURAL
Status: DISCONTINUED | OUTPATIENT
Start: 2023-03-25 | End: 2023-03-25

## 2023-03-25 RX ADMIN — FENTANYL CITRATE 100 MCG: 0.05 INJECTION, SOLUTION INTRAMUSCULAR; INTRAVENOUS at 09:03

## 2023-03-25 RX ADMIN — Medication 2 MILLI-UNITS/MIN: at 01:03

## 2023-03-25 RX ADMIN — VANCOMYCIN HYDROCHLORIDE 2000 MG: 500 INJECTION, POWDER, LYOPHILIZED, FOR SOLUTION INTRAVENOUS at 11:03

## 2023-03-25 RX ADMIN — Medication 10.4 ML: at 09:03

## 2023-03-25 RX ADMIN — VANCOMYCIN HYDROCHLORIDE 2000 MG: 500 INJECTION, POWDER, LYOPHILIZED, FOR SOLUTION INTRAVENOUS at 08:03

## 2023-03-25 RX ADMIN — Medication 334 MILLI-UNITS/MIN: at 09:03

## 2023-03-25 RX ADMIN — LIDOCAINE HYDROCHLORIDE,EPINEPHRINE BITARTRATE 4 ML: 15; .005 INJECTION, SOLUTION EPIDURAL; INFILTRATION; INTRACAUDAL; PERINEURAL at 09:03

## 2023-03-25 NOTE — PROGRESS NOTES
LABOR NOTE    Resident to bedside for routine cervical check.    S:  Complaints: No.  Epidural working:  not applicable      O: BP (!) 122/59   Pulse 85   Temp 98.2 °F (36.8 °C) (Oral)   Resp 20   Wt (!) 155.4 kg (342 lb 9.5 oz)   LMP 2022 (Exact Date)   SpO2 97%   Breastfeeding No   BMI 62.65 kg/m²       FHT: Cat 1 (reassuring), baseline 140, mod renato, + accels, - decels  CTX: difficulty to detect using wireless monitors  SVE: 70/-3, mortensen out, pit started    TIMELINE:  1500: 0.5/50/-3, mortensen placed, pit started  1745: pit stopped  1830: 1cm, mortensen firmly in place, vaginal cytotec placed  2300: 4/70/-3, mortensen out, pit started    A/P: 22 y.o.  at 36w6d, admitted for IOL 2/2 non-reactive NST    Labor management:  - Continue Close Maternal/Fetal Monitoring  - Pitocin augmentation per protocol  - Recheck 4 hours or PRN      Ladonna Mcgovern MD   OB/GYN PGY1  [facility]

## 2023-03-25 NOTE — PROGRESS NOTES
LABOR NOTE    Resident to bedside for routine cervical check.    S:  Complaints: No.  Epidural working:  yes      O: BP (!) 111/55   Pulse 77   Temp 97.9 °F (36.6 °C) (Oral)   Resp 20   Wt (!) 155.4 kg (342 lb 9.5 oz)   LMP 2022 (Exact Date)   SpO2 96%   Breastfeeding No   BMI 62.65 kg/m²       FHT: 135, mod BTBV, +accels, intermittent late decels, improved with maternal repositioning (overall reassuring)  CTX: q4 mins, pit @6  SVE: /0      TIMELINE:  1500: 0.5/50/-3, mortensen placed, pit started  1745: pit stopped  1830: 1cm, mortensen firmly in place, vaginal cytotec placed  2300: /-3, mortensen out, pit started  0500: /-3, pit at 10  0815: /-2, pit off, AROM clr, FSE placed  1200: /-2, pit @4, IUPC placed  1600: /-1, pit @4  1700: /0, pit @6    A/P: 22 y.o.  at 36w6d, admitted for IOL 2/2 non-reactive NST    Labor management:  - On chart review, patient was seen in the ED in  for vulvar lesions concerning for HSV and discharged on acyclovir. No swabs were taken. Patient has never been tested for HSV. She was not on prophylactic Valtrex in this pregnancy. Denies any current symptoms of an HSV outbreak. Speculum exam negative for any lesions.   - Continue Close Maternal/Fetal Monitoring  - Pitocin augmentation per protocol  - Recheck 1-2 hours or PRN      Mary Beth Leroy MD  [facility]   OBGYN PGY1

## 2023-03-25 NOTE — PROGRESS NOTES
LABOR NOTE    Resident to bedside for routine cervical check.    S:  Complaints: No.  Epidural working:  not applicable      O: /77   Pulse 69   Temp 97.9 °F (36.6 °C) (Oral)   Resp 20   Wt (!) 155.4 kg (342 lb 9.5 oz)   LMP 2022 (Exact Date)   SpO2 97%   Breastfeeding No   BMI 62.65 kg/m²       FHT: Cat 1 (reassuring), baseline 140, mod renato, + accels, - decels  CTX: q2-3min  SVE: 4/70/-3, pit at 10      TIMELINE:  1500: 0.5/50/-3, mortensen placed, pit started  1745: pit stopped  1830: 1cm, mortensen firmly in place, vaginal cytotec placed  2300: 470/-3, mortensen out, pit started  0500: 470/-3, pit at 10    A/P: 22 y.o.  at 36w6d, admitted for IOL 2/2 non-reactive NST    Labor management:  - Continue Close Maternal/Fetal Monitoring  - Pitocin augmentation per protocol  - Discussed option for AROM, patient would like to wait until next check  - Recheck 4 hours or PRN      Ladonna Mcgovern MD   OB/GYN PGY1  [facility]

## 2023-03-25 NOTE — PROGRESS NOTES
LABOR NOTE    Resident to bedside for routine cervical check.    S:  Complaints: No.  Epidural working:  yes      O: BP (!) 111/55   Pulse 77   Temp 97.9 °F (36.6 °C) (Oral)   Resp 20   Wt (!) 155.4 kg (342 lb 9.5 oz)   LMP 2022 (Exact Date)   SpO2 96%   Breastfeeding No   BMI 62.65 kg/m²       FHT: 130, mod BTBV, +accels, - decels  CTX: q3-4 mins, pit @4  SVE: 790/-1, pit @ 4      TIMELINE:  1500: 0.5/50/-3, mortensen placed, pit started  1745: pit stopped  1830: 1cm, mortensen firmly in place, vaginal cytotec placed  2300: 470/-3, mortensen out, pit started  0500: 70/-3, pit at 10  0815: 5/80/-2, pit off, AROM clr, FSE placed  1200: 5/-2, pit @4, IUPC placed  1600: 7/-1, pit @4    A/P: 22 y.o.  at 36w6d, admitted for IOL 2/2 non-reactive NST    Labor management:  - On chart review, patient was seen in the ED in  for vulvar lesions concerning for HSV and discharged on acyclovir. No swabs were taken. Patient has never been tested for HSV. She was not on prophylactic Valtrex in this pregnancy. Denies any current symptoms of an HSV outbreak. Speculum exam negative for any lesions.   - Continue Close Maternal/Fetal Monitoring  - Pitocin augmentation per protocol  - Recheck 2 hours or PRN      Mary Beth Leroy MD  [facility]   OBGYN PGY1

## 2023-03-25 NOTE — PROGRESS NOTES
LABOR NOTE    Resident to bedside for routine cervical check.    S:  Complaints: No.  Epidural working:  yes      O: BP (!) 111/59   Pulse 76   Temp 97.9 °F (36.6 °C) (Oral)   Resp 20   Wt (!) 155.4 kg (342 lb 9.5 oz)   LMP 2022 (Exact Date)   SpO2 96%   Breastfeeding No   BMI 62.65 kg/m²       FHT: 140, mod BTBV, +accels, intermittent decels   CTX: not picking up on monitor  SVE: /-2, pit @ 4, IUPC placed      TIMELINE:  1500: 0.5/50/-3, mortensen placed, pit started  1745: pit stopped  1830: 1cm, mortensen firmly in place, vaginal cytotec placed  2300: 70/-3, mortensen out, pit started  0500: 70/-3, pit at 10  0815: 5/80/-2, pit off, AROM clr, FSE placed  1200: /-2, pit @4, IUPC placed    A/P: 22 y.o.  at 36w6d, admitted for IOL 2/2 non-reactive NST    Labor management:  - FHT with intermittent decels, unable to tell if early or lates due to external toco not picking up contractions. IUPC placed. Following cervical exam FHT with prolonged decel down to the 70s. Pitocin paused. Maternal repositioning to left side with return of FHT to baseline. Will restart pitocin at half in 30 mins if FHT reassuring.   - Continue Close Maternal/Fetal Monitoring  - Pitocin augmentation per protocol  - Recheck 4 hours or PRN      Mary Beth Leroy MD  [facility]   OBGYN PGY1

## 2023-03-25 NOTE — PROGRESS NOTES
LABOR NOTE    Resident to bedside for routine cervical check.    S:  Complaints: No.  Epidural working:  not applicable      O: /69   Pulse 69   Temp 97.9 °F (36.6 °C) (Oral)   Resp 20   Wt (!) 155.4 kg (342 lb 9.5 oz)   LMP 2022 (Exact Date)   SpO2 97%   Breastfeeding No   BMI 62.65 kg/m²       FHT: heart tones unable to be picked up on monitor  CTX: not picking up on monitor  SVE: 80/-2, pit off, AROM clr, FSE placed      TIMELINE:  1500: 0.5/50/-3, mortensen placed, pit started  1745: pit stopped  1830: 1cm, mortensen firmly in place, vaginal cytotec placed  2300: 470/-3, mortensen out, pit started  0500: 70/-3, pit at 10  0815: 80/-2, pit off, AROM clr, FSE placed    A/P: 22 y.o.  at 36w6d, admitted for IOL 2/2 non-reactive NST    Labor management:  - Difficult to monitor heart tones with [place] monitor. Also difficult with external US monitor when patient moves around in bed. Heart tones unable to be monitored for >20 mins so pitocin was turned off. Discussed at length with patient the importance of monitoring heart tones especially since she was being induced for a category 2 tracing. Patient amendable to AROM and internal monitoring. Heart tones 150bpm after FSE placed, no decels, no accels, mod BTBV, Cat 1.  - Will turned pitocin back on now that we can monitor heart tones  - Continue Close Maternal/Fetal Monitoring  - Pitocin augmentation per protocol  - Recheck 4 hours or PRN        Mary Beth Leroy MD  [facility]   OBGYN PGY1

## 2023-03-25 NOTE — ANESTHESIA PROCEDURE NOTES
Epidural    Patient location during procedure: OB   Reason for block: primary anesthetic   Reason for block: labor analgesia requested by patient and obstetrician  Diagnosis: IUP   Start time: 3/25/2023 9:28 AM  Timeout: 3/25/2023 9:27 AM  End time: 3/25/2023 9:45 AM  Surgery related to: Vaginal Delivery    Staffing  Performing Provider: Baldemar Gonzales MD  Authorizing Provider: Beth Musa MD        Preanesthetic Checklist  Completed: patient identified, IV checked, site marked, risks and benefits discussed, surgical consent, monitors and equipment checked, pre-op evaluation, timeout performed, anesthesia consent given, hand hygiene performed and patient being monitored  Preparation  Patient position: sitting  Prep: ChloraPrep  Patient monitoring: Pulse Ox  Reason for block: primary anesthetic   Epidural  Skin Anesthetic: lidocaine 1%  Skin Wheal: 3 mL  Administration type: continuous  Approach: midline  Interspace: L3-4    Injection technique: MARIANO saline  Needle and Epidural Catheter  Needle type: Tuohy   Needle gauge: 17  Needle length: 5.0 inches  Needle insertion depth: 12 cm  Catheter type: [employer]  Catheter size: 19 G  Catheter at skin depth: 17 cm  Insertion Attempts: 1  Test dose: 3 mL of lidocaine 1.5% with Epi 1-to-200,000  Additional Documentation: incremental injection, negative aspiration for heme and CSF, no paresthesia on injection, no signs/symptoms of IV or SA injection, no significant pain on injection and no significant complaints from patient  Needle localization: anatomical landmarks  Medications:  Volume per aspiration: 5 mL  Time between aspirations: 5 minutes   Assessment  Ease of block: easy  Patient's tolerance of the procedure: comfortable throughout block and no complaints No inadvertent dural puncture with Tuohy.  Dural puncture not performed with spinal needle

## 2023-03-25 NOTE — PROGRESS NOTES
03/25/23 0021   TeleStork Alicia Note - Strip   Strip Reviewed by [place] Nurse? Yes   TeleStork [place] Note - Communication   [place] Nurse Communicated with Bedside Nurse Regarding: Other (See Note);Contraction Pattern  ([place])   TeleStork [place] Note - Notification   Nurse Notified? Yes   Name of Nurse Rosamaria

## 2023-03-26 PROBLEM — O28.8 NON-REACTIVE NST (NON-STRESS TEST): Status: RESOLVED | Noted: 2023-03-24 | Resolved: 2023-03-26

## 2023-03-26 LAB
BASOPHILS # BLD AUTO: 0.02 K/UL (ref 0–0.2)
BASOPHILS NFR BLD: 0.2 % (ref 0–1.9)
DIFFERENTIAL METHOD: ABNORMAL
EOSINOPHIL # BLD AUTO: 0 K/UL (ref 0–0.5)
EOSINOPHIL NFR BLD: 0 % (ref 0–8)
ERYTHROCYTE [DISTWIDTH] IN BLOOD BY AUTOMATED COUNT: 13.1 % (ref 11.5–14.5)
HCT VFR BLD AUTO: 30.5 % (ref 37–48.5)
HGB BLD-MCNC: 10.1 G/DL (ref 12–16)
IMM GRANULOCYTES # BLD AUTO: 0.04 K/UL (ref 0–0.04)
IMM GRANULOCYTES NFR BLD AUTO: 0.3 % (ref 0–0.5)
LYMPHOCYTES # BLD AUTO: 2.1 K/UL (ref 1–4.8)
LYMPHOCYTES NFR BLD: 16.8 % (ref 18–48)
MCH RBC QN AUTO: 27.3 PG (ref 27–31)
MCHC RBC AUTO-ENTMCNC: 33.1 G/DL (ref 32–36)
MCV RBC AUTO: 82 FL (ref 82–98)
MONOCYTES # BLD AUTO: 1.3 K/UL (ref 0.3–1)
MONOCYTES NFR BLD: 10.4 % (ref 4–15)
NEUTROPHILS # BLD AUTO: 9.2 K/UL (ref 1.8–7.7)
NEUTROPHILS NFR BLD: 72.3 % (ref 38–73)
NRBC BLD-RTO: 0 /100 WBC
PLATELET # BLD AUTO: 246 K/UL (ref 150–450)
PMV BLD AUTO: 10.7 FL (ref 9.2–12.9)
RBC # BLD AUTO: 3.7 M/UL (ref 4–5.4)
WBC # BLD AUTO: 12.66 K/UL (ref 3.9–12.7)

## 2023-03-26 PROCEDURE — 36415 COLL VENOUS BLD VENIPUNCTURE: CPT | Performed by: STUDENT IN AN ORGANIZED HEALTH CARE EDUCATION/TRAINING PROGRAM

## 2023-03-26 PROCEDURE — 99232 PR SUBSEQUENT HOSPITAL CARE,LEVL II: ICD-10-PCS | Mod: ICN,,, | Performed by: OBSTETRICS & GYNECOLOGY

## 2023-03-26 PROCEDURE — 11000001 HC ACUTE MED/SURG PRIVATE ROOM

## 2023-03-26 PROCEDURE — 85025 COMPLETE CBC W/AUTO DIFF WBC: CPT | Performed by: STUDENT IN AN ORGANIZED HEALTH CARE EDUCATION/TRAINING PROGRAM

## 2023-03-26 PROCEDURE — 99232 SBSQ HOSP IP/OBS MODERATE 35: CPT | Mod: ICN,,, | Performed by: OBSTETRICS & GYNECOLOGY

## 2023-03-26 PROCEDURE — 25000003 PHARM REV CODE 250: Performed by: STUDENT IN AN ORGANIZED HEALTH CARE EDUCATION/TRAINING PROGRAM

## 2023-03-26 PROCEDURE — 72100003 HC LABOR CARE, EA. ADDL. 8 HRS

## 2023-03-26 PROCEDURE — 63600175 PHARM REV CODE 636 W HCPCS: Performed by: STUDENT IN AN ORGANIZED HEALTH CARE EDUCATION/TRAINING PROGRAM

## 2023-03-26 RX ORDER — SIMETHICONE 80 MG
1 TABLET,CHEWABLE ORAL EVERY 6 HOURS PRN
Status: DISCONTINUED | OUTPATIENT
Start: 2023-03-26 | End: 2023-03-27 | Stop reason: HOSPADM

## 2023-03-26 RX ORDER — IBUPROFEN 600 MG/1
600 TABLET ORAL EVERY 6 HOURS
Status: DISCONTINUED | OUTPATIENT
Start: 2023-03-26 | End: 2023-03-27 | Stop reason: HOSPADM

## 2023-03-26 RX ORDER — METHYLERGONOVINE MALEATE 0.2 MG/ML
200 INJECTION INTRAVENOUS
Status: DISCONTINUED | OUTPATIENT
Start: 2023-03-26 | End: 2023-03-27 | Stop reason: HOSPADM

## 2023-03-26 RX ORDER — OXYTOCIN 10 [USP'U]/ML
10 INJECTION, SOLUTION INTRAMUSCULAR; INTRAVENOUS ONCE AS NEEDED
Status: DISCONTINUED | OUTPATIENT
Start: 2023-03-26 | End: 2023-03-27 | Stop reason: HOSPADM

## 2023-03-26 RX ORDER — CARBOPROST TROMETHAMINE 250 UG/ML
250 INJECTION, SOLUTION INTRAMUSCULAR
Status: DISCONTINUED | OUTPATIENT
Start: 2023-03-26 | End: 2023-03-27 | Stop reason: HOSPADM

## 2023-03-26 RX ORDER — HYDROCODONE BITARTRATE AND ACETAMINOPHEN 5; 325 MG/1; MG/1
1 TABLET ORAL EVERY 4 HOURS PRN
Status: DISCONTINUED | OUTPATIENT
Start: 2023-03-26 | End: 2023-03-27 | Stop reason: HOSPADM

## 2023-03-26 RX ORDER — HYDROCODONE BITARTRATE AND ACETAMINOPHEN 10; 325 MG/1; MG/1
1 TABLET ORAL EVERY 4 HOURS PRN
Status: DISCONTINUED | OUTPATIENT
Start: 2023-03-26 | End: 2023-03-27 | Stop reason: HOSPADM

## 2023-03-26 RX ORDER — ONDANSETRON 8 MG/1
8 TABLET, ORALLY DISINTEGRATING ORAL EVERY 8 HOURS PRN
Status: DISCONTINUED | OUTPATIENT
Start: 2023-03-26 | End: 2023-03-27 | Stop reason: HOSPADM

## 2023-03-26 RX ORDER — IBUPROFEN 600 MG/1
600 TABLET ORAL EVERY 6 HOURS PRN
Qty: 30 TABLET | Refills: 1 | Status: SHIPPED | OUTPATIENT
Start: 2023-03-26 | End: 2023-03-27 | Stop reason: HOSPADM

## 2023-03-26 RX ORDER — SODIUM CHLORIDE 0.9 % (FLUSH) 0.9 %
10 SYRINGE (ML) INJECTION
Status: DISCONTINUED | OUTPATIENT
Start: 2023-03-26 | End: 2023-03-27 | Stop reason: HOSPADM

## 2023-03-26 RX ORDER — PROCHLORPERAZINE EDISYLATE 5 MG/ML
5 INJECTION INTRAMUSCULAR; INTRAVENOUS EVERY 6 HOURS PRN
Status: DISCONTINUED | OUTPATIENT
Start: 2023-03-26 | End: 2023-03-27 | Stop reason: HOSPADM

## 2023-03-26 RX ORDER — TRANEXAMIC ACID 10 MG/ML
1000 INJECTION, SOLUTION INTRAVENOUS ONCE AS NEEDED
Status: DISCONTINUED | OUTPATIENT
Start: 2023-03-26 | End: 2023-03-27 | Stop reason: HOSPADM

## 2023-03-26 RX ORDER — OXYTOCIN/RINGER'S LACTATE 30/500 ML
95 PLASTIC BAG, INJECTION (ML) INTRAVENOUS ONCE
Status: DISCONTINUED | OUTPATIENT
Start: 2023-03-26 | End: 2023-03-27 | Stop reason: HOSPADM

## 2023-03-26 RX ORDER — PRENATAL WITH FERROUS FUM AND FOLIC ACID 3080; 920; 120; 400; 22; 1.84; 3; 20; 10; 1; 12; 200; 27; 25; 2 [IU]/1; [IU]/1; MG/1; [IU]/1; MG/1; MG/1; MG/1; MG/1; MG/1; MG/1; UG/1; MG/1; MG/1; MG/1; MG/1
1 TABLET ORAL DAILY
Status: DISCONTINUED | OUTPATIENT
Start: 2023-03-26 | End: 2023-03-27 | Stop reason: HOSPADM

## 2023-03-26 RX ORDER — HYDROCORTISONE 25 MG/G
CREAM TOPICAL 3 TIMES DAILY PRN
Status: DISCONTINUED | OUTPATIENT
Start: 2023-03-26 | End: 2023-03-27 | Stop reason: HOSPADM

## 2023-03-26 RX ORDER — DIPHENHYDRAMINE HYDROCHLORIDE 50 MG/ML
25 INJECTION INTRAMUSCULAR; INTRAVENOUS EVERY 4 HOURS PRN
Status: DISCONTINUED | OUTPATIENT
Start: 2023-03-26 | End: 2023-03-27 | Stop reason: HOSPADM

## 2023-03-26 RX ORDER — OXYTOCIN/RINGER'S LACTATE 30/500 ML
95 PLASTIC BAG, INJECTION (ML) INTRAVENOUS ONCE AS NEEDED
Status: DISCONTINUED | OUTPATIENT
Start: 2023-03-26 | End: 2023-03-27 | Stop reason: HOSPADM

## 2023-03-26 RX ORDER — ACETAMINOPHEN 325 MG/1
650 TABLET ORAL EVERY 6 HOURS PRN
Status: DISCONTINUED | OUTPATIENT
Start: 2023-03-26 | End: 2023-03-27 | Stop reason: HOSPADM

## 2023-03-26 RX ORDER — MISOPROSTOL 200 UG/1
800 TABLET ORAL ONCE AS NEEDED
Status: DISCONTINUED | OUTPATIENT
Start: 2023-03-26 | End: 2023-03-27 | Stop reason: HOSPADM

## 2023-03-26 RX ORDER — DOCUSATE SODIUM 100 MG/1
200 CAPSULE, LIQUID FILLED ORAL 2 TIMES DAILY PRN
Status: DISCONTINUED | OUTPATIENT
Start: 2023-03-26 | End: 2023-03-27 | Stop reason: HOSPADM

## 2023-03-26 RX ORDER — DIPHENHYDRAMINE HCL 25 MG
25 CAPSULE ORAL EVERY 4 HOURS PRN
Status: DISCONTINUED | OUTPATIENT
Start: 2023-03-26 | End: 2023-03-27 | Stop reason: HOSPADM

## 2023-03-26 RX ORDER — OXYTOCIN/RINGER'S LACTATE 30/500 ML
334 PLASTIC BAG, INJECTION (ML) INTRAVENOUS ONCE AS NEEDED
Status: DISCONTINUED | OUTPATIENT
Start: 2023-03-26 | End: 2023-03-27 | Stop reason: HOSPADM

## 2023-03-26 RX ADMIN — IBUPROFEN 600 MG: 600 TABLET, FILM COATED ORAL at 01:03

## 2023-03-26 RX ADMIN — DOCUSATE SODIUM 200 MG: 100 CAPSULE, LIQUID FILLED ORAL at 07:03

## 2023-03-26 RX ADMIN — ENOXAPARIN SODIUM 40 MG: 40 INJECTION SUBCUTANEOUS at 08:03

## 2023-03-26 RX ADMIN — HYDROCODONE BITARTRATE AND ACETAMINOPHEN 1 TABLET: 10; 325 TABLET ORAL at 05:03

## 2023-03-26 RX ADMIN — HYDROCODONE BITARTRATE AND ACETAMINOPHEN 1 TABLET: 10; 325 TABLET ORAL at 07:03

## 2023-03-26 RX ADMIN — ENOXAPARIN SODIUM 40 MG: 40 INJECTION SUBCUTANEOUS at 07:03

## 2023-03-26 RX ADMIN — PRENATAL VIT W/ FE FUMARATE-FA TAB 27-0.8 MG 1 TABLET: 27-0.8 TAB at 08:03

## 2023-03-26 RX ADMIN — DOCUSATE SODIUM 200 MG: 100 CAPSULE, LIQUID FILLED ORAL at 08:03

## 2023-03-26 RX ADMIN — IBUPROFEN 600 MG: 600 TABLET, FILM COATED ORAL at 08:03

## 2023-03-26 RX ADMIN — IBUPROFEN 600 MG: 600 TABLET, FILM COATED ORAL at 07:03

## 2023-03-26 NOTE — L&D DELIVERY NOTE
[facility] - Labor & Delivery  Vaginal Delivery   Operative Note    SUMMARY     Normal spontaneous vaginal delivery of live infant, was placed on mothers abdomen for skin to skin and bulb suctioning performed.  Infant delivered position OA over intact perineum.  Nuchal cord: No.    Spontaneous delivery of placenta and IV pitocin given noting good uterine tone. Patient decided she no longer desires post-placental IUD, thus it was not placed.   Midline vaginal laceration noted and repaired with 2-0 Vicryl .  Patient tolerated delivery well. Sponge needle and lap counted correctly x2.      Mary Beth Leroy MD  [facility]   OBGYN PGY1      Indications:  (spontaneous vaginal delivery)  Pregnancy complicated by:   Patient Active Problem List   Diagnosis    Depression    Bipolar 1 disorder    Abnormal weight gain    Acanthosis nigricans    Suspected sleep apnea    Sleep apnea in adult    Screen for STD (sexually transmitted disease)    Pre-conception counseling    Pregnancy    Eczema    Initial obstetric visit, second trimester    Morbid obesity    Chronic hypertension    Supervision of high-risk pregnancy, third trimester    30 weeks gestation of pregnancy    Encounter for fetal screening for congenital cardiac abnormalities    Obesity affecting pregnancy in third trimester, antepartum    History of chronic hypertension    Non-reactive NST (non-stress test)    HSV infection     (spontaneous vaginal delivery)     Admitting GA: 37w0d    Delivery Information for Argenis Dangelo    Birth information:  YOB: 2021   Time of birth: 9:19 PM   Sex: female   Head Delivery Date/Time: 3/25/2023  9:19 PM   Delivery type: Vaginal, Spontaneous   Gestational Age: 37w0d  Unknown    Delivery Providers    Delivering clinician: Ladonna Tsai MD   Provider Role    CAROL Singh RN Alyssa Segretto Soto, RN Valeria Mantilla, MD Sara W Moore, RN        "        Measurements    Weight: 2780 g  Weight (lbs): 6 lb 2.1 oz  Length: 49.5 cm  Length (in): 19.5"  Head circumference: 33 cm  Chest circumference: 30.5 cm         Apgars    Living status: Living  Apgars:  1 min.:  5 min.:  10 min.:  15 min.:  20 min.:    Skin color:  1  1       Heart rate:  2  2       Reflex irritability:  2  2       Muscle tone:  2  2       Respiratory effort:  2  2       Total:  9  9       Apgars assigned by: DYLAN NAZARIO         Operative Delivery    Forceps attempted?: No  Vacuum extractor attempted?: No         Shoulder Dystocia    Shoulder dystocia present?: No           Presentation    Presentation: Vertex  Position: Occiput Anterior           Interventions/Resuscitation    Method: Bulb Suctioning, Tactile Stimulation       Cord    Vessels: 3 vessels  Complications: None  Delayed Cord Clamping?: Yes  Cord Clamped Date/Time: 3/25/2023  9:20 PM  Cord Blood Disposition: Sent with Baby  Gases Sent?: No  Stem Cell Collection (by MD): No       Placenta    Placenta delivery date/time: 3/25/2023 2127  Placenta removal: Manual removal  Placenta appearance: Intact  Placenta disposition: Discarded           Labor Events:       labor: No     Labor Onset Date/Time:         Dilation Complete Date/Time: 2023 19:18     Start Pushing Date/Time: 2023 20:08       Start Pushing Date/Time: 2023 20:08     Rupture Date/Time: 23  0808         Rupture type: ARM (Artificial Rupture)           Fluid Amount:         Fluid Color: Clear                 steroids: None     Antibiotics given for GBS: Yes     Induction: balloon dilation (Correa);misoprostol     Indications for induction:  Fetal Heart Rate or Rhythm Abnormality     Augmentation: amniotomy;oxytocin     Indications for augmentation: Ineffective Contraction Pattern     Labor complications: None     Additional complications:          Cervical ripening:                     Delivery:      Episiotomy: None     Indication for " Episiotomy:       Perineal Lacerations: None Repaired:      Periurethral Laceration:   Repaired:     Labial Laceration:   Repaired:     Sulcus Laceration:   Repaired:     Vaginal Laceration: Yes Repaired: Yes   Cervical Laceration:   Repaired:     Repair suture:       Repair # of packets: 1     Last Value - EBL - Nursing (mL):       Sum - EBL - Nursing (mL): 0     Last Value - EBL - Anesthesia (mL):        Calculated QBL (mL): 255        Vaginal Sweep Performed: Yes     Surgicount Correct: Yes     Vaginal Packing: No Quantity:       Other providers:       Anesthesia    Method: Epidural          Details (if applicable):  Trial of Labor      Categorization:      Priority:     Indications for :     Incision Type:       Additional  information:  Forceps:    Vacuum:    Breech:    Observed anomalies    Other (Comments):

## 2023-03-26 NOTE — PLAN OF CARE
Problem:  Fall Injury Risk  Goal: Absence of Fall, Infant Drop and Related Injury  Outcome: Ongoing, Progressing     Problem: Adult Inpatient Plan of Care  Goal: Plan of Care Review  Outcome: Ongoing, Progressing     Problem: Bariatric Environmental Safety  Goal: Safety Maintained with Care  Outcome: Ongoing, Progressing     Problem: Pain Acute  Goal: Acceptable Pain Control and Functional Ability  Outcome: Ongoing, Progressing     Problem: Breastfeeding  Goal: Effective Breastfeeding  Outcome: Ongoing, Progressing     Problem: Adjustment to Role Transition (Postpartum Vaginal Delivery)  Goal: Successful Maternal Role Transition  Outcome: Ongoing, Progressing     Patient in no apparent distress. VSS. Ambulating and voiding without difficulty. Hand expressing colostrum for baby. No acute events this shift. No additional needs at this time.

## 2023-03-26 NOTE — NURSING
Md notified of pt's inability to pee, light bleeding, firm and midline uterus, and pt's refusal to in and out cath at four hours after mortensen was removed. MB rn and MB charge notified of the situation as well. Md ordered to allow the patient to wait another hour and a half before she will medically require an in and out cath. Will relay order to mother baby RN. Pt safety maintained.

## 2023-03-26 NOTE — CARE UPDATE
Resident to bedside for cervical exam. Patient complete and 0 station. FHT with intermittent decels which improve with repositioning. Will set up to push. Anticipate . Ob staff notified.       Mary Beth Leroy MD  [facility]   OBGYN PGY1

## 2023-03-26 NOTE — PROGRESS NOTES
POSTPARTUM PROGRESS NOTE    Subjective:     PPD/POD#: 1   Procedure:    EGA: 37w0d   N/V: No   F/C: No   Abd Pain: Mild, well-controlled with oral pain medication   Lochia: Mild   Voiding: Yes   Ambulating: Yes   Bowel fnc: No   Breastfeeding: Yes   Contraception: Need to discuss prior to discharge. Declined ppIUD   Circumcision: N/A, female     Objective:      Temp:  [97 °F (36.1 °C)-98.6 °F (37 °C)] 98.6 °F (37 °C)  Pulse:  [] 88  Resp:  [15-18] 18  SpO2:  [92 %-100 %] 96 %  BP: ()/(52-82) 99/52    Lung: Normal respiratory effort   Abdomen: Soft, appropriately tender   Uterus: Firm, no fundal tenderness   Incision: N/A   : Deferred   Extremities: Bilateral trace edema     Lab Review    Recent Labs   Lab 23  1349      K 3.4*      CO2 22*   BUN 9   CREATININE 0.6   GLU 84   PROT 6.3   BILITOT 0.6   ALKPHOS 123   ALT 43   AST 27       Recent Labs   Lab 23  1349   WBC 7.07   HGB 10.7*   HCT 32.4*   MCV 82            I/O    Intake/Output Summary (Last 24 hours) at 3/26/2023 0640  Last data filed at 3/26/2023 0100  Gross per 24 hour   Intake 3186.37 ml   Output 1405 ml   Net 1781.37 ml        Assessment and Plan:   Postpartum care:  - Patient doing well.  - Continue routine management and advances.    cHTN  - BP as above  - asymptomatic  - preE labs as above  - Hypertensive agent not indicated at this time    Obesity  - PrePreg BMI 62  - Encourage ambulation  - Lovenox: 40 mg BID    HSV  - Not complaining of symptoms  - Negative speculum exam for lesions        Mary Beth Leroy MD  [facility]   OBGYN PGY1

## 2023-03-26 NOTE — PLAN OF CARE
Plan of care reviewed with pt, verbalizes understanding. VSS. Voiding and ambulating without difficulty. Pain well controlled. Fundus firm without massage. Bed low and locked, call bell and personal items within reach. Will continue to monitor.

## 2023-03-26 NOTE — NURSING
Upon morning rounds,  found sleeping on top of pillow at foot of bed. Reinforced teaching on safe sleep and co-sleeping. [place] placed in crib. Verbalizes understanding. Will continue to monitor.

## 2023-03-26 NOTE — PLAN OF CARE
Problem:  Fall Injury Risk  Goal: Absence of Fall, Infant Drop and Related Injury  Outcome: Ongoing, Progressing     Problem: Adult Inpatient Plan of Care  Goal: Plan of Care Review  Outcome: Ongoing, Progressing  Goal: Patient-Specific Goal (Individualized)  Outcome: Ongoing, Progressing  Goal: Absence of Hospital-Acquired Illness or Injury  Outcome: Ongoing, Progressing  Goal: Optimal Comfort and Wellbeing  Outcome: Ongoing, Progressing  Goal: Readiness for Transition of Care  Outcome: Ongoing, Progressing     Problem: Bariatric Environmental Safety  Goal: Safety Maintained with Care  Outcome: Ongoing, Progressing     Problem: Infection  Goal: Absence of Infection Signs and Symptoms  Outcome: Ongoing, Progressing     Problem: Hypertensive Disorders in Pregnancy  Goal: Maternal-Fetal Stabilization  Outcome: Ongoing, Progressing     Problem: Pain Acute  Goal: Acceptable Pain Control and Functional Ability  Outcome: Ongoing, Progressing     Problem: Skin Injury Risk Increased  Goal: Skin Health and Integrity  Outcome: Ongoing, Progressing     Problem: Breastfeeding  Goal: Effective Breastfeeding  Outcome: Ongoing, Progressing     Problem: Adjustment to Role Transition (Postpartum Vaginal Delivery)  Goal: Successful Maternal Role Transition  Outcome: Ongoing, Progressing     Problem: Bleeding (Postpartum Vaginal Delivery)  Goal: Hemostasis  Outcome: Ongoing, Progressing     Problem: Infection (Postpartum Vaginal Delivery)  Goal: Absence of Infection Signs/Symptoms  Outcome: Ongoing, Progressing     Problem: Pain (Postpartum Vaginal Delivery)  Goal: Acceptable Pain Control  Outcome: Ongoing, Progressing     Problem: Urinary Retention (Postpartum Vaginal Delivery)  Goal: Effective Urinary Elimination  Outcome: Ongoing, Progressing

## 2023-03-27 VITALS
DIASTOLIC BLOOD PRESSURE: 73 MMHG | OXYGEN SATURATION: 96 % | HEART RATE: 87 BPM | SYSTOLIC BLOOD PRESSURE: 131 MMHG | BODY MASS INDEX: 62.65 KG/M2 | TEMPERATURE: 99 F | WEIGHT: 293 LBS | RESPIRATION RATE: 18 BRPM

## 2023-03-27 LAB — BACTERIA SPEC AEROBE CULT: NORMAL

## 2023-03-27 PROCEDURE — 99238 PR HOSPITAL DISCHARGE DAY,<30 MIN: ICD-10-PCS | Mod: TH,,, | Performed by: OBSTETRICS & GYNECOLOGY

## 2023-03-27 PROCEDURE — 99238 HOSP IP/OBS DSCHRG MGMT 30/<: CPT | Mod: TH,,, | Performed by: OBSTETRICS & GYNECOLOGY

## 2023-03-27 PROCEDURE — 25000003 PHARM REV CODE 250: Performed by: STUDENT IN AN ORGANIZED HEALTH CARE EDUCATION/TRAINING PROGRAM

## 2023-03-27 PROCEDURE — 63600175 PHARM REV CODE 636 W HCPCS: Performed by: STUDENT IN AN ORGANIZED HEALTH CARE EDUCATION/TRAINING PROGRAM

## 2023-03-27 RX ORDER — AMOXICILLIN 250 MG
1 CAPSULE ORAL DAILY
Qty: 14 TABLET | Refills: 0 | Status: SHIPPED | OUTPATIENT
Start: 2023-03-27 | End: 2023-04-03

## 2023-03-27 RX ORDER — AMOXICILLIN 250 MG
1 CAPSULE ORAL DAILY
Qty: 14 TABLET | Refills: 0 | Status: SHIPPED | OUTPATIENT
Start: 2023-03-27 | End: 2023-03-27 | Stop reason: SDUPTHER

## 2023-03-27 RX ORDER — ACETAMINOPHEN AND CODEINE PHOSPHATE 120; 12 MG/5ML; MG/5ML
1 SOLUTION ORAL DAILY
Qty: 90 TABLET | Refills: 3 | Status: SHIPPED | OUTPATIENT
Start: 2023-03-27 | End: 2023-03-27 | Stop reason: SDUPTHER

## 2023-03-27 RX ORDER — ACETAMINOPHEN AND CODEINE PHOSPHATE 120; 12 MG/5ML; MG/5ML
1 SOLUTION ORAL DAILY
Qty: 84 TABLET | Refills: 3 | Status: SHIPPED | OUTPATIENT
Start: 2023-03-27 | End: 2024-03-26

## 2023-03-27 RX ADMIN — PRENATAL VIT W/ FE FUMARATE-FA TAB 27-0.8 MG 1 TABLET: 27-0.8 TAB at 08:03

## 2023-03-27 RX ADMIN — DOCUSATE SODIUM 200 MG: 100 CAPSULE, LIQUID FILLED ORAL at 08:03

## 2023-03-27 RX ADMIN — IBUPROFEN 600 MG: 600 TABLET, FILM COATED ORAL at 08:03

## 2023-03-27 RX ADMIN — ENOXAPARIN SODIUM 40 MG: 40 INJECTION SUBCUTANEOUS at 08:03

## 2023-03-27 NOTE — CARE UPDATE
Resident to bedside prior to discharge. Extensive contraception counseling, patient desires micronor, considering Nexplanon at postpartum visit. Rx to pharmacy. Patient tearful, feeling overwhelmed with transition. Discussed postpartum blues/depression, DONNY precautions, patient verbalizes understanding. 1200 vitals collected while in room, WNL. Will schedule for 1wk BP check and mood check. All questions answered.

## 2023-03-27 NOTE — DISCHARGE SUMMARY
Delivery Discharge Summary  Obstetrics      Primary OB Clinician: Ladonna Tsai MD     Admission date: 3/24/2023  Discharge date: 2023    Disposition: To home, self care    Discharge Diagnosis List:  Patient Active Problem List   Diagnosis    Depression    Bipolar 1 disorder    Acanthosis nigricans    Suspected sleep apnea    Screen for STD (sexually transmitted disease)    Pre-conception counseling    Pregnancy    Eczema    Initial obstetric visit, second trimester    Morbid obesity    Chronic hypertension    Supervision of high-risk pregnancy, third trimester    30 weeks gestation of pregnancy    Encounter for fetal screening for congenital cardiac abnormalities    Obesity affecting pregnancy in third trimester, antepartum    History of chronic hypertension    HSV infection     (spontaneous vaginal delivery)       Procedure:      Hospital Course:  Kaylie Dangelo is a 22 y.o. now , PPD #2 who was admitted on 3/24/2023 at 36w6d for IOL 2/2 cat II tracing. IUP complicated by cHTN, MO, GBS unknown status. Patient was subsequently admitted to labor and delivery unit with signed consents.     Labor course was uncomplicated and resulted in  without complications. Patient declined immediate post-placental IUD.    Please see delivery note for further details. Her postpartum course was uncomplicated. BP was controlled during hospital stay, preE labs WNL, she remained asymptomatic, and did not require initiation of anti-HTN meds. She received lovenox BID postpartum. She was counseled extensively on contraception options, patient desires micronor (rx to pharmacy), considering Nexplanon at postpartum visit. On discharge day, patient's pain is controlled with oral pain medications. Pt is tolerating ambulation without SOB or CP, and regular diet without N/V. Reports lochia is mild. Denies any HA, vision changes, F/C, LE swelling. Denies any breast pain/soreness.    Pt in stable condition and ready  for discharge. She has been instructed to start and/or continue medications and follow up with her obstetrics provider as listed below.    Pertinent studies:  CBC  Recent Labs   Lab 03/24/23  1349 03/26/23  0832   WBC 7.07 12.66   HGB 10.7* 10.1*   HCT 32.4* 30.5*   MCV 82 82    246      Temp:  [97.8 °F (36.6 °C)-99.6 °F (37.6 °C)] 98.9 °F (37.2 °C)  Pulse:  [] 87  Resp:  [16-18] 18  SpO2:  [96 %-99 %] 96 %  BP: (121-138)/(63-73) 131/73      Immunization History   Administered Date(s) Administered    COVID-19, MRNA, LN-S, PF (Pfizer) (Purple Cap) 03/22/2021, 04/13/2021    DTaP 01/08/2003, 06/12/2003, 08/17/2004, 04/22/2005, 05/30/2008    DTaP / Hep B / IPV 05/24/2004    HIB 05/24/2004    HPV Quadrivalent 06/27/2012, 02/18/2013, 10/28/2013    Hep B / HiB 06/12/2003, 10/25/2004    Hepatitis A, Pediatric/Adolescent, 2 Dose 10/29/2015, 10/25/2016    Hepatitis B 01/08/2003    Hepatitis B, Pediatric/Adolescent 04/22/2005    HiB PRP-OMP 06/14/2005    Hib-HbOC 01/08/2003    IPV 01/08/2003, 06/12/2003, 08/17/2004, 04/22/2005, 05/30/2008    Influenza - Intranasal - Trivalent 12/18/2008    Influenza - Trivalent (ADULT) 10/25/2004    Influenza - Trivalent - PF (ADULT) 03/14/2013, 10/28/2013    MMR 05/24/2004, 05/30/2008    Meningococcal Conjugate (MCV4O) 10/28/2013    Pneumococcal Conjugate - 7 Valent 06/12/2003, 05/24/2004, 08/17/2004, 10/25/2004    Tdap 10/28/2013, 02/14/2023    Varicella 05/24/2004, 05/30/2008        Delivery:    Episiotomy: None   Lacerations: None   Repair suture:     Repair # of packets: 1   Blood loss (ml):       Birth information:  YOB: 2021   Time of birth: 9:19 PM   Sex: female   Delivery type: Vaginal, Spontaneous   Gestational Age: 37w0d    Delivery Clinician:      Other providers:       Additional  information:  Forceps:    Vacuum:    Breech:    Observed anomalies      Living?:           APGARS  One minute Five minutes Ten minutes   Skin color:         Heart rate:          Grimace:         Muscle tone:         Breathing:         Totals: 9  9        Placenta: Delivered:       appearance    Patient Instructions:   Current Discharge Medication List        START taking these medications    Details   norethindrone (MICRONOR) 0.35 mg tablet Take 1 tablet (0.35 mg total) by mouth once daily.  Qty: 84 tablet, Refills: 3      senna-docusate 8.6-50 mg (SENNA WITH DOCUSATE SODIUM) 8.6-50 mg per tablet Take 1 tablet by mouth once daily.  Qty: 14 tablet, Refills: 0           STOP taking these medications       medroxyPROGESTERone (PROVERA) 10 MG tablet Comments:   Reason for Stopping:         prenatal 25/iron fum/folic/dha (PRENATAL-1 ORAL) Comments:   Reason for Stopping:               Discharge Procedure Orders   BREAST PUMP FOR HOME USE     Order Specific Question Answer Comments   Type of pump: Electric    Weight: 155.4 kg (342 lb 9.5 oz)    Length of need (1-99 months): 99         Follow-up Information       Ladonna Tsai MD Follow up in 6 week(s).    Specialty: Obstetrics and Gynecology  Why: Post partum check  Contact information:  [address] [address] [phone]               Ladonna Tsai MD Follow up in 1 week(s).    Specialty: Obstetrics and Gynecology  Why: BP check, Mood check  Contact information:  [address] [address] [phone]                                Ladonna Mcgovern MD   OB/GYN PGY1  [facility]

## 2023-03-27 NOTE — ANESTHESIA POSTPROCEDURE EVALUATION
Anesthesia Post Evaluation    Patient: Kaylie Dangelo    Procedure(s) Performed: * No procedures listed *    Final Anesthesia Type: epidural      Patient location during evaluation: floor  Patient participation: Yes- Able to Participate  Level of consciousness: awake and alert  Post-procedure vital signs: reviewed and stable  Pain management: adequate  Airway patency: patent  CORI mitigation strategies: Use of major conduction anesthesia (spinal/epidural) or peripheral nerve block and Multimodal analgesia  PONV status at discharge: No PONV  Anesthetic complications: no      Cardiovascular status: blood pressure returned to baseline and hemodynamically stable  Respiratory status: unassisted, spontaneous ventilation and room air  Hydration status: euvolemic  Follow-up not needed.          Vitals Value Taken Time   /67 03/27/23 0442   Temp 37.1 °C (98.8 °F) 03/27/23 0442   Pulse 99 03/27/23 0442   Resp 18 03/27/23 0442   SpO2 98 % 03/27/23 0442         No case tracking events are documented in the log.      Pain/Evelyne Score: Pain Rating Prior to Med Admin: 8 (3/26/2023  7:48 PM)  Pain Rating Post Med Admin: 2 (3/26/2023  8:40 PM)

## 2023-03-27 NOTE — PLAN OF CARE
VSS, No issues during shift.  Discharge education given to patient. Patient verbalized understanding.  BP and mood check in 1 week.   Follow up with OB in 6 weeks. Awaiting transport for discharge. Will continue to monitor. Amie Mar RN

## 2023-03-27 NOTE — PROGRESS NOTES
POSTPARTUM PROGRESS NOTE    Subjective:     PPD/POD#: 2   Procedure:    EGA: 37w0d   N/V: No   F/C: No   Abd Pain: Mild, well-controlled with oral pain medication   Lochia: Mild   Voiding: Yes   Ambulating: Yes   Bowel fnc: Yes   Breastfeeding: Yes   Contraception: Counseled on options, patient still deciding, will rediscuss prior to discharge. Declined ppIUD.   Circumcision: N/A, female     Objective:      Temp:  [97.8 °F (36.6 °C)-99.6 °F (37.6 °C)] 98.8 °F (37.1 °C)  Pulse:  [] 99  Resp:  [18] 18  SpO2:  [97 %-99 %] 98 %  BP: (121-130)/(63-73) 129/67    Lung: Normal respiratory effort   Abdomen: Soft, appropriately tender   Uterus: Firm, no fundal tenderness   Incision: N/A   : Deferred   Extremities: Bilateral trace edema     Lab Review    Recent Labs   Lab 23  1349      K 3.4*      CO2 22*   BUN 9   CREATININE 0.6   GLU 84   PROT 6.3   BILITOT 0.6   ALKPHOS 123   ALT 43   AST 27         Recent Labs   Lab 23  1349 23  0832   WBC 7.07 12.66   HGB 10.7* 10.1*   HCT 32.4* 30.5*   MCV 82 82    246           I/O  No intake or output data in the 24 hours ending 23 0623       Assessment and Plan:   Postpartum care:  - Patient doing well.  - Continue routine management and advances.    cHTN  - BP as above  - asymptomatic  - preE labs as above  - Hypertensive agent not indicated at this time    Obesity  - PrePreg BMI 62  - Encourage ambulation  - Lovenox: 40 mg BID    Questionable h/o HSV  - Not complaining of symptoms  - Negative speculum exam for lesions      Ladonna Mcgovern MD   OB/GYN PGY1  [facility]

## 2023-03-28 ENCOUNTER — PATIENT MESSAGE (OUTPATIENT)
Dept: OBSTETRICS AND GYNECOLOGY | Facility: OTHER | Age: 23
End: 2023-03-28
Payer: MEDICAID

## 2023-03-29 ENCOUNTER — TELEPHONE (OUTPATIENT)
Dept: OBSTETRICS AND GYNECOLOGY | Facility: CLINIC | Age: 23
End: 2023-03-29
Payer: MEDICAID

## 2023-03-30 ENCOUNTER — TELEPHONE (OUTPATIENT)
Dept: OBSTETRICS AND GYNECOLOGY | Facility: CLINIC | Age: 23
End: 2023-03-30
Payer: MEDICAID

## 2023-04-03 ENCOUNTER — POSTPARTUM VISIT (OUTPATIENT)
Dept: OBSTETRICS AND GYNECOLOGY | Facility: CLINIC | Age: 23
End: 2023-04-03
Payer: MEDICAID

## 2023-04-03 VITALS
HEIGHT: 62 IN | DIASTOLIC BLOOD PRESSURE: 80 MMHG | SYSTOLIC BLOOD PRESSURE: 124 MMHG | BODY MASS INDEX: 53.92 KG/M2 | WEIGHT: 293 LBS

## 2023-04-03 DIAGNOSIS — E66.01 MORBID OBESITY: ICD-10-CM

## 2023-04-03 DIAGNOSIS — F32.A DEPRESSION, UNSPECIFIED DEPRESSION TYPE: ICD-10-CM

## 2023-04-03 DIAGNOSIS — Z86.79 HISTORY OF CHRONIC HYPERTENSION: ICD-10-CM

## 2023-04-03 PROCEDURE — 99999 PR PBB SHADOW E&M-EST. PATIENT-LVL III: ICD-10-PCS | Mod: PBBFAC,,, | Performed by: OBSTETRICS & GYNECOLOGY

## 2023-04-03 PROCEDURE — 99213 OFFICE O/P EST LOW 20 MIN: CPT | Mod: PBBFAC,TH | Performed by: OBSTETRICS & GYNECOLOGY

## 2023-04-03 PROCEDURE — 99999 PR PBB SHADOW E&M-EST. PATIENT-LVL III: CPT | Mod: PBBFAC,,, | Performed by: OBSTETRICS & GYNECOLOGY

## 2023-04-03 PROCEDURE — 59430 PR CARE AFTER DELIVERY ONLY: ICD-10-PCS | Mod: ,,, | Performed by: OBSTETRICS & GYNECOLOGY

## 2023-04-03 NOTE — Clinical Note
Hello! I know I have reached out to you regarding this patient in the past, but she is 1 week postpartum with significantly low mood, and I think would benefit from starting some type of antidepressant or mood stabilizing therapy. She has a documented history of bipolar and notes having been on several different types of medications in the past although is unsure which ones. She is very interested in therapy and medication, and seems more motivated now that she is no longer pregnant. She has a poor social situation as well for which I have reached out to Social Work. Any help you can give her is much appreciated. She responds best via portal message and does not often answer calls, just ANTOINE.

## 2023-04-03 NOTE — ASSESSMENT & PLAN NOTE
Overall doing well postpartum but with some social and mood issues. Feels low mood and has little support at home. Does not live with FOB, family/friends are in [place]. Interested in therapy and possible medication. Denies SI/HI. Breast feeding/pumping, POPs for contraception. Continue pelvic rest. Has lost a significant amount of weight, not eating as much due to having the baby.   Counseled on decreased efficacy of POPs when compared to other types of contraception as well as need to take the pills around the same time every day. Rx sent to [facility] pharmacy, patient encouraged to start taking.

## 2023-04-03 NOTE — PROGRESS NOTES
"  Chief Complaint   Patient presents with    Postpartum Care       HPI:   22 y.o.  here today for 1 week BP check. She is s/p  3/25/23, IOL at 36w6d for NR fetal status during NST. Delivered a VFI (Ameya) 6#2oz, Apgars 8/9. Repair of midline vaginal laceration. Documented history of CHTN but no abnormal pressures during the pregnancy. Uncomplicated PP course. Pumping breast milk, baby doesn't latch well. Weight went up 1 lb since birth. Denies significant bleeding or cramping. Mood is low and patient does not have help at home. Does not live with FOB. Her family and friends are currently located in . Denies SI/HI but struggling and asking for help to "feel better" today. Declined immediate postplacental IUD.      Labs / Significant Studies  Pap: N/A    Admission on 2023, Discharged on 2023   Component Date Value Ref Range Status    WBC 2023 7.07  3.90 - 12.70 K/uL Final    RBC 2023 3.95 (L)  4.00 - 5.40 M/uL Final    Hemoglobin 2023 10.7 (L)  12.0 - 16.0 g/dL Final    Hematocrit 2023 32.4 (L)  37.0 - 48.5 % Final    MCV 2023 82  82 - 98 fL Final    MCH 2023 27.1  27.0 - 31.0 pg Final    MCHC 2023 33.0  32.0 - 36.0 g/dL Final    RDW 2023 13.1  11.5 - 14.5 % Final    Platelets 2023 315  150 - 450 K/uL Final    MPV 2023 10.7  9.2 - 12.9 fL Final    Immature Granulocytes 2023 0.4  0.0 - 0.5 % Final    Gran # (ANC) 2023 4.1  1.8 - 7.7 K/uL Final    Immature Grans (Abs) 2023 0.03  0.00 - 0.04 K/uL Final    Comment: Mild elevation in immature granulocytes is non specific and   can be seen in a variety of conditions including stress response,   acute inflammation, trauma and pregnancy. Correlation with other   laboratory and clinical findings is essential.      Lymph # 2023 2.4  1.0 - 4.8 K/uL Final    Mono # 2023 0.5  0.3 - 1.0 K/uL Final    Eos # 2023 0.0  0.0 - 0.5 K/uL Final    Baso # 2023 0.02  " 0.00 - 0.20 K/uL Final    nRBC 03/24/2023 0  0 /100 WBC Final    Gran % 03/24/2023 58.1  38.0 - 73.0 % Final    Lymph % 03/24/2023 33.8  18.0 - 48.0 % Final    Mono % 03/24/2023 7.4  4.0 - 15.0 % Final    Eosinophil % 03/24/2023 0.0  0.0 - 8.0 % Final    Basophil % 03/24/2023 0.3  0.0 - 1.9 % Final    Differential Method 03/24/2023 Automated   Final    Sodium 03/24/2023 137  136 - 145 mmol/L Final    Potassium 03/24/2023 3.4 (L)  3.5 - 5.1 mmol/L Final    Chloride 03/24/2023 108  95 - 110 mmol/L Final    CO2 03/24/2023 22 (L)  23 - 29 mmol/L Final    Glucose 03/24/2023 84  70 - 110 mg/dL Final    BUN 03/24/2023 9  6 - 20 mg/dL Final    Creatinine 03/24/2023 0.6  0.5 - 1.4 mg/dL Final    Calcium 03/24/2023 8.8  8.7 - 10.5 mg/dL Final    Total Protein 03/24/2023 6.3  6.0 - 8.4 g/dL Final    Albumin 03/24/2023 2.4 (L)  3.5 - 5.2 g/dL Final    Total Bilirubin 03/24/2023 0.6  0.1 - 1.0 mg/dL Final    Comment: For infants and newborns, interpretation of results should be based  on gestational age, weight and in agreement with clinical  observations.    Premature Infant recommended reference ranges:  Up to 24 hours.............<8.0 mg/dL  Up to 48 hours............<12.0 mg/dL  3-5 days..................<15.0 mg/dL  6-29 days.................<15.0 mg/dL      Alkaline Phosphatase 03/24/2023 123  55 - 135 U/L Final    AST 03/24/2023 27  10 - 40 U/L Final    ALT 03/24/2023 43  10 - 44 U/L Final    Anion Gap 03/24/2023 7 (L)  8 - 16 mmol/L Final    eGFR 03/24/2023 >60  >60 mL/min/1.73 m^2 Final    Group & Rh 03/24/2023 B POS   Final    Indirect Hamzah 03/24/2023 NEG   Final    Specimen Outdate 03/24/2023 03/27/2023 23:59   Final    Protein, Urine Random 03/24/2023 28 (H)  0 - 15 mg/dL Final    Creatinine, Urine 03/24/2023 292.6  15.0 - 325.0 mg/dL Final    Prot/Creat Ratio, Urine 03/24/2023 0.10  0.00 - 0.20 Final    Vancomycin-Trough 03/25/2023 24.7 (H)  10.0 - 22.0 ug/mL Final    WBC 03/26/2023 12.66  3.90 - 12.70 K/uL Final     RBC 03/26/2023 3.70 (L)  4.00 - 5.40 M/uL Final    Hemoglobin 03/26/2023 10.1 (L)  12.0 - 16.0 g/dL Final    Hematocrit 03/26/2023 30.5 (L)  37.0 - 48.5 % Final    MCV 03/26/2023 82  82 - 98 fL Final    MCH 03/26/2023 27.3  27.0 - 31.0 pg Final    MCHC 03/26/2023 33.1  32.0 - 36.0 g/dL Final    RDW 03/26/2023 13.1  11.5 - 14.5 % Final    Platelets 03/26/2023 246  150 - 450 K/uL Final    MPV 03/26/2023 10.7  9.2 - 12.9 fL Final    Immature Granulocytes 03/26/2023 0.3  0.0 - 0.5 % Final    Gran # (ANC) 03/26/2023 9.2 (H)  1.8 - 7.7 K/uL Final    Immature Grans (Abs) 03/26/2023 0.04  0.00 - 0.04 K/uL Final    Comment: Mild elevation in immature granulocytes is non specific and   can be seen in a variety of conditions including stress response,   acute inflammation, trauma and pregnancy. Correlation with other   laboratory and clinical findings is essential.      Lymph # 03/26/2023 2.1  1.0 - 4.8 K/uL Final    Mono # 03/26/2023 1.3 (H)  0.3 - 1.0 K/uL Final    Eos # 03/26/2023 0.0  0.0 - 0.5 K/uL Final    Baso # 03/26/2023 0.02  0.00 - 0.20 K/uL Final    nRBC 03/26/2023 0  0 /100 WBC Final    Gran % 03/26/2023 72.3  38.0 - 73.0 % Final    Lymph % 03/26/2023 16.8 (L)  18.0 - 48.0 % Final    Mono % 03/26/2023 10.4  4.0 - 15.0 % Final    Eosinophil % 03/26/2023 0.0  0.0 - 8.0 % Final    Basophil % 03/26/2023 0.2  0.0 - 1.9 % Final    Differential Method 03/26/2023 Automated   Final   Routine Prenatal on 03/24/2023   Component Date Value Ref Range Status    Strep B Culture 03/24/2023 No Group B Streptococcus isolated   Final   Admission on 03/15/2023, Discharged on 03/15/2023   Component Date Value Ref Range Status    WBC 03/15/2023 7.42  3.90 - 12.70 K/uL Final    RBC 03/15/2023 4.01  4.00 - 5.40 M/uL Final    Hemoglobin 03/15/2023 11.2 (L)  12.0 - 16.0 g/dL Final    Hematocrit 03/15/2023 33.5 (L)  37.0 - 48.5 % Final    MCV 03/15/2023 84  82 - 98 fL Final    MCH 03/15/2023 27.9  27.0 - 31.0 pg Final    MCHC 03/15/2023  33.4  32.0 - 36.0 g/dL Final    RDW 03/15/2023 13.0  11.5 - 14.5 % Final    Platelets 03/15/2023 322  150 - 450 K/uL Final    MPV 03/15/2023 10.6  9.2 - 12.9 fL Final    Immature Granulocytes 03/15/2023 0.4  0.0 - 0.5 % Final    Gran # (ANC) 03/15/2023 4.6  1.8 - 7.7 K/uL Final    Immature Grans (Abs) 03/15/2023 0.03  0.00 - 0.04 K/uL Final    Comment: Mild elevation in immature granulocytes is non specific and   can be seen in a variety of conditions including stress response,   acute inflammation, trauma and pregnancy. Correlation with other   laboratory and clinical findings is essential.      Lymph # 03/15/2023 2.2  1.0 - 4.8 K/uL Final    Mono # 03/15/2023 0.6  0.3 - 1.0 K/uL Final    Eos # 03/15/2023 0.0  0.0 - 0.5 K/uL Final    Baso # 03/15/2023 0.01  0.00 - 0.20 K/uL Final    nRBC 03/15/2023 0  0 /100 WBC Final    Gran % 03/15/2023 61.9  38.0 - 73.0 % Final    Lymph % 03/15/2023 29.6  18.0 - 48.0 % Final    Mono % 03/15/2023 8.0  4.0 - 15.0 % Final    Eosinophil % 03/15/2023 0.0  0.0 - 8.0 % Final    Basophil % 03/15/2023 0.1  0.0 - 1.9 % Final    Differential Method 03/15/2023 Automated   Final    Sodium 03/15/2023 138  136 - 145 mmol/L Final    Potassium 03/15/2023 3.6  3.5 - 5.1 mmol/L Final    Chloride 03/15/2023 109  95 - 110 mmol/L Final    CO2 03/15/2023 21 (L)  23 - 29 mmol/L Final    Glucose 03/15/2023 75  70 - 110 mg/dL Final    BUN 03/15/2023 5 (L)  6 - 20 mg/dL Final    Creatinine 03/15/2023 0.6  0.5 - 1.4 mg/dL Final    Calcium 03/15/2023 9.1  8.7 - 10.5 mg/dL Final    Total Protein 03/15/2023 6.4  6.0 - 8.4 g/dL Final    Albumin 03/15/2023 2.4 (L)  3.5 - 5.2 g/dL Final    Total Bilirubin 03/15/2023 0.9  0.1 - 1.0 mg/dL Final    Comment: For infants and newborns, interpretation of results should be based  on gestational age, weight and in agreement with clinical  observations.    Premature Infant recommended reference ranges:  Up to 24 hours.............<8.0 mg/dL  Up to 48  hours............<12.0 mg/dL  3-5 days..................<15.0 mg/dL  6-29 days.................<15.0 mg/dL      Alkaline Phosphatase 03/15/2023 116  55 - 135 U/L Final    AST 03/15/2023 27  10 - 40 U/L Final    ALT 03/15/2023 29  10 - 44 U/L Final    Anion Gap 03/15/2023 8  8 - 16 mmol/L Final    eGFR 03/15/2023 >60  >60 mL/min/1.73 m^2 Final    Protein, Urine Random 03/15/2023 30 (H)  0 - 15 mg/dL Final    Creatinine, Urine 03/15/2023 317.2  15.0 - 325.0 mg/dL Final    Prot/Creat Ratio, Urine 03/15/2023 0.09  0.00 - 0.20 Final    Specimen UA 03/15/2023 Urine, Clean Catch   Final    Color, UA 03/15/2023 Orange (A)  Yellow, Straw, [place] Final    Appearance, UA 03/15/2023 Cloudy (A)  Clear Final    pH, UA 03/15/2023 6.0  5.0 - 8.0 Final    Specific Gravity, UA 03/15/2023 1.030  1.005 - 1.030 Final    Protein, UA 03/15/2023 1+ (A)  Negative Final    Comment: Recommend a 24 hour urine protein or a urine   protein/creatinine ratio if globulin induced proteinuria is  clinically suspected.      Glucose, UA 03/15/2023 Negative  Negative Final    Ketones, UA 03/15/2023 3+ (A)  Negative Final    Bilirubin (UA) 03/15/2023 1+ (A)  Negative Final    Comment: Positive urine bilirubin is not confirmed. Correlate with   serum bilirubin and clinical presentation.      Occult Blood UA 03/15/2023 Negative  Negative Final    Nitrite, UA 03/15/2023 Negative  Negative Final    Urobilinogen, UA 03/15/2023 4.0-6.0 (A)  <2.0 EU/dL Final    Leukocytes, UA 03/15/2023 3+ (A)  Negative Final    RPR 03/15/2023 Non-reactive  Non-reactive Final    HIV 1/2 Ag/Ab 03/15/2023 Negative  Negative Final    RBC, UA 03/15/2023 4  0 - 4 /hpf Final    WBC, UA 03/15/2023 26 (H)  0 - 5 /hpf Final    Bacteria 03/15/2023 Few (A)  None-Occ /hpf Final    Squam Epithel, UA 03/15/2023 58  /hpf Final    Hyaline Casts, UA 03/15/2023 3 (A)  0-1/lpf /lpf Final    Microscopic Comment 03/15/2023 SEE COMMENT   Final    Comment: Other formed elements not mentioned in the  report are not   present in the microscopic examination.       Urine Culture, Routine 03/15/2023 Multiple organisms isolated. None in predominance.  Repeat if   Final    Urine Culture, Routine 03/15/2023 clinically necessary.   Final   Routine Prenatal on 01/17/2023   Component Date Value Ref Range Status    Urine Culture, Routine 01/17/2023 Multiple organisms isolated. None in predominance.  Repeat if   Final    Urine Culture, Routine 01/17/2023 clinically necessary.   Final   Lab Visit on 01/05/2023   Component Date Value Ref Range Status    OB Glucose Screen 01/05/2023 82  70 - 140 mg/dL Final    WBC 01/05/2023 8.14  3.90 - 12.70 K/uL Final    RBC 01/05/2023 3.99 (L)  4.00 - 5.40 M/uL Final    Hemoglobin 01/05/2023 11.6 (L)  12.0 - 16.0 g/dL Final    Hematocrit 01/05/2023 33.4 (L)  37.0 - 48.5 % Final    MCV 01/05/2023 84  82 - 98 fL Final    MCH 01/05/2023 29.1  27.0 - 31.0 pg Final    MCHC 01/05/2023 34.7  32.0 - 36.0 g/dL Final    RDW 01/05/2023 12.9  11.5 - 14.5 % Final    Platelets 01/05/2023 287  150 - 450 K/uL Final    MPV 01/05/2023 10.5  9.2 - 12.9 fL Final    Immature Granulocytes 01/05/2023 0.9 (H)  0.0 - 0.5 % Final    Gran # (ANC) 01/05/2023 5.0  1.8 - 7.7 K/uL Final    Immature Grans (Abs) 01/05/2023 0.07 (H)  0.00 - 0.04 K/uL Final    Comment: Mild elevation in immature granulocytes is non specific and   can be seen in a variety of conditions including stress response,   acute inflammation, trauma and pregnancy. Correlation with other   laboratory and clinical findings is essential.      Lymph # 01/05/2023 2.3  1.0 - 4.8 K/uL Final    Mono # 01/05/2023 0.7  0.3 - 1.0 K/uL Final    Eos # 01/05/2023 0.1  0.0 - 0.5 K/uL Final    Baso # 01/05/2023 0.01  0.00 - 0.20 K/uL Final    nRBC 01/05/2023 0  0 /100 WBC Final    Gran % 01/05/2023 61.8  38.0 - 73.0 % Final    Lymph % 01/05/2023 28.1  18.0 - 48.0 % Final    Mono % 01/05/2023 8.0  4.0 - 15.0 % Final    Eosinophil % 01/05/2023 1.1  0.0 - 8.0 % Final     Basophil % 2023 0.1  0.0 - 1.9 % Final    Differential Method 2023 Automated   Final    Sodium 2023 137  136 - 145 mmol/L Final    Potassium 2023 3.5  3.5 - 5.1 mmol/L Final    Chloride 2023 109  95 - 110 mmol/L Final    CO2 2023 23  23 - 29 mmol/L Final    Glucose 2023 82  70 - 110 mg/dL Final    BUN 2023 7  6 - 20 mg/dL Final    Creatinine 2023 0.7  0.5 - 1.4 mg/dL Final    Calcium 2023 9.2  8.7 - 10.5 mg/dL Final    Total Protein 2023 6.4  6.0 - 8.4 g/dL Final    Albumin 2023 2.6 (L)  3.5 - 5.2 g/dL Final    Total Bilirubin 2023 0.3  0.1 - 1.0 mg/dL Final    Comment: For infants and newborns, interpretation of results should be based  on gestational age, weight and in agreement with clinical  observations.    Premature Infant recommended reference ranges:  Up to 24 hours.............<8.0 mg/dL  Up to 48 hours............<12.0 mg/dL  3-5 days..................<15.0 mg/dL  6-29 days.................<15.0 mg/dL      Alkaline Phosphatase 2023 71  55 - 135 U/L Final    AST 2023 35  10 - 40 U/L Final    ALT 2023 63 (H)  10 - 44 U/L Final    Anion Gap 2023 5 (L)  8 - 16 mmol/L Final    eGFR 2023 >60  >60 mL/min/1.73 m^2 Final        Past Medical History:   Diagnosis Date    ADHD, predominantly hyperactive-impulsive subtype     Asthma     Bipolar 1 disorder     Depression     Hypertension     Insomnia      History reviewed. No pertinent surgical history.    Current Outpatient Medications:     norethindrone (MICRONOR) 0.35 mg tablet, Take 1 tablet (0.35 mg total) by mouth once daily., Disp: 84 tablet, Rfl: 3  Review of patient's allergies indicates:   Allergen Reactions    Oranges [orange]      Per patient- other citrus products are okay    Penicillins     Lamotrigine Rash     Reported by mother on 20     OB History    Para Term  AB Living   1 1 1 0 0 1   SAB IAB Ectopic Multiple Live Births    0 0 0 0 1      # Outcome Date GA Lbr Asif/2nd Weight Sex Delivery Anes PTL Lv   1 Term 23 37w0d / 02:01 2.78 kg (6 lb 2.1 oz) F Vag-Spont EPI N BUTCH     Social History     Tobacco Use    Smoking status: Former    Smokeless tobacco: Never   Substance Use Topics    Alcohol use: No    Drug use: Not Currently     Types: Marijuana     Family History   Problem Relation Age of Onset    Diabetes Mother     Heart failure Mother     Breast cancer Neg Hx     Colon cancer Neg Hx     Ovarian cancer Neg Hx        Review of Systems   Negative except as in HPI      Physical Exam   Vitals:    23 1315   BP: 124/80     Body mass index is 58.06 kg/m².    Physical Exam  Constitutional:       General: She is not in acute distress.     Appearance: Normal appearance.   HENT:      Head: Normocephalic and atraumatic.   Pulmonary:      Effort: Pulmonary effort is normal.   Musculoskeletal:         General: Normal range of motion.      Cervical back: Normal range of motion.   Neurological:      General: No focal deficit present.      Mental Status: She is alert and oriented to person, place, and time.   Skin:     General: Skin is warm and dry.   Psychiatric:         Mood and Affect: Mood normal.         Behavior: Behavior normal.         Thought Content: Thought content normal.        Labs reviewed: CBC, CMP, P:C    ASSESSMENT:   Patient Active Problem List   Diagnosis    Depression    Bipolar 1 disorder    Acanthosis nigricans    Suspected sleep apnea    Screen for STD (sexually transmitted disease)    Pregnancy    Eczema    Morbid obesity    Chronic hypertension    History of chronic hypertension    HSV infection     (spontaneous vaginal delivery)    Encounter for postpartum visit       PLAN:  Problem List Items Addressed This Visit          Psychiatric    Depression    Relevant Orders    Ambulatory referral/consult to Psychiatry       Cardiac/Vascular    History of chronic hypertension       Endocrine    Morbid obesity        Obstetric     (spontaneous vaginal delivery)    Encounter for postpartum visit - Primary    Current Assessment & Plan     Overall doing well postpartum but with some social and mood issues. Feels low mood and has little support at home. Does not live with FOB, family/friends are in [place]. Interested in therapy and possible medication. Denies SI/HI. Breast feeding/pumping, POPs for contraception. Continue pelvic rest. Has lost a significant amount of weight, not eating as much due to having the baby.   Counseled on decreased efficacy of POPs when compared to other types of contraception as well as need to take the pills around the same time every day. Rx sent to [facility] pharmacy, patient encouraged to start taking.            Relevant Orders    Ambulatory referral/consult to Psychiatry        Total time spent on this encounter was 20 minutes.  This includes preparing to see the patient;  obtaining/reviewing separately obtained history;  performing a medical exam and/or evaluation;   counseling/educating the patient;  ordering medications, tests, of procedures;   referring/communicating with other health care professionals;  EMR documentation;  interpreting/communicating results to the patient;  and/or care coordination.    Follow up in about 5 weeks (around 2023) for Postpartum.       Ladonna Tsai MD  Department of Obstetrics & Gynecology  [facility]

## 2023-04-05 ENCOUNTER — SOCIAL WORK (OUTPATIENT)
Dept: CASE MANAGEMENT | Facility: OTHER | Age: 23
End: 2023-04-05
Payer: MEDICAID

## 2023-04-05 NOTE — PROGRESS NOTES
SW contacted pt after referral received from OB clinic. Pt did not answer, and SW left a message. SW can be reached at [phone].

## 2023-04-06 ENCOUNTER — PATIENT MESSAGE (OUTPATIENT)
Dept: OBSTETRICS AND GYNECOLOGY | Facility: CLINIC | Age: 23
End: 2023-04-06
Payer: MEDICAID

## 2023-05-05 ENCOUNTER — POSTPARTUM VISIT (OUTPATIENT)
Dept: OBSTETRICS AND GYNECOLOGY | Facility: CLINIC | Age: 23
End: 2023-05-05
Payer: MEDICAID

## 2023-05-05 VITALS
BODY MASS INDEX: 53.92 KG/M2 | HEIGHT: 62 IN | SYSTOLIC BLOOD PRESSURE: 106 MMHG | WEIGHT: 293 LBS | DIASTOLIC BLOOD PRESSURE: 76 MMHG

## 2023-05-05 DIAGNOSIS — Z86.79 HISTORY OF CHRONIC HYPERTENSION: ICD-10-CM

## 2023-05-05 DIAGNOSIS — F32.A DEPRESSION, UNSPECIFIED DEPRESSION TYPE: ICD-10-CM

## 2023-05-05 DIAGNOSIS — E66.01 MORBID OBESITY: ICD-10-CM

## 2023-05-05 PROCEDURE — 0503F PR POSTPARTUM CARE VISIT: ICD-10-PCS | Mod: ,,, | Performed by: OBSTETRICS & GYNECOLOGY

## 2023-05-05 PROCEDURE — 99999 PR PBB SHADOW E&M-EST. PATIENT-LVL II: ICD-10-PCS | Mod: PBBFAC,,, | Performed by: OBSTETRICS & GYNECOLOGY

## 2023-05-05 PROCEDURE — 99999 PR PBB SHADOW E&M-EST. PATIENT-LVL II: CPT | Mod: PBBFAC,,, | Performed by: OBSTETRICS & GYNECOLOGY

## 2023-05-05 PROCEDURE — 0503F POSTPARTUM CARE VISIT: CPT | Mod: ,,, | Performed by: OBSTETRICS & GYNECOLOGY

## 2023-05-05 PROCEDURE — 99212 OFFICE O/P EST SF 10 MIN: CPT | Mod: PBBFAC,TH | Performed by: OBSTETRICS & GYNECOLOGY

## 2023-05-05 NOTE — PROGRESS NOTES
"  Chief Complaint   Patient presents with    Postpartum Care       HPI:   22 y.o.  here today for 6 weeks postpartum visit. She is s/p  3/25/23, IOL at 36w6d for NR fetal status during NST. Delivered a VFI (Ameya) 6#2oz, Apgars 8/9. Repair of midline vaginal laceration. Documented history of CHTN but no abnormal pressures during the pregnancy. Uncomplicated PP course. Pumping and breast feeding. Denies significant bleeding or cramping. Mood has improved since her last visit, she is currently in therapy. Declined immediate postplacental IUD but states she has been taking POPs for contraception. No sexual activity since the delivery.  Baby's father tried to get CPS called on the patient for "drinking and smoking" during the pregnancy.   Notes some back pain but denies numbness, weakness, tingling, or difficulty walking.     LMP Dates from Last 1 Encounters:   LMP: 2022     Labs / Significant Studies  Pap: N/A    Admission on 2023, Discharged on 2023   Component Date Value Ref Range Status    WBC 2023 7.07  3.90 - 12.70 K/uL Final    RBC 2023 3.95 (L)  4.00 - 5.40 M/uL Final    Hemoglobin 2023 10.7 (L)  12.0 - 16.0 g/dL Final    Hematocrit 2023 32.4 (L)  37.0 - 48.5 % Final    MCV 2023 82  82 - 98 fL Final    MCH 2023 27.1  27.0 - 31.0 pg Final    MCHC 2023 33.0  32.0 - 36.0 g/dL Final    RDW 2023 13.1  11.5 - 14.5 % Final    Platelets 2023 315  150 - 450 K/uL Final    MPV 2023 10.7  9.2 - 12.9 fL Final    Immature Granulocytes 2023 0.4  0.0 - 0.5 % Final    Gran # (ANC) 2023 4.1  1.8 - 7.7 K/uL Final    Immature Grans (Abs) 2023 0.03  0.00 - 0.04 K/uL Final    Comment: Mild elevation in immature granulocytes is non specific and   can be seen in a variety of conditions including stress response,   acute inflammation, trauma and pregnancy. Correlation with other   laboratory and clinical findings is essential.      " Lymph # 03/24/2023 2.4  1.0 - 4.8 K/uL Final    Mono # 03/24/2023 0.5  0.3 - 1.0 K/uL Final    Eos # 03/24/2023 0.0  0.0 - 0.5 K/uL Final    Baso # 03/24/2023 0.02  0.00 - 0.20 K/uL Final    nRBC 03/24/2023 0  0 /100 WBC Final    Gran % 03/24/2023 58.1  38.0 - 73.0 % Final    Lymph % 03/24/2023 33.8  18.0 - 48.0 % Final    Mono % 03/24/2023 7.4  4.0 - 15.0 % Final    Eosinophil % 03/24/2023 0.0  0.0 - 8.0 % Final    Basophil % 03/24/2023 0.3  0.0 - 1.9 % Final    Differential Method 03/24/2023 Automated   Final    Sodium 03/24/2023 137  136 - 145 mmol/L Final    Potassium 03/24/2023 3.4 (L)  3.5 - 5.1 mmol/L Final    Chloride 03/24/2023 108  95 - 110 mmol/L Final    CO2 03/24/2023 22 (L)  23 - 29 mmol/L Final    Glucose 03/24/2023 84  70 - 110 mg/dL Final    BUN 03/24/2023 9  6 - 20 mg/dL Final    Creatinine 03/24/2023 0.6  0.5 - 1.4 mg/dL Final    Calcium 03/24/2023 8.8  8.7 - 10.5 mg/dL Final    Total Protein 03/24/2023 6.3  6.0 - 8.4 g/dL Final    Albumin 03/24/2023 2.4 (L)  3.5 - 5.2 g/dL Final    Total Bilirubin 03/24/2023 0.6  0.1 - 1.0 mg/dL Final    Comment: For infants and newborns, interpretation of results should be based  on gestational age, weight and in agreement with clinical  observations.    Premature Infant recommended reference ranges:  Up to 24 hours.............<8.0 mg/dL  Up to 48 hours............<12.0 mg/dL  3-5 days..................<15.0 mg/dL  6-29 days.................<15.0 mg/dL      Alkaline Phosphatase 03/24/2023 123  55 - 135 U/L Final    AST 03/24/2023 27  10 - 40 U/L Final    ALT 03/24/2023 43  10 - 44 U/L Final    Anion Gap 03/24/2023 7 (L)  8 - 16 mmol/L Final    eGFR 03/24/2023 >60  >60 mL/min/1.73 m^2 Final    Group & Rh 03/24/2023 B POS   Final    Indirect Hamzah 03/24/2023 NEG   Final    Specimen Outdate 03/24/2023 03/27/2023 23:59   Final    Protein, Urine Random 03/24/2023 28 (H)  0 - 15 mg/dL Final    Creatinine, Urine 03/24/2023 292.6  15.0 - 325.0 mg/dL Final     Prot/Creat Ratio, Urine 03/24/2023 0.10  0.00 - 0.20 Final    Vancomycin-Trough 03/25/2023 24.7 (H)  10.0 - 22.0 ug/mL Final    WBC 03/26/2023 12.66  3.90 - 12.70 K/uL Final    RBC 03/26/2023 3.70 (L)  4.00 - 5.40 M/uL Final    Hemoglobin 03/26/2023 10.1 (L)  12.0 - 16.0 g/dL Final    Hematocrit 03/26/2023 30.5 (L)  37.0 - 48.5 % Final    MCV 03/26/2023 82  82 - 98 fL Final    MCH 03/26/2023 27.3  27.0 - 31.0 pg Final    MCHC 03/26/2023 33.1  32.0 - 36.0 g/dL Final    RDW 03/26/2023 13.1  11.5 - 14.5 % Final    Platelets 03/26/2023 246  150 - 450 K/uL Final    MPV 03/26/2023 10.7  9.2 - 12.9 fL Final    Immature Granulocytes 03/26/2023 0.3  0.0 - 0.5 % Final    Gran # (ANC) 03/26/2023 9.2 (H)  1.8 - 7.7 K/uL Final    Immature Grans (Abs) 03/26/2023 0.04  0.00 - 0.04 K/uL Final    Comment: Mild elevation in immature granulocytes is non specific and   can be seen in a variety of conditions including stress response,   acute inflammation, trauma and pregnancy. Correlation with other   laboratory and clinical findings is essential.      Lymph # 03/26/2023 2.1  1.0 - 4.8 K/uL Final    Mono # 03/26/2023 1.3 (H)  0.3 - 1.0 K/uL Final    Eos # 03/26/2023 0.0  0.0 - 0.5 K/uL Final    Baso # 03/26/2023 0.02  0.00 - 0.20 K/uL Final    nRBC 03/26/2023 0  0 /100 WBC Final    Gran % 03/26/2023 72.3  38.0 - 73.0 % Final    Lymph % 03/26/2023 16.8 (L)  18.0 - 48.0 % Final    Mono % 03/26/2023 10.4  4.0 - 15.0 % Final    Eosinophil % 03/26/2023 0.0  0.0 - 8.0 % Final    Basophil % 03/26/2023 0.2  0.0 - 1.9 % Final    Differential Method 03/26/2023 Automated   Final   Routine Prenatal on 03/24/2023   Component Date Value Ref Range Status    Strep B Culture 03/24/2023 No Group B Streptococcus isolated   Final   Admission on 03/15/2023, Discharged on 03/15/2023   Component Date Value Ref Range Status    WBC 03/15/2023 7.42  3.90 - 12.70 K/uL Final    RBC 03/15/2023 4.01  4.00 - 5.40 M/uL Final    Hemoglobin 03/15/2023 11.2 (L)  12.0 -  16.0 g/dL Final    Hematocrit 03/15/2023 33.5 (L)  37.0 - 48.5 % Final    MCV 03/15/2023 84  82 - 98 fL Final    MCH 03/15/2023 27.9  27.0 - 31.0 pg Final    MCHC 03/15/2023 33.4  32.0 - 36.0 g/dL Final    RDW 03/15/2023 13.0  11.5 - 14.5 % Final    Platelets 03/15/2023 322  150 - 450 K/uL Final    MPV 03/15/2023 10.6  9.2 - 12.9 fL Final    Immature Granulocytes 03/15/2023 0.4  0.0 - 0.5 % Final    Gran # (ANC) 03/15/2023 4.6  1.8 - 7.7 K/uL Final    Immature Grans (Abs) 03/15/2023 0.03  0.00 - 0.04 K/uL Final    Comment: Mild elevation in immature granulocytes is non specific and   can be seen in a variety of conditions including stress response,   acute inflammation, trauma and pregnancy. Correlation with other   laboratory and clinical findings is essential.      Lymph # 03/15/2023 2.2  1.0 - 4.8 K/uL Final    Mono # 03/15/2023 0.6  0.3 - 1.0 K/uL Final    Eos # 03/15/2023 0.0  0.0 - 0.5 K/uL Final    Baso # 03/15/2023 0.01  0.00 - 0.20 K/uL Final    nRBC 03/15/2023 0  0 /100 WBC Final    Gran % 03/15/2023 61.9  38.0 - 73.0 % Final    Lymph % 03/15/2023 29.6  18.0 - 48.0 % Final    Mono % 03/15/2023 8.0  4.0 - 15.0 % Final    Eosinophil % 03/15/2023 0.0  0.0 - 8.0 % Final    Basophil % 03/15/2023 0.1  0.0 - 1.9 % Final    Differential Method 03/15/2023 Automated   Final    Sodium 03/15/2023 138  136 - 145 mmol/L Final    Potassium 03/15/2023 3.6  3.5 - 5.1 mmol/L Final    Chloride 03/15/2023 109  95 - 110 mmol/L Final    CO2 03/15/2023 21 (L)  23 - 29 mmol/L Final    Glucose 03/15/2023 75  70 - 110 mg/dL Final    BUN 03/15/2023 5 (L)  6 - 20 mg/dL Final    Creatinine 03/15/2023 0.6  0.5 - 1.4 mg/dL Final    Calcium 03/15/2023 9.1  8.7 - 10.5 mg/dL Final    Total Protein 03/15/2023 6.4  6.0 - 8.4 g/dL Final    Albumin 03/15/2023 2.4 (L)  3.5 - 5.2 g/dL Final    Total Bilirubin 03/15/2023 0.9  0.1 - 1.0 mg/dL Final    Comment: For infants and newborns, interpretation of results should be based  on gestational age,  weight and in agreement with clinical  observations.    Premature Infant recommended reference ranges:  Up to 24 hours.............<8.0 mg/dL  Up to 48 hours............<12.0 mg/dL  3-5 days..................<15.0 mg/dL  6-29 days.................<15.0 mg/dL      Alkaline Phosphatase 03/15/2023 116  55 - 135 U/L Final    AST 03/15/2023 27  10 - 40 U/L Final    ALT 03/15/2023 29  10 - 44 U/L Final    Anion Gap 03/15/2023 8  8 - 16 mmol/L Final    eGFR 03/15/2023 >60  >60 mL/min/1.73 m^2 Final    Protein, Urine Random 03/15/2023 30 (H)  0 - 15 mg/dL Final    Creatinine, Urine 03/15/2023 317.2  15.0 - 325.0 mg/dL Final    Prot/Creat Ratio, Urine 03/15/2023 0.09  0.00 - 0.20 Final    Specimen UA 03/15/2023 Urine, Clean Catch   Final    Color, UA 03/15/2023 Orange (A)  Yellow, Straw, [place] Final    Appearance, UA 03/15/2023 Cloudy (A)  Clear Final    pH, UA 03/15/2023 6.0  5.0 - 8.0 Final    Specific Gravity, UA 03/15/2023 1.030  1.005 - 1.030 Final    Protein, UA 03/15/2023 1+ (A)  Negative Final    Comment: Recommend a 24 hour urine protein or a urine   protein/creatinine ratio if globulin induced proteinuria is  clinically suspected.      Glucose, UA 03/15/2023 Negative  Negative Final    Ketones, UA 03/15/2023 3+ (A)  Negative Final    Bilirubin (UA) 03/15/2023 1+ (A)  Negative Final    Comment: Positive urine bilirubin is not confirmed. Correlate with   serum bilirubin and clinical presentation.      Occult Blood UA 03/15/2023 Negative  Negative Final    Nitrite, UA 03/15/2023 Negative  Negative Final    Urobilinogen, UA 03/15/2023 4.0-6.0 (A)  <2.0 EU/dL Final    Leukocytes, UA 03/15/2023 3+ (A)  Negative Final    RPR 03/15/2023 Non-reactive  Non-reactive Final    HIV 1/2 Ag/Ab 03/15/2023 Negative  Negative Final    RBC, UA 03/15/2023 4  0 - 4 /hpf Final    WBC, UA 03/15/2023 26 (H)  0 - 5 /hpf Final    Bacteria 03/15/2023 Few (A)  None-Occ /hpf Final    Squam Epithel, UA 03/15/2023 58  /hpf Final    Hyaline Casts,  UA 03/15/2023 3 (A)  0-1/lpf /lpf Final    Microscopic Comment 03/15/2023 SEE COMMENT   Final    Comment: Other formed elements not mentioned in the report are not   present in the microscopic examination.       Urine Culture, Routine 03/15/2023 Multiple organisms isolated. None in predominance.  Repeat if   Final    Urine Culture, Routine 03/15/2023 clinically necessary.   Final        Past Medical History:   Diagnosis Date    ADHD, predominantly hyperactive-impulsive subtype     Asthma     Bipolar 1 disorder     Depression     Hypertension     Insomnia      History reviewed. No pertinent surgical history.    Current Outpatient Medications:     norethindrone (MICRONOR) 0.35 mg tablet, Take 1 tablet (0.35 mg total) by mouth once daily. (Patient not taking: Reported on 2023), Disp: 84 tablet, Rfl: 3  Review of patient's allergies indicates:   Allergen Reactions    Oranges [orange]      Per patient- other citrus products are okay    Penicillins     Lamotrigine Rash     Reported by mother on 18     OB History    Para Term  AB Living   1 1 1 0 0 1   SAB IAB Ectopic Multiple Live Births   0 0 0 0 1      # Outcome Date GA Lbr Asif/2nd Weight Sex Delivery Anes PTL Lv   1 Term 23 37w0d / 02:01 2.78 kg (6 lb 2.1 oz) F Vag-Spont EPI N BUTCH     Social History     Tobacco Use    Smoking status: Former    Smokeless tobacco: Never   Substance Use Topics    Alcohol use: No    Drug use: Not Currently     Types: Marijuana     Family History   Problem Relation Age of Onset    Diabetes Mother     Heart failure Mother     Breast cancer Neg Hx     Colon cancer Neg Hx     Ovarian cancer Neg Hx        Review of Systems   Negative except as in HPI      Physical Exam   Vitals:    23 1027   BP: 106/76     Body mass index is 59.03 kg/m².    Physical Exam  Constitutional:       General: She is not in acute distress.     Appearance: Normal appearance.   HENT:      Head: Normocephalic and atraumatic.    Pulmonary:      Effort: Pulmonary effort is normal.   Musculoskeletal:         General: Normal range of motion.      Cervical back: Normal range of motion.   Neurological:      General: No focal deficit present.      Mental Status: She is alert and oriented to person, place, and time.   Skin:     General: Skin is warm and dry.   Psychiatric:         Mood and Affect: Mood normal.         Behavior: Behavior normal.         Thought Content: Thought content normal.      Patient declines pelvic exam today.     Labs reviewed: CBC, P:C, T&S, CMP    ASSESSMENT:   Patient Active Problem List   Diagnosis    Depression    Bipolar 1 disorder    Acanthosis nigricans    Suspected sleep apnea    Screen for STD (sexually transmitted disease)    Pregnancy    Eczema    Morbid obesity    Chronic hypertension    History of chronic hypertension    HSV infection     (spontaneous vaginal delivery)    Encounter for postpartum visit       PLAN:  Problem List Items Addressed This Visit          Psychiatric    Depression       Cardiac/Vascular    History of chronic hypertension       Endocrine    Morbid obesity       Obstetric     (spontaneous vaginal delivery)    Encounter for postpartum visit - Primary    Current Assessment & Plan     Patient declined pelvic exam today, but denies abnormal bleeding, pain, or cramping. Breastfeeding. POPs for contraception. Counseled about risks of unintended pregnancy, safe pregnancy spacing, and continuing PNV. No signs of PP depression, EPDS score 2. Pap N/A. RTC 1 year for annual exam, sooner PRN.               Other Visit Diagnoses       Breast feeding status of mother        Relevant Orders    BREAST PUMP FOR HOME USE             Total time spent on this encounter was 20 minutes.  This includes preparing to see the patient;  obtaining/reviewing separately obtained history;  performing a medical exam and/or evaluation;   counseling/educating the patient;  ordering medications, tests, of  procedures;   referring/communicating with other health care professionals;  EMR documentation;  interpreting/communicating results to the patient;  and/or care coordination.    Follow up in about 1 year (around 5/5/2024) for Annual.       Ladonna Tsai MD  Department of Obstetrics & Gynecology  [facility]

## 2023-05-05 NOTE — ASSESSMENT & PLAN NOTE
Patient declined pelvic exam today, but denies abnormal bleeding, pain, or cramping. Breastfeeding. POPs for contraception. Counseled about risks of unintended pregnancy, safe pregnancy spacing, and continuing PNV. No signs of PP depression, EPDS score 2. Pap N/A. RTC 1 year for annual exam, sooner PRN.

## 2023-05-31 NOTE — LACTATION NOTE
LC did discharge lactation teaching and reviewed the Mother's Breastfeeding Guide and reviewed the breastfeeding community resources in the back of the breast feeding guide. LC answered all questions. Mother has  phone number  for questions after DC. Mother worried that baby is not always nursing well. She has a Medela pump at home and states she knows how to use it. Placed on plan below.    Plan for breastfeedin.Try to latch baby to breast. If no latch after 10-15 minutes then attempt hand expression. If mother thinks baby did not do a good job at the feeding then she can pump for extra stimulation. Pump 10-15 minutes and feed baby any EBM obtained.    2. If unable to obtain milk with hand expression/pumping then supplement with expressed milk or formula, which ever mother has on hand .Feed baby till content.    4.  If mother gets milk from expression she may give that to the baby with  a slow flow nipple using baby-led bottle feeding.    Continue this plan till MD states mother may stop and baby is in a gaining pattern.    
Lactation visited. Latch assistance given, Baby latched easily to the breast in football hold with swallows intermittently. Pt educated on positioning and latch, use of breast compression during the feeding, keeping the baby actively feeding. Breastfeeding basics reviewed. Pt encouraged to feed the baby 8 or more times in 24hrs on cue until content, no longer than 3 hrs between the feeding. Pt encouraged to call for breastfeeding assistance as needed.    03/26/23 1100   Maternal Assessment   Breast Shape Bilateral:;round;pendulous   Breast Density Bilateral:;soft   Areola Bilateral:;elastic   Nipples Bilateral:;everted   Maternal Infant Feeding   Maternal Emotional State relaxed;assist needed   Infant Positioning clutch/football   Signs of Milk Transfer audible swallow;infant jaw motion present   Pain with Feeding no   Latch Assistance yes   Community Referrals   Community Referrals outpatient lactation program;[facility] department       
no

## 2023-09-08 NOTE — ED PROVIDER NOTES
Encounter Date: 8/27/2022       History     Chief Complaint   Patient presents with    Cough     Productive with large amounts of sputum starting on Monday. Pregnant     22-year-old female with ADHD, asthma, bipolar disorder, hypertension, obesity who is 8 weeks pregnant presents for chest congestion for about 4 days.  She denies palliating factors, she was unsure if she could use over-the-counter medications for symptoms as she is pregnant.  She denies any other complaints, no cough, sinus congestion, shortness of breath, chest pain, wheezing or fever.    Review of patient's allergies indicates:   Allergen Reactions    Citrus and derivatives     Penicillins     Lamotrigine Rash     Reported by mother on 12/19/18     Past Medical History:   Diagnosis Date    ADHD, predominantly hyperactive-impulsive subtype     Asthma     Bipolar 1 disorder     Depression     Hypertension     Insomnia      No past surgical history on file.  Family History   Problem Relation Age of Onset    Diabetes Mother     Heart failure Mother     Breast cancer Neg Hx     Colon cancer Neg Hx     Ovarian cancer Neg Hx      Social History     Tobacco Use    Smoking status: Former    Smokeless tobacco: Never   Substance Use Topics    Alcohol use: No    Drug use: Not Currently     Types: Marijuana     Review of Systems   Constitutional:  Negative for fever.   HENT:  Positive for congestion. Negative for sore throat.    Respiratory:  Negative for apnea, cough, choking, chest tightness, shortness of breath, wheezing and stridor.    Cardiovascular:  Negative for chest pain.   Gastrointestinal:  Negative for nausea.   Genitourinary:  Negative for dysuria.   Musculoskeletal:  Negative for back pain.   Skin:  Negative for rash.   Neurological:  Negative for weakness.   Hematological:  Does not bruise/bleed easily.     Physical Exam     Initial Vitals [08/27/22 1059]   BP Pulse Resp Temp SpO2   124/85 85 16 97.7 °F (36.5 °C) 98 %      MAP       --      PROGRESS NOTE     Subjective     Jorge is a 78 year old here for Transitional Care Management     Review of Systems  {Use Notewriter Above or Select a ROS:[id number]::\"As documented above.\"}    Social Determinants Former Smoker Tobacco Pack Years 5.8    Objective {Show More Vitals:3}  There were no vitals filed for this visit.  Physical Exam  {Select Exam or use NoteWriter Above :[id number]}         ASSESSMENT AND PLAN  {(TIP) Problem List (29)  Medications (18)  History:3}     {There are no diagnoses linked to this encounter. (Refresh or delete this SmartLink)}    Follow Up  {Click Here to Navigate to Follow Up then  note:3}               Physical Exam    Nursing note and vitals reviewed.  Constitutional: She appears well-developed and well-nourished. She is Obese .   HENT:   Head: Normocephalic and atraumatic.   Nose: Nose normal. Right sinus exhibits no maxillary sinus tenderness and no frontal sinus tenderness. Left sinus exhibits no maxillary sinus tenderness and no frontal sinus tenderness.   Eyes: EOM are normal. Pupils are equal, round, and reactive to light.   Neck: Neck supple.   Normal range of motion.  Cardiovascular:  Normal rate, regular rhythm, normal heart sounds and intact distal pulses.     Exam reveals no gallop and no friction rub.       No murmur heard.  Pulmonary/Chest: Breath sounds normal. No respiratory distress. She has no wheezes. She has no rhonchi. She has no rales. She exhibits no tenderness.   Musculoskeletal:         General: Normal range of motion.      Cervical back: Normal range of motion and neck supple.     Neurological: She is alert and oriented to person, place, and time.   Skin: Skin is warm and dry.   Psychiatric: She has a normal mood and affect.       ED Course   Procedures  Labs Reviewed   SARS-COV-2 RNA AMPLIFICATION, QUAL          Imaging Results    None          Medications   cetirizine tablet 10 mg (10 mg Oral Given 8/27/22 1124)     Medical Decision Making:   History:   Old Medical Records: I decided to obtain old medical records.  Old Records Summarized: records from previous admission(s).       <> Summary of Records: Most recent ED visit in June for tonsillitis  Initial Assessment:   22-year-old female presenting for sensation of mucus in her chest.  Her vitals are normal and she is well-appearing.  Differential Diagnosis:   Allergies   COVID-19   Viral syndrome   She has no shortness of breath or wheezing to suggest asthma exacerbation   No fever, chest pain or shortness of breath to suggest pneumonia  Clinical Tests:   Lab Tests: Ordered and Reviewed  ED Management:  Will give Zyrtec, swab for  COVID-19 and reassess.    Patient ate a cough drop while she was in the ED and states that she feels better.  Will discharge with Zyrtec and instructions to follow up with PCP return to the ED for worsening symptoms. Stressed the importance of follow-up, strict ED return precautions given.  Patient voiced understanding and is comfortable with discharge. I discussed this patient with my supervising physician.                      Clinical Impression:   Final diagnoses:  [R09.89] Chest congestion (Primary)      ED Disposition Condition    Discharge Stable          ED Prescriptions       Medication Sig Dispense Start Date End Date Auth. Provider    cetirizine (ZYRTEC) 10 MG tablet Take 1 tablet (10 mg total) by mouth daily as needed (Congestion). 7 tablet 8/27/2022 8/27/2023 Hannah Neff PA-C          Follow-up Information       Follow up With Specialties Details Why Contact [facility] - Family Medicine Family Medicine Schedule an appointment as soon as possible for a visit in 1 week  [address] [facility] [address]  [phone]      [facility] Emergency Medicine Go to  If symptoms worsen [id number] [facility] [id number] [phone]             Hannah Neff PA-C  08/27/22 7420

## 2023-11-10 NOTE — TELEPHONE ENCOUNTER
----- Message from Gladys Bhakta sent at 11/29/2022 10:35 AM CST -----  Name of Who is Calling: SHAYNE VANCE [[id number]]            What is the request in detail: Patient is requesting call back to get medicine although no pharmacy was attached for aspirin (ECOTRIN) 81 MG EC tablet        [id number] - [place], [place] - [address] [[id number]]      Can the clinic reply by [facility]: no              What Number to Call Back if not in [facility]: [phone]         69M acute on chronic shoulder pain. Will x-ray and treat pain, anticipate d/c w/ ortho OP.

## 2024-03-06 ENCOUNTER — TELEPHONE (OUTPATIENT)
Dept: OBSTETRICS AND GYNECOLOGY | Facility: CLINIC | Age: 24
End: 2024-03-06
Payer: MEDICAID

## 2024-03-06 NOTE — TELEPHONE ENCOUNTER
Rtn call to pt in regards to appt,  she could not make until after 3pm no available appts.   Pt  Scheduled for next available. Added to wait list       Natalie VERA LPN  OB/GYN

## 2024-03-06 NOTE — TELEPHONE ENCOUNTER
----- Message from Lesley Hubbard sent at 3/6/2024 10:31 AM CST -----  Contact: Pt  .Type:  Sooner Apoointment Request    Caller is requesting a sooner appointment.  Caller declined first available appointment listed below.  Caller will not accept being placed on the waitlist and is requesting a message be sent to doctor.  Name of Caller: pt  When is the first available appointment?   Symptoms: annual / birth control   Would the patient rather a call back or a response via [facility]? phone  Best Call Back Number:  [phone]  Additional Information:  couldn't get off of work

## 2025-01-16 ENCOUNTER — TELEPHONE (OUTPATIENT)
Dept: OBSTETRICS AND GYNECOLOGY | Facility: CLINIC | Age: 25
End: 2025-01-16
Payer: MEDICAID

## 2025-01-16 NOTE — TELEPHONE ENCOUNTER
Contacted pt to get scheduled for navigation visit prior to preg confirmation appt that was self scheduled. No answer, sent Qu[employer]b message.

## 2025-01-17 ENCOUNTER — TELEPHONE (OUTPATIENT)
Dept: OBSTETRICS AND GYNECOLOGY | Facility: CLINIC | Age: 25
End: 2025-01-17
Payer: MEDICAID

## 2025-01-17 NOTE — TELEPHONE ENCOUNTER
----- Message from Fritz Stewart sent at 1/17/2025  1:05 PM CST -----  Contact: Kaylie  Type:  Patient Returning Call    Who Called: Kaylie  Who Left Message for Patient: TerraShelly Melissa  Does the patient know what this is regarding?: Pregnancy Navigation appt  Would the patient rather a call back or a response via [facility]?  Call or Message  Best Call Back Number: [phone]  Additional Information:  
Returned pt call confirmed pt identifiers scheduled pt for navigation visit for 1/21 1040 and to keep f/u appt on 1/22 with provider. Pt voiced understanding  
Detail Level: Simple

## 2025-01-21 ENCOUNTER — PATIENT MESSAGE (OUTPATIENT)
Dept: OBSTETRICS AND GYNECOLOGY | Facility: CLINIC | Age: 25
End: 2025-01-21
Payer: MEDICAID

## 2025-04-25 ENCOUNTER — PATIENT MESSAGE (OUTPATIENT)
Dept: OBSTETRICS AND GYNECOLOGY | Facility: CLINIC | Age: 25
End: 2025-04-25
Payer: MEDICAID

## 2025-04-28 ENCOUNTER — TELEPHONE (OUTPATIENT)
Dept: OBSTETRICS AND GYNECOLOGY | Facility: CLINIC | Age: 25
End: 2025-04-28
Payer: MEDICAID

## 2025-04-28 NOTE — TELEPHONE ENCOUNTER
----- Message from Dari sent at 4/28/2025  8:51 AM CDT -----  Kaylie Dangelo to P [facility] [address] Clinical Support Staff (supporting Reza Acevedo MA) (Selected Message)  4/26/25  9:37 AMGoodmorning my phone for some reason won't let me fill the paperwork out but I was seen once at [facility] The doctor name is Ms Ferraro. I'm also 21 weeks and 4 daysNixon, TRICIA Cobb to Kaylie Pandya  4/25/25 10:03 AMGood Morning Kaylie,  I tried to reach you at the [phone] number but the calls did not go through. I see you are scheduled to see Dr. Tsai on 5/9 but before you can be seen by her I have to get you scheduled with our OB navigator to find out how far along you are. Also, if you have been having prenatal visits somewhere else please fill out the attached form and send it back to me at your earliest convenience.  Thanks, Reza

## 2025-04-29 ENCOUNTER — TELEPHONE (OUTPATIENT)
Dept: OBSTETRICS AND GYNECOLOGY | Facility: CLINIC | Age: 25
End: 2025-04-29
Payer: MEDICAID

## 2025-04-29 NOTE — TELEPHONE ENCOUNTER
----- Message from Dari sent at 4/28/2025  8:51 AM CDT -----  Kaylie Dangelo to P [facility] Ob[facility] [address] Clinical Support Staff (supporting Reza Acevedo MA) (Selected Message)  4/26/25  9:37 AMGoodmorning my phone for some reason won't let me fill the paperwork out but I was seen once at [facility] The doctor name is Ms Ferraro. I'm also 21 weeks and 4 daysNixon, TRICIA Cobb to Kaylie Pandya  4/25/25 10:03 AMGood Morning Kaylie,  I tried to reach you at the [phone] number but the calls did not go through. I see you are scheduled to see Dr. Tsai on 5/9 but before you can be seen by her I have to get you scheduled with our OB navigator to find out how far along you are. Also, if you have been having prenatal visits somewhere else please fill out the attached form and send it back to me at your earliest convenience.  Thanks, Reza

## 2025-05-01 ENCOUNTER — PATIENT MESSAGE (OUTPATIENT)
Dept: PSYCHIATRY | Facility: CLINIC | Age: 25
End: 2025-05-01
Payer: MEDICAID

## 2025-05-01 ENCOUNTER — INITIAL PRENATAL (OUTPATIENT)
Dept: OBSTETRICS AND GYNECOLOGY | Facility: CLINIC | Age: 25
End: 2025-05-01
Payer: MEDICAID

## 2025-05-01 VITALS — WEIGHT: 293 LBS | DIASTOLIC BLOOD PRESSURE: 78 MMHG | BODY MASS INDEX: 53.59 KG/M2 | SYSTOLIC BLOOD PRESSURE: 112 MMHG

## 2025-05-01 DIAGNOSIS — R45.86 MOOD CHANGES: ICD-10-CM

## 2025-05-01 DIAGNOSIS — Z3A.22 22 WEEKS GESTATION OF PREGNANCY: Primary | ICD-10-CM

## 2025-05-01 DIAGNOSIS — Z34.81 ENCOUNTER FOR SUPERVISION OF NORMAL PREGNANCY IN MULTIGRAVIDA IN FIRST TRIMESTER: ICD-10-CM

## 2025-05-01 PROCEDURE — 99999 PR PBB SHADOW E&M-EST. PATIENT-LVL III: CPT | Mod: PBBFAC,,,

## 2025-05-01 PROCEDURE — 87086 URINE CULTURE/COLONY COUNT: CPT

## 2025-05-01 PROCEDURE — 99213 OFFICE O/P EST LOW 20 MIN: CPT | Mod: PBBFAC,TH

## 2025-05-01 RX ORDER — ASPIRIN 81 MG/1
81 TABLET ORAL DAILY
Qty: 90 TABLET | Refills: 3 | Status: SHIPPED | OUTPATIENT
Start: 2025-05-01 | End: 2026-05-01

## 2025-05-01 NOTE — PATIENT INSTRUCTIONS
LABOR AND DELIVERY PHONE NUMBER, [phone] (OPEN 24/7, LOCATED ON 6TH FLOOR OF HOSPITAL)  [address] PHONE NUMBER, [phone] (OPEN MON-FRI, 8a-5p)

## 2025-05-01 NOTE — PROGRESS NOTES
Reason for visit: Initial Prenatal Visit      HPI:   24 y.o., at 22w1d by Estimated Date of Delivery: 9/3/25  She is transfer of care. Was seen in [place] for a bit. This is her second pregnancy. Does report pre.E with first.  x1. Does report recent mood swings/ changes.     - Contractions: none   - Bleeding: none   - Loss of fluid: none   - Fetal movement: yes  - Nausea: none   - Vomiting: none   - Headache: none     Reviewed:    Past medical, surgical, social, family, and obstetric history: Reviewed and updated in EMR.  Medications: Reviewed and updated in EMR.  Allergies: Oranges [orange], Penicillins, and Lamotrigine    Pregnancy dating, labs, ultrasound reports, prenatal testing, and problem list: Reviewed and updated in EMR.  Outside records: n/a  Independent interpretation of tests: n/a  Discussion with another healthcare professional: n/a      Vitals: /78   Wt 132.9 kg (292 lb 15.9 oz)   LMP 2024 (Approximate)   BMI 53.59 kg/m²     Physical exam:  GENERAL: No acute distress  ABD: Gravid      Assessment and Plan:    22 weeks gestation of pregnancy  -     aspirin (ECOTRIN) 81 MG EC tablet; Take 1 tablet (81 mg total) by mouth once daily.  Dispense: 90 tablet; Refill: 3  -     prenatal vit 87-iron-folic-dha (PRENATE MINI, FERR ASP GLYCIN,) 18-1-350 mg Cap; Take 1 tablet by mouth once daily. for 270 doses  Dispense: 90 capsule; Refill: 3  -     US MFM Procedure (Viewpoint); Future  -     CBC Auto Differential; Future; Expected date: 2025  -     OB Glucose Screen; Future; Expected date: 2025  -     Urine Culture High Risk  -     Ambulatory referral/consult to  Behavioral Health; Future; Expected date: 2025    Encounter for supervision of normal pregnancy in multigravida in first trimester  -     aspirin (ECOTRIN) 81 MG EC tablet; Take 1 tablet (81 mg total) by mouth once daily.  Dispense: 90 tablet; Refill: 3  -     prenatal vit 87-iron-folic-dha (PRENATE MINI, FERR ASP  GLYCIN,) 18-1-350 mg Cap; Take 1 tablet by mouth once daily. for 270 doses  Dispense: 90 capsule; Refill: 3  -     US MFM Procedure (Viewpoint); Future  -     CBC Auto Differential; Future; Expected date: 2025  -     OB Glucose Screen; Future; Expected date: 2025  -     Urine Culture High Risk    Mood changes  -     Ambulatory referral/consult to  Behavioral Health; Future; Expected date: 2025      Discussed baby ASA  Anatomy scan ordered   CBC and glucose ordered for next visit, added baseline pre.E labs as well  Referral to  health   Discussed options for aneuploidy screening, declines today     labor precautions given  Follow-up: 4 weeks with Dr. Tsai       I spent a total of 20 minutes on the day of the visit. This includes face to face time and non-face to face time preparing to see the patient (eg, review of tests), Obtaining and/or reviewing separately obtained history, Documenting clinical information in the electronic or other health record, Independently interpreting results and communicating results to the patient/family/caregiver, or Care coordination.

## 2025-05-02 ENCOUNTER — TELEPHONE (OUTPATIENT)
Dept: PSYCHIATRY | Facility: CLINIC | Age: 25
End: 2025-05-02
Payer: MEDICAID

## 2025-05-02 NOTE — TELEPHONE ENCOUNTER
Behavioral Health [facility]  Initial Assessment  Completed by: Marissa Chery      Date: 2025     Patient Enrollment in  Behavioral Health Program:   OB/GYN referred: Yes, [facility]    Assessments     EPDS- 9 Score:     History     Pregnancy Status: 22 weeks  Breastfeeding? N/A    Pt states she has been having a lot of emotions. Pt states she is going through a breakup and having a hard time processing her emotions. Pt took medicine for mental health in the past but could not recall which one PT has a hard time articulating what she wanted support with and what she was feeling.     Call Summary     Pt interested in program and added to program.   Follow Up     Next appointment date: 25

## 2025-05-03 LAB — BACTERIA UR CULT: NORMAL

## 2025-05-05 ENCOUNTER — RESULTS FOLLOW-UP (OUTPATIENT)
Dept: OBSTETRICS AND GYNECOLOGY | Facility: CLINIC | Age: 25
End: 2025-05-05

## 2025-05-06 ENCOUNTER — PROCEDURE VISIT (OUTPATIENT)
Dept: MATERNAL FETAL MEDICINE | Facility: CLINIC | Age: 25
End: 2025-05-06
Payer: MEDICAID

## 2025-05-06 DIAGNOSIS — Z3A.22 22 WEEKS GESTATION OF PREGNANCY: ICD-10-CM

## 2025-05-06 DIAGNOSIS — Z36.2 ENCOUNTER FOR FOLLOW-UP ULTRASOUND OF FETAL ANATOMY: Primary | ICD-10-CM

## 2025-05-06 DIAGNOSIS — Z34.81 ENCOUNTER FOR SUPERVISION OF NORMAL PREGNANCY IN MULTIGRAVIDA IN FIRST TRIMESTER: ICD-10-CM

## 2025-05-06 PROCEDURE — 76811 OB US DETAILED SNGL FETUS: CPT | Mod: PBBFAC | Performed by: OBSTETRICS & GYNECOLOGY

## 2025-05-14 ENCOUNTER — PATIENT MESSAGE (OUTPATIENT)
Dept: OTHER | Facility: OTHER | Age: 25
End: 2025-05-14
Payer: MEDICAID

## 2025-05-20 ENCOUNTER — OFFICE VISIT (OUTPATIENT)
Dept: PSYCHIATRY | Facility: CLINIC | Age: 25
End: 2025-05-20
Payer: MEDICAID

## 2025-05-20 DIAGNOSIS — R45.86 MOOD CHANGES: ICD-10-CM

## 2025-05-20 DIAGNOSIS — O99.340 ANXIETY DISORDER AFFECTING PREGNANCY, ANTEPARTUM: Primary | ICD-10-CM

## 2025-05-20 DIAGNOSIS — F41.9 ANXIETY: ICD-10-CM

## 2025-05-20 DIAGNOSIS — F41.9 ANXIETY DISORDER AFFECTING PREGNANCY, ANTEPARTUM: Primary | ICD-10-CM

## 2025-05-20 PROCEDURE — 90791 PSYCH DIAGNOSTIC EVALUATION: CPT | Mod: 95,,, | Performed by: SOCIAL WORKER
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
## 2025-05-28 ENCOUNTER — PATIENT MESSAGE (OUTPATIENT)
Dept: OTHER | Facility: OTHER | Age: 25
End: 2025-05-28
Payer: MEDICAID

## 2025-05-30 NOTE — TELEPHONE ENCOUNTER
----- Message from Jason Chery sent at 12/7/2022 12:14 PM CST -----  Regarding: Appt  Contact: HSAYNE VANCE [[id number]]  Name of Who is Calling: SHAYNE VANCE [[id number]]      What is the request in detail: Would like to speak with staff in regards to possibly coming in early. Please advise      Can the clinic reply by [facility]: no      What Number to Call Back if not in [facility]: [phone]                                          Yes

## 2025-06-02 ENCOUNTER — ROUTINE PRENATAL (OUTPATIENT)
Dept: OBSTETRICS AND GYNECOLOGY | Facility: CLINIC | Age: 25
End: 2025-06-02
Payer: MEDICAID

## 2025-06-02 ENCOUNTER — PROCEDURE VISIT (OUTPATIENT)
Dept: MATERNAL FETAL MEDICINE | Facility: CLINIC | Age: 25
End: 2025-06-02
Payer: MEDICAID

## 2025-06-02 ENCOUNTER — PATIENT MESSAGE (OUTPATIENT)
Dept: MATERNAL FETAL MEDICINE | Facility: CLINIC | Age: 25
End: 2025-06-02

## 2025-06-02 VITALS — DIASTOLIC BLOOD PRESSURE: 80 MMHG | BODY MASS INDEX: 54.03 KG/M2 | WEIGHT: 293 LBS | SYSTOLIC BLOOD PRESSURE: 116 MMHG

## 2025-06-02 DIAGNOSIS — F39 MOOD DISORDER: ICD-10-CM

## 2025-06-02 DIAGNOSIS — O99.210 OBESITY IN PREGNANCY, ANTEPARTUM: ICD-10-CM

## 2025-06-02 DIAGNOSIS — Z3A.26 26 WEEKS GESTATION OF PREGNANCY: ICD-10-CM

## 2025-06-02 DIAGNOSIS — Z36.2 ENCOUNTER FOR FOLLOW-UP ULTRASOUND OF FETAL ANATOMY: ICD-10-CM

## 2025-06-02 DIAGNOSIS — Z36.89 ENCOUNTER FOR ULTRASOUND TO ASSESS FETAL GROWTH: Primary | ICD-10-CM

## 2025-06-02 DIAGNOSIS — I10 CHRONIC HYPERTENSION: ICD-10-CM

## 2025-06-02 DIAGNOSIS — O99.012 ANEMIA DURING PREGNANCY IN SECOND TRIMESTER: ICD-10-CM

## 2025-06-02 DIAGNOSIS — O09.92 ENCOUNTER FOR SUPERVISION OF HIGH RISK PREGNANCY IN SECOND TRIMESTER, ANTEPARTUM: Primary | ICD-10-CM

## 2025-06-02 PROCEDURE — 99214 OFFICE O/P EST MOD 30 MIN: CPT | Mod: TH,S$PBB,, | Performed by: OBSTETRICS & GYNECOLOGY

## 2025-06-02 PROCEDURE — 99999 PR PBB SHADOW E&M-EST. PATIENT-LVL II: CPT | Mod: PBBFAC,,, | Performed by: OBSTETRICS & GYNECOLOGY

## 2025-06-02 PROCEDURE — 99212 OFFICE O/P EST SF 10 MIN: CPT | Mod: PBBFAC,25,TH | Performed by: OBSTETRICS & GYNECOLOGY

## 2025-06-02 PROCEDURE — 76816 OB US FOLLOW-UP PER FETUS: CPT | Mod: PBBFAC | Performed by: OBSTETRICS & GYNECOLOGY

## 2025-06-02 RX ORDER — AMOXICILLIN AND CLAVULANATE POTASSIUM 875; 125 MG/1; MG/1
1 TABLET, FILM COATED ORAL
COMMUNITY
Start: 2025-05-29 | End: 2025-06-05

## 2025-06-02 NOTE — Clinical Note
Andreas Corrales, I'm Ladonna Tsai, one of the OBGYNs at [facility]. This is an OB patient of mine who is looking to transfer care to an OB in [place]. I would love to continue caring for her here in [place] but I will be out on maternity leave when she is due, and she and her partner are actually living in [place] and are looking for a provider there. Before me she had seen Dr. Ferraro at [facility] but left their practice for a short while to be in [place], however they recently moved back to [place] and plan to be here for the duration of her pregnancy and delivery. She is very sweet, does have a history of mood disorder and CHTN on no meds, prior  in  (IOL at 36 weeks for non-reassuring fetal status on  testing). Would you be willing to see her for the rest of her pregnancy? Please let me know, I am also happy to chat by phone about her if you'd like. [phone]. Thanks!

## 2025-06-02 NOTE — PROGRESS NOTES
@26w5d: Doing well, denies CTX, LOF, VB. +FM.   Transfer of care first from [facility] in [place] (saw Boby Ferraro), then moved to [place], now back in [place] but living closer to . Would like to deliver with me at [facility], but explained to patient I will be out on maternity leave for her REGINA.   Will consider sending to Savanna Gibbs at [facility] in .   Prior pregnancy history: She is s/p  3/25/23, IOL at 36w6d for NR fetal status during NST. Delivered a VFI (Ameya) 6#2oz, Apgars 8/9. Repair of midline vaginal laceration. Documented history of CHTN but no abnormal pressures during the pregnancy. Uncomplicated PP course.  CHTN - no medications. Baseline labs normal and P:C WNL.   Start PNT at 32 weeks.   Growth scan done today, repeat in 4 weeks per [facility], report pending.   Started having Benji Koenig around 23 weeks. Discussed increased PO hydration and Tylenol and warm baths PRN.   Aching pelvic pains that radiates to legs.   Working at [employer].   BOY!! Girl at home, Ameya.   Care significantly limited by social determinants of health.  Mood disorder - not currently on any medications. Denies depression/anhedonia, SI/HI. Will continue to monitor.   RTC 4 weeks OB f/u. PTL/PPROM/PreE/FM precautions reviewed.

## 2025-06-03 ENCOUNTER — LAB VISIT (OUTPATIENT)
Dept: LAB | Facility: OTHER | Age: 25
End: 2025-06-03
Attending: OBSTETRICS & GYNECOLOGY
Payer: MEDICAID

## 2025-06-03 DIAGNOSIS — Z3A.22 22 WEEKS GESTATION OF PREGNANCY: ICD-10-CM

## 2025-06-03 DIAGNOSIS — Z34.81 ENCOUNTER FOR SUPERVISION OF NORMAL PREGNANCY IN MULTIGRAVIDA IN FIRST TRIMESTER: ICD-10-CM

## 2025-06-03 LAB
ABSOLUTE EOSINOPHIL (OHS): 0.03 K/UL
ABSOLUTE MONOCYTE (OHS): 0.53 K/UL (ref 0.3–1)
ABSOLUTE NEUTROPHIL COUNT (OHS): 3.89 K/UL (ref 1.8–7.7)
ALBUMIN SERPL BCP-MCNC: 2.5 G/DL (ref 3.5–5.2)
ALP SERPL-CCNC: 69 UNIT/L (ref 40–150)
ALT SERPL W/O P-5'-P-CCNC: 14 UNIT/L (ref 10–44)
ANION GAP (OHS): 8 MMOL/L (ref 8–16)
AST SERPL-CCNC: 15 UNIT/L (ref 11–45)
BASOPHILS # BLD AUTO: 0.01 K/UL
BASOPHILS NFR BLD AUTO: 0.1 %
BILIRUB SERPL-MCNC: 0.3 MG/DL (ref 0.1–1)
BUN SERPL-MCNC: 8 MG/DL (ref 6–20)
CALCIUM SERPL-MCNC: 8.5 MG/DL (ref 8.7–10.5)
CHLORIDE SERPL-SCNC: 109 MMOL/L (ref 95–110)
CO2 SERPL-SCNC: 21 MMOL/L (ref 23–29)
CREAT SERPL-MCNC: 0.6 MG/DL (ref 0.5–1.4)
ERYTHROCYTE [DISTWIDTH] IN BLOOD BY AUTOMATED COUNT: 14.2 % (ref 11.5–14.5)
GFR SERPLBLD CREATININE-BSD FMLA CKD-EPI: >60 ML/MIN/1.73/M2
GLUCOSE SERPL-MCNC: 77 MG/DL (ref 70–140)
GLUCOSE SERPL-MCNC: 80 MG/DL (ref 70–110)
HCT VFR BLD AUTO: 30 % (ref 37–48.5)
HGB BLD-MCNC: 9.6 GM/DL (ref 12–16)
IMM GRANULOCYTES # BLD AUTO: 0.03 K/UL (ref 0–0.04)
IMM GRANULOCYTES NFR BLD AUTO: 0.4 % (ref 0–0.5)
LYMPHOCYTES # BLD AUTO: 2.26 K/UL (ref 1–4.8)
MCH RBC QN AUTO: 26 PG (ref 27–31)
MCHC RBC AUTO-ENTMCNC: 32 G/DL (ref 32–36)
MCV RBC AUTO: 81 FL (ref 82–98)
NUCLEATED RBC (/100WBC) (OHS): 0 /100 WBC
PLATELET # BLD AUTO: 301 K/UL (ref 150–450)
PMV BLD AUTO: 10.5 FL (ref 9.2–12.9)
POTASSIUM SERPL-SCNC: 3.6 MMOL/L (ref 3.5–5.1)
PROT SERPL-MCNC: 6.8 GM/DL (ref 6–8.4)
RBC # BLD AUTO: 3.69 M/UL (ref 4–5.4)
RELATIVE EOSINOPHIL (OHS): 0.4 %
RELATIVE LYMPHOCYTE (OHS): 33.5 % (ref 18–48)
RELATIVE MONOCYTE (OHS): 7.9 % (ref 4–15)
RELATIVE NEUTROPHIL (OHS): 57.7 % (ref 38–73)
SODIUM SERPL-SCNC: 138 MMOL/L (ref 136–145)
WBC # BLD AUTO: 6.75 K/UL (ref 3.9–12.7)

## 2025-06-03 PROCEDURE — 36415 COLL VENOUS BLD VENIPUNCTURE: CPT

## 2025-06-03 PROCEDURE — 84075 ASSAY ALKALINE PHOSPHATASE: CPT

## 2025-06-03 PROCEDURE — 82950 GLUCOSE TEST: CPT

## 2025-06-03 PROCEDURE — 85025 COMPLETE CBC W/AUTO DIFF WBC: CPT

## 2025-06-04 ENCOUNTER — RESULTS FOLLOW-UP (OUTPATIENT)
Dept: OBSTETRICS AND GYNECOLOGY | Facility: CLINIC | Age: 25
End: 2025-06-04

## 2025-06-04 DIAGNOSIS — O99.012 ANEMIA DURING PREGNANCY IN SECOND TRIMESTER: Primary | ICD-10-CM

## 2025-06-04 RX ORDER — FERROUS SULFATE 325(65) MG
325 TABLET ORAL DAILY
Qty: 90 TABLET | Refills: 3 | Status: SHIPPED | OUTPATIENT
Start: 2025-06-04 | End: 2026-06-04

## 2025-06-05 ENCOUNTER — TELEPHONE (OUTPATIENT)
Dept: PSYCHIATRY | Facility: CLINIC | Age: 25
End: 2025-06-05
Payer: MEDICAID

## 2025-06-08 ENCOUNTER — RESULTS FOLLOW-UP (OUTPATIENT)
Dept: OBSTETRICS AND GYNECOLOGY | Facility: HOSPITAL | Age: 25
End: 2025-06-08

## 2025-06-09 ENCOUNTER — PATIENT MESSAGE (OUTPATIENT)
Dept: PSYCHIATRY | Facility: CLINIC | Age: 25
End: 2025-06-09
Payer: MEDICAID

## 2025-06-11 ENCOUNTER — PATIENT MESSAGE (OUTPATIENT)
Dept: OTHER | Facility: OTHER | Age: 25
End: 2025-06-11
Payer: MEDICAID

## 2025-06-13 PROBLEM — O99.210 OBESITY IN PREGNANCY, ANTEPARTUM: Status: ACTIVE | Noted: 2025-06-13

## 2025-06-13 PROBLEM — O99.012 ANEMIA DURING PREGNANCY IN SECOND TRIMESTER: Status: ACTIVE | Noted: 2025-06-13

## 2025-06-17 ENCOUNTER — TELEPHONE (OUTPATIENT)
Dept: OBSTETRICS AND GYNECOLOGY | Facility: CLINIC | Age: 25
End: 2025-06-17
Payer: MEDICAID

## 2025-06-17 NOTE — TELEPHONE ENCOUNTER
Copied from CRM #[id number]. Topic: General Inquiry - Return Call  >> Jun 17, 2025  2:54 PM Jackie wrote:  Type:  Patient Returning Call    Who Called:Kaylie Dangelo   Who Left Message for Patient:Melissa Cao LPN  Does the patient know what this is regarding?:schedule an appt with   Would the patient rather a call back or a response via [facility]? Call back  Best Call Back Number:[phone]  Thanks!

## 2025-06-17 NOTE — TELEPHONE ENCOUNTER
Copied from CRM #[id number]. Topic: Appointments - Appointment Access  >> Jun 13, 2025  4:35 PM Jackie wrote:  Type:  Schedule first OB Apoointment Request    Name of Caller:Kaylie Miri Dangelo   She is 28 weeks of pregnant, asking if is possible to be seen by   Would the patient rather a call back or a response via [facility]? Call back  Best Call Back Number:[phone]  Thanks!

## 2025-06-17 NOTE — TELEPHONE ENCOUNTER
Copied from CRM #[id number]. Topic: General Inquiry - Return Call  >> Jun 17, 2025  2:54 PM Jackie wrote:  Type:  Patient Returning Call    Who Called:Kaylie Dangelo   Who Left Message for Patient:Melissa Cao LPN  Does the patient know what this is regarding?:schedule an appt with   Would the patient rather a call back or a response via [facility]? Call back  Best Call Back Number:[phone]  Thanks!

## 2025-06-17 NOTE — TELEPHONE ENCOUNTER
Called patient and she has been travelling to [facility] from  to see Dr. Ladonna Tsai.  Dr. Tsai will be out on maternity leave herself and advised patient it would be a good idea to find a  doctor.  Patient stated she had preeclampsia with last pregnancy and is seeing [facility] for that.    Appointment scheduled for Dr. Gibbs to do a consult.

## 2025-06-25 ENCOUNTER — PATIENT MESSAGE (OUTPATIENT)
Dept: OTHER | Facility: OTHER | Age: 25
End: 2025-06-25
Payer: MEDICAID

## 2025-06-25 ENCOUNTER — PATIENT MESSAGE (OUTPATIENT)
Dept: OBSTETRICS AND GYNECOLOGY | Facility: CLINIC | Age: 25
End: 2025-06-25

## 2025-06-25 ENCOUNTER — INITIAL PRENATAL (OUTPATIENT)
Dept: OBSTETRICS AND GYNECOLOGY | Facility: CLINIC | Age: 25
End: 2025-06-25
Payer: MEDICAID

## 2025-06-25 VITALS — DIASTOLIC BLOOD PRESSURE: 78 MMHG | WEIGHT: 293 LBS | BODY MASS INDEX: 54.92 KG/M2 | SYSTOLIC BLOOD PRESSURE: 116 MMHG

## 2025-06-25 DIAGNOSIS — O10.919 HTN IN PREGNANCY, CHRONIC: Primary | ICD-10-CM

## 2025-06-25 DIAGNOSIS — O99.012 ANEMIA DURING PREGNANCY IN SECOND TRIMESTER: ICD-10-CM

## 2025-06-25 DIAGNOSIS — I10 CHRONIC HYPERTENSION: ICD-10-CM

## 2025-06-25 DIAGNOSIS — F31.9 BIPOLAR 1 DISORDER: ICD-10-CM

## 2025-06-25 PROCEDURE — 99999 PR PBB SHADOW E&M-EST. PATIENT-LVL II: CPT | Mod: PBBFAC,,, | Performed by: STUDENT IN AN ORGANIZED HEALTH CARE EDUCATION/TRAINING PROGRAM

## 2025-06-25 PROCEDURE — 99212 OFFICE O/P EST SF 10 MIN: CPT | Mod: PBBFAC,TH | Performed by: STUDENT IN AN ORGANIZED HEALTH CARE EDUCATION/TRAINING PROGRAM

## 2025-06-25 RX ORDER — FERROUS SULFATE 325(65) MG
325 TABLET ORAL DAILY
Qty: 90 TABLET | Refills: 3 | Status: SHIPPED | OUTPATIENT
Start: 2025-06-25 | End: 2026-06-25

## 2025-06-27 ENCOUNTER — PROCEDURE VISIT (OUTPATIENT)
Dept: OBSTETRICS AND GYNECOLOGY | Facility: CLINIC | Age: 25
End: 2025-06-27
Payer: MEDICAID

## 2025-06-27 DIAGNOSIS — O10.919 HTN IN PREGNANCY, CHRONIC: ICD-10-CM

## 2025-06-27 PROCEDURE — 76816 OB US FOLLOW-UP PER FETUS: CPT | Mod: PBBFAC | Performed by: OBSTETRICS & GYNECOLOGY

## 2025-07-09 ENCOUNTER — PATIENT MESSAGE (OUTPATIENT)
Dept: OTHER | Facility: OTHER | Age: 25
End: 2025-07-09
Payer: MEDICAID

## 2025-07-10 ENCOUNTER — LAB VISIT (OUTPATIENT)
Dept: LAB | Facility: HOSPITAL | Age: 25
End: 2025-07-10
Attending: MIDWIFE
Payer: MEDICAID

## 2025-07-10 ENCOUNTER — ROUTINE PRENATAL (OUTPATIENT)
Dept: OBSTETRICS AND GYNECOLOGY | Facility: CLINIC | Age: 25
End: 2025-07-10
Payer: MEDICAID

## 2025-07-10 ENCOUNTER — TELEPHONE (OUTPATIENT)
Dept: OBSTETRICS AND GYNECOLOGY | Facility: CLINIC | Age: 25
End: 2025-07-10

## 2025-07-10 VITALS — WEIGHT: 293 LBS | DIASTOLIC BLOOD PRESSURE: 64 MMHG | BODY MASS INDEX: 56.85 KG/M2 | SYSTOLIC BLOOD PRESSURE: 144 MMHG

## 2025-07-10 DIAGNOSIS — I10 CHRONIC HYPERTENSION: ICD-10-CM

## 2025-07-10 DIAGNOSIS — Z3A.32 32 WEEKS GESTATION OF PREGNANCY: Primary | ICD-10-CM

## 2025-07-10 DIAGNOSIS — B00.9 HSV INFECTION: ICD-10-CM

## 2025-07-10 DIAGNOSIS — Z3A.32 32 WEEKS GESTATION OF PREGNANCY: ICD-10-CM

## 2025-07-10 PROBLEM — O99.210 OBESITY IN PREGNANCY, ANTEPARTUM: Status: RESOLVED | Noted: 2025-06-13 | Resolved: 2025-07-10

## 2025-07-10 PROBLEM — E66.01 MORBID OBESITY: Status: RESOLVED | Noted: 2022-11-23 | Resolved: 2025-07-10

## 2025-07-10 PROBLEM — Z86.79 HISTORY OF CHRONIC HYPERTENSION: Status: RESOLVED | Noted: 2023-03-10 | Resolved: 2025-07-10

## 2025-07-10 PROBLEM — Z11.3 SCREEN FOR STD (SEXUALLY TRANSMITTED DISEASE): Status: RESOLVED | Noted: 2022-07-25 | Resolved: 2025-07-10

## 2025-07-10 LAB
ABSOLUTE EOSINOPHIL (OHS): 0.02 K/UL
ABSOLUTE MONOCYTE (OHS): 0.67 K/UL (ref 0.3–1)
ABSOLUTE NEUTROPHIL COUNT (OHS): 5.97 K/UL (ref 1.8–7.7)
ALBUMIN SERPL BCP-MCNC: 2.5 G/DL (ref 3.5–5.2)
ALP SERPL-CCNC: 91 UNIT/L (ref 40–150)
ALT SERPL W/O P-5'-P-CCNC: 8 UNIT/L (ref 10–44)
ANION GAP (OHS): 10 MMOL/L (ref 8–16)
AST SERPL-CCNC: 14 UNIT/L (ref 11–45)
BASOPHILS # BLD AUTO: 0.02 K/UL
BASOPHILS NFR BLD AUTO: 0.2 %
BILIRUB SERPL-MCNC: 0.3 MG/DL (ref 0.1–1)
BUN SERPL-MCNC: 7 MG/DL (ref 6–20)
CALCIUM SERPL-MCNC: 7.9 MG/DL (ref 8.7–10.5)
CHLORIDE SERPL-SCNC: 104 MMOL/L (ref 95–110)
CO2 SERPL-SCNC: 20 MMOL/L (ref 23–29)
CREAT SERPL-MCNC: 0.5 MG/DL (ref 0.5–1.4)
CREAT UR-MCNC: 43 MG/DL (ref 15–325)
ERYTHROCYTE [DISTWIDTH] IN BLOOD BY AUTOMATED COUNT: 14.9 % (ref 11.5–14.5)
GFR SERPLBLD CREATININE-BSD FMLA CKD-EPI: >60 ML/MIN/1.73/M2
GLUCOSE SERPL-MCNC: 69 MG/DL (ref 70–110)
HCT VFR BLD AUTO: 29.9 % (ref 37–48.5)
HGB BLD-MCNC: 9.2 GM/DL (ref 12–16)
HIV 1+2 AB+HIV1 P24 AG SERPL QL IA: NORMAL
IMM GRANULOCYTES # BLD AUTO: 0.13 K/UL (ref 0–0.04)
IMM GRANULOCYTES NFR BLD AUTO: 1.4 % (ref 0–0.5)
LYMPHOCYTES # BLD AUTO: 2.67 K/UL (ref 1–4.8)
MCH RBC QN AUTO: 25.3 PG (ref 27–31)
MCHC RBC AUTO-ENTMCNC: 30.8 G/DL (ref 32–36)
MCV RBC AUTO: 82 FL (ref 82–98)
NUCLEATED RBC (/100WBC) (OHS): 0 /100 WBC
PLATELET # BLD AUTO: 332 K/UL (ref 150–450)
PMV BLD AUTO: 10.6 FL (ref 9.2–12.9)
POTASSIUM SERPL-SCNC: 4.2 MMOL/L (ref 3.5–5.1)
PROT SERPL-MCNC: 6 GM/DL (ref 6–8.4)
PROT UR-MCNC: <7 MG/DL
PROT/CREAT UR: NORMAL MG/G{CREAT}
RBC # BLD AUTO: 3.64 M/UL (ref 4–5.4)
RELATIVE EOSINOPHIL (OHS): 0.2 %
RELATIVE LYMPHOCYTE (OHS): 28.2 % (ref 18–48)
RELATIVE MONOCYTE (OHS): 7.1 % (ref 4–15)
RELATIVE NEUTROPHIL (OHS): 62.9 % (ref 38–73)
SODIUM SERPL-SCNC: 134 MMOL/L (ref 136–145)
WBC # BLD AUTO: 9.48 K/UL (ref 3.9–12.7)

## 2025-07-10 PROCEDURE — 86593 SYPHILIS TEST NON-TREP QUANT: CPT

## 2025-07-10 PROCEDURE — 99214 OFFICE O/P EST MOD 30 MIN: CPT | Mod: TH,S$PBB,, | Performed by: MIDWIFE

## 2025-07-10 PROCEDURE — 87389 HIV-1 AG W/HIV-1&-2 AB AG IA: CPT

## 2025-07-10 PROCEDURE — 99999 PR PBB SHADOW E&M-EST. PATIENT-LVL II: CPT | Mod: PBBFAC,,, | Performed by: MIDWIFE

## 2025-07-10 PROCEDURE — 36415 COLL VENOUS BLD VENIPUNCTURE: CPT

## 2025-07-10 PROCEDURE — 82040 ASSAY OF SERUM ALBUMIN: CPT

## 2025-07-10 PROCEDURE — 82570 ASSAY OF URINE CREATININE: CPT | Performed by: MIDWIFE

## 2025-07-10 PROCEDURE — 85025 COMPLETE CBC W/AUTO DIFF WBC: CPT

## 2025-07-10 PROCEDURE — 99212 OFFICE O/P EST SF 10 MIN: CPT | Mod: PBBFAC | Performed by: MIDWIFE

## 2025-07-10 NOTE — TELEPHONE ENCOUNTER
Call to patient and scheduled on the next available virtual appointment with the office. Patient stated she had done a virtual before and did not need instructions.

## 2025-07-10 NOTE — TELEPHONE ENCOUNTER
Perinatology referral noted. Consult only, ok virtually per referring CNM. Message routed to [facility] inbasket for scheduling at this time.

## 2025-07-11 ENCOUNTER — PROCEDURE VISIT (OUTPATIENT)
Dept: OBSTETRICS AND GYNECOLOGY | Facility: CLINIC | Age: 25
End: 2025-07-11
Payer: MEDICAID

## 2025-07-11 DIAGNOSIS — I10 CHRONIC HYPERTENSION: ICD-10-CM

## 2025-07-11 LAB — T PALLIDUM IGG+IGM SER QL: NORMAL

## 2025-07-11 PROCEDURE — 76819 FETAL BIOPHYS PROFIL W/O NST: CPT | Mod: PBBFAC | Performed by: OBSTETRICS & GYNECOLOGY

## 2025-07-12 DIAGNOSIS — R79.89 LOW SERUM CALCIUM: ICD-10-CM

## 2025-07-12 DIAGNOSIS — O99.012 ANEMIA DURING PREGNANCY IN SECOND TRIMESTER: Primary | ICD-10-CM

## 2025-07-13 NOTE — PROGRESS NOTES
"  32 weeks gestation of pregnancy  -     Comprehensive Metabolic Panel; Future; Expected date: 07/10/2025  -     CBC Auto Differential; Future; Expected date: 07/10/2025  -     Protein/Creatinine Ratio, Urine  -     HIV 1/2 Ag/Ab (4th Gen); Future; Expected date: 07/10/2025  -     Treponema Pallidium Antibodies IgG, IgM; Future; Expected date: 07/10/2025      Overall doing well, good fetal movement  Denies contractions, VB, LOF  Reports compliant with iron supplement  Does not plan circumcision for baby boy "Miki"  Reviewed TDAP recommendations, desires next OV  15 lb 14oz TWG. 10lb gain since last OV. Pt reports just got back from vacation. Discussed healthy eating choices, limiting salt, and increasing water intake.   Kick counts, PTL, and pre-E precautions reviewed  RTC tomorrow for U/S    Chronic hypertension  -     US OB/GYN Procedure (Viewpoint)-Future; Standing  -     Ambulatory referral/consult to Perinatology; Future; Expected date: 07/17/2025    BP mildly elevated today, no meds up until now  MFM referral   Begin weekly BPP's   PIH labs and strict pre-E precautions     BMI 50.0-59.9, adult  -     US OB/GYN Procedure (Viewpoint)-Future; Standing  -     Ambulatory referral/consult to Perinatology; Future; Expected date: 07/17/2025    Growth U/S and BPP scheduled tomorrow  Daily baby ASA  GTT 77    HSV infection     Ppx 35 weeks  "

## 2025-07-18 ENCOUNTER — PROCEDURE VISIT (OUTPATIENT)
Dept: OBSTETRICS AND GYNECOLOGY | Facility: CLINIC | Age: 25
End: 2025-07-18
Payer: MEDICAID

## 2025-07-18 ENCOUNTER — TELEPHONE (OUTPATIENT)
Dept: OBSTETRICS AND GYNECOLOGY | Facility: CLINIC | Age: 25
End: 2025-07-18
Payer: MEDICAID

## 2025-07-18 DIAGNOSIS — I10 CHRONIC HYPERTENSION: ICD-10-CM

## 2025-07-18 PROCEDURE — 76819 FETAL BIOPHYS PROFIL W/O NST: CPT | Mod: PBBFAC | Performed by: OBSTETRICS & GYNECOLOGY

## 2025-07-18 NOTE — TELEPHONE ENCOUNTER
Attempted to call patient twice and pt's mother with no answer. Tried to locate pt for her OB visit after ultrasound.

## 2025-07-24 DIAGNOSIS — I10 CHRONIC HYPERTENSION: Primary | ICD-10-CM

## 2025-07-24 NOTE — PROGRESS NOTES
CHIEF COMPLAINT:   Patient presents with      Possible Pregnancy        HISTORY OF PRESENT ILLNESS  Kaylie Dangelo 24 y.o.  presents for pregnancy risk assessment.   The patient has no complaints today.  No nausea or vomiting. No bleeding or pain. Denies domestic abuse.  Denies chemical/pesticide/radiation exposure.    OB history:  G1 -induction for nonreassuring fetal status on its medial testing.  reports that she was induced with Pitocin and Correa balloon she did get an epidural with this delivery.    LMP: Patient's last menstrual period was 2024 (approximate).  EDC:  Estimated Date of Delivery: 9/3/25  EGA:  34w1d       Health Maintenance   Topic Date Due    COVID-19 Vaccine (3 - 2024-25 season) 2024    Influenza Vaccine (1) 2025    Chlamydia Screening  2026    Pap Smear  2028    TETANUS VACCINE  2033    Hepatitis C Screening  Completed    HIV Screening  Completed    RSV Vaccine (Age 60+ and Pregnant patients) (No Doses Required) Completed    Lipid Panel  Completed    Pneumococcal Vaccines (Age 0-49)  Aged Out       Past Medical History:   Diagnosis Date    ADHD, predominantly hyperactive-impulsive subtype     Asthma     Bipolar 1 disorder     Depression     Hypertension     Insomnia     Sleep apnea, unspecified        History reviewed. No pertinent surgical history.    Family History   Problem Relation Name Age of Onset    Diabetes Mother      Heart failure Mother      Breast cancer Neg Hx      Colon cancer Neg Hx      Ovarian cancer Neg Hx         Social History     Socioeconomic History    Marital status: Single   Tobacco Use    Smoking status: Former    Smokeless tobacco: Never   Substance and Sexual Activity    Alcohol use: No    Drug use: Not Currently     Types: Marijuana    Sexual activity: Yes     Partners: Male     Birth control/protection: None   Social History Narrative    Currently lives at [facility]. Admitted 2015. Expected  to be the for at lest 3-6 months. In [facility] custody.      Social Drivers of Health     Financial Resource Strain: Medium Risk (5/20/2025)    Overall Financial Resource Strain (CARDIA)     Difficulty of Paying Living Expenses: Somewhat hard   Food Insecurity: No Food Insecurity (5/20/2025)    Hunger Vital Sign     Worried About Running Out of Food in the Last Year: Never true     Ran Out of Food in the Last Year: Never true   Transportation Needs: No Transportation Needs (5/20/2025)    PRAPARE - Transportation     Lack of Transportation (Medical): No     Lack of Transportation (Non-Medical): No   Physical Activity: Unknown (5/20/2025)    Exercise Vital Sign     Days of Exercise per Week: 7 days     Minutes of Exercise per Session: Patient declined   Stress: No Stress Concern Present (5/20/2025)    [employer] [place] [employer] - Occupational Stress Questionnaire     Feeling of Stress : Not at all   Housing Stability: High Risk (5/20/2025)    Housing Stability Vital Sign     Unable to Pay for Housing in the Last Year: Yes     Number of Times Moved in the Last Year: 1     Homeless in the Last Year: Patient declined       Current Medications[1]    Review of patient's allergies indicates:   Allergen Reactions    Oranges [orange]      Per patient- other citrus products are okay    Penicillins     Lamotrigine Rash     Reported by mother on 12/19/18         PHYSICAL EXAM   Vitals:    06/25/25 0900   BP: 116/78        PAIN SCALE: 0/10 None    PHYSICAL EXAM    ROS:  GENERAL: No fever, chills, fatigability or weight loss.  CV: Denies chest pain  PULM: Denies shortness of breath or wheezing.  ABDOMEN: Appetite fine. No weight loss. Denies diarrhea, abdominal pain, hematemesis or blood in stool.  URINARY: No flank pain, dysuria or hematuria.  REPRODUCTIVE: No abnormal vaginal bleeding.       PE:   APPEARANCE: Well nourished, well developed, in no acute distress  CHEST: Clear to auscultation bilaterally  CV: Regular rate  and rhythm  BREASTS: Symmetrical, no skin changes or visible lesions. No palpable masses, nipple discharge or adenopathy bilaterally.  ABDOMEN: Soft. No tenderness or masses. No hepatosplenomegaly. No hernias      UPT +    A/P  Patient's medications and medical history reviewed with patient and implications in pregnancy.   Discussed risk of Toxoplasmosis transmission from pets and reviewed risk reduction techniques.  Pt was counseled on exercise in pregnancy and weight gain recommendations.  Pt was counseled on travel recommendations and on risks of Zika virus exposure.  Current CDC Zika advisories and prevention techniques were reviewed with pt.  Pt denies any recent international travel and does not plan travel during pregnancy.   Oriented to practice including CNM collaboration.   Follow-up initial OB, with labs and u/s.     1. HTN in pregnancy, chronic  -     Connected MOM Enrollment  -     [facility] Connected MOM Program Consent Questionnaire  -     US OB/GYN Procedure ([facility])-Future; Future  -     Protein/Creatinine Ratio, Urine; Future; Expected date: 2025    2. Anemia during pregnancy in second trimester  Overview:  Iron supplement  Hem/onc referral     Orders:  -     ferrous sulfate (FEOSOL) 325 mg (65 mg iron) Tab tablet; Take 1 tablet (325 mg total) by mouth once daily.  Dispense: 90 tablet; Refill: 3    3. Bipolar 1 disorder  Overview:  Mood has been stable she is not taking any medications at this time.    Assessment & Plan:  Mood stable.      4. Chronic hypertension  Overview:  Baby aspirin 81mg daily until delivery for possible prevention of preeclampsia.    Baseline urine P/C ratio was not completed. Will obtain at next visit, patient called to complete this at her next visit  Plan for growth ultrasound at 28-32 weeks as clinically appropriate as well as weekly  testing starting at 32 weeks.  Patient does not require medication at this time, as BPs within goal range  --Delivery  planning pending BP control.                        Savanna Gibbs         [1]   Current Outpatient Medications   Medication Sig Dispense Refill    aspirin (ECOTRIN) 81 MG EC tablet Take 1 tablet (81 mg total) by mouth once daily. 90 tablet 3    prenatal vit 87-iron-folic-dha (PRENATE MINI, FERR ASP GLYCIN,) 18-1-350 mg Cap Take 1 tablet by mouth once daily. for 270 doses 90 capsule 3    ferrous sulfate (FEOSOL) 325 mg (65 mg iron) Tab tablet Take 1 tablet (325 mg total) by mouth once daily. 90 tablet 3     No current facility-administered medications for this visit.

## 2025-07-28 ENCOUNTER — TELEPHONE (OUTPATIENT)
Dept: MATERNAL FETAL MEDICINE | Facility: CLINIC | Age: 25
End: 2025-07-28
Payer: MEDICAID

## 2025-07-28 ENCOUNTER — PATIENT MESSAGE (OUTPATIENT)
Dept: MATERNAL FETAL MEDICINE | Facility: CLINIC | Age: 25
End: 2025-07-28
Payer: MEDICAID

## 2025-07-28 NOTE — TELEPHONE ENCOUNTER
Call to patient to schedule her consult. It stated this person is not accepting calls at this time.

## 2025-07-30 ENCOUNTER — PATIENT MESSAGE (OUTPATIENT)
Dept: OTHER | Facility: OTHER | Age: 25
End: 2025-07-30
Payer: MEDICAID

## 2025-07-30 ENCOUNTER — PROCEDURE VISIT (OUTPATIENT)
Dept: OBSTETRICS AND GYNECOLOGY | Facility: CLINIC | Age: 25
End: 2025-07-30
Payer: MEDICAID

## 2025-07-30 ENCOUNTER — ROUTINE PRENATAL (OUTPATIENT)
Dept: OBSTETRICS AND GYNECOLOGY | Facility: CLINIC | Age: 25
End: 2025-07-30
Payer: MEDICAID

## 2025-07-30 ENCOUNTER — PATIENT MESSAGE (OUTPATIENT)
Dept: OBSTETRICS AND GYNECOLOGY | Facility: CLINIC | Age: 25
End: 2025-07-30

## 2025-07-30 VITALS — SYSTOLIC BLOOD PRESSURE: 104 MMHG | WEIGHT: 293 LBS | BODY MASS INDEX: 56.37 KG/M2 | DIASTOLIC BLOOD PRESSURE: 68 MMHG

## 2025-07-30 DIAGNOSIS — I10 CHRONIC HYPERTENSION: ICD-10-CM

## 2025-07-30 DIAGNOSIS — O99.013 ANEMIA DURING PREGNANCY IN THIRD TRIMESTER: ICD-10-CM

## 2025-07-30 DIAGNOSIS — R79.89 LOW SERUM CALCIUM: ICD-10-CM

## 2025-07-30 DIAGNOSIS — Z3A.35 35 WEEKS GESTATION OF PREGNANCY: Primary | ICD-10-CM

## 2025-07-30 PROBLEM — B00.9 HSV INFECTION: Status: RESOLVED | Noted: 2021-07-01 | Resolved: 2025-07-30

## 2025-07-30 PROCEDURE — 99213 OFFICE O/P EST LOW 20 MIN: CPT | Mod: PBBFAC,TH | Performed by: MIDWIFE

## 2025-07-30 PROCEDURE — 99999 PR PBB SHADOW E&M-EST. PATIENT-LVL III: CPT | Mod: PBBFAC,,, | Performed by: MIDWIFE

## 2025-07-30 PROCEDURE — 99214 OFFICE O/P EST MOD 30 MIN: CPT | Mod: TH,S$PBB,, | Performed by: MIDWIFE

## 2025-07-30 RX ORDER — VALACYCLOVIR HYDROCHLORIDE 500 MG/1
500 TABLET, FILM COATED ORAL 2 TIMES DAILY
Qty: 60 TABLET | Refills: 1 | Status: SHIPPED | OUTPATIENT
Start: 2025-07-30 | End: 2025-07-30 | Stop reason: CLARIF

## 2025-07-30 NOTE — PROGRESS NOTES
35 weeks gestation of pregnancy  -     BREAST PUMP FOR HOME USE  -     Discontinue: Tdap (BOOSTRIX) vaccine injection 0.5 mL    Doing well, no complaints  Good fetal movement  Denies contractions, VB, LOF  Pt wanted TDAP today, but left before administration. Will offer next OV.  Breast pump Rx and instructions given  GBS testing reviewed and info attached to AVS  U/S today: VTX, posterior placenta, MVP 5.5, BPP 8/8, growth 16%tile with AC at 10%tile. Scan reassuring.  13lb 3.6oz TWG  Kick counts and PTL precautions  RTC in 1 week, sooner if needed     Low serum calcium    Advised supplement     BMI 50.0-59.9, adult    Appropriate weight gain    Anemia during pregnancy in third trimester  -     Ambulatory referral/consult to Hematology / Oncology; Future; Expected date: 2025    Continue iron supplement  Pt never heard from hematology. Hem/onc referral STAT.     Chronic hypertension    BP normal today, no meds  Continue  testing  IOL  at 9AM  Missed M visit, rescheduled to     Other orders  -     Discontinue: valACYclovir (VALTREX) 500 MG tablet; Take 1 tablet (500 mg total) by mouth 2 (two) times daily.  Dispense: 60 tablet; Refill: 1     HSV noted in chart. Pt upset by this diagnoses.  had burning with urination when she was 19. Someone put HSV in her chart when she really had a UTI. [facility] has never had an outbreak & all follow-up testing was normal. Unable to view any labs confirming this in chart. [facility] does not want to do serum HSV today, but will do with admit labs on L&D. Will assess for lesions with GBS swab.

## 2025-08-12 ENCOUNTER — TELEPHONE (OUTPATIENT)
Dept: OBSTETRICS AND GYNECOLOGY | Facility: CLINIC | Age: 25
End: 2025-08-12
Payer: MEDICAID

## 2025-08-13 ENCOUNTER — OFFICE VISIT (OUTPATIENT)
Dept: MATERNAL FETAL MEDICINE | Facility: CLINIC | Age: 25
End: 2025-08-13
Payer: MEDICAID

## 2025-08-13 DIAGNOSIS — I10 CHRONIC HYPERTENSION: ICD-10-CM

## 2025-08-14 ENCOUNTER — PROCEDURE VISIT (OUTPATIENT)
Dept: OBSTETRICS AND GYNECOLOGY | Facility: CLINIC | Age: 25
End: 2025-08-14
Payer: MEDICAID

## 2025-08-14 ENCOUNTER — ROUTINE PRENATAL (OUTPATIENT)
Dept: OBSTETRICS AND GYNECOLOGY | Facility: CLINIC | Age: 25
End: 2025-08-14
Payer: MEDICAID

## 2025-08-14 VITALS — DIASTOLIC BLOOD PRESSURE: 70 MMHG | WEIGHT: 293 LBS | SYSTOLIC BLOOD PRESSURE: 120 MMHG | BODY MASS INDEX: 57.02 KG/M2

## 2025-08-14 DIAGNOSIS — Z3A.37 37 WEEKS GESTATION OF PREGNANCY: Primary | ICD-10-CM

## 2025-08-14 DIAGNOSIS — I10 CHRONIC HYPERTENSION: ICD-10-CM

## 2025-08-14 DIAGNOSIS — O99.012 ANEMIA DURING PREGNANCY IN SECOND TRIMESTER: ICD-10-CM

## 2025-08-14 PROCEDURE — 99999 PR PBB SHADOW E&M-EST. PATIENT-LVL II: CPT | Mod: PBBFAC,,, | Performed by: MIDWIFE

## 2025-08-14 PROCEDURE — 90471 IMMUNIZATION ADMIN: CPT | Mod: PBBFAC

## 2025-08-14 PROCEDURE — 87653 STREP B DNA AMP PROBE: CPT | Performed by: MIDWIFE

## 2025-08-14 PROCEDURE — 76819 FETAL BIOPHYS PROFIL W/O NST: CPT | Mod: PBBFAC | Performed by: OBSTETRICS & GYNECOLOGY

## 2025-08-14 PROCEDURE — 99999PBSHW PR PBB SHADOW TECHNICAL ONLY FILED TO HB: Mod: PBBFAC,,,

## 2025-08-14 PROCEDURE — 90715 TDAP VACCINE 7 YRS/> IM: CPT | Mod: PBBFAC

## 2025-08-14 PROCEDURE — 99212 OFFICE O/P EST SF 10 MIN: CPT | Mod: PBBFAC,TH | Performed by: MIDWIFE

## 2025-08-14 RX ADMIN — CLOSTRIDIUM TETANI TOXOID ANTIGEN (FORMALDEHYDE INACTIVATED), CORYNEBACTERIUM DIPHTHERIAE TOXOID ANTIGEN (FORMALDEHYDE INACTIVATED), BORDETELLA PERTUSSIS TOXOID ANTIGEN (GLUTARALDEHYDE INACTIVATED), BORDETELLA PERTUSSIS FILAMENTOUS HEMAGGLUTININ ANTIGEN (FORMALDEHYDE INACTIVATED), BORDETELLA PERTUSSIS PERTACTIN ANTIGEN, AND BORDETELLA PERTUSSIS FIMBRIAE 2/3 ANTIGEN 0.5 ML: 5; 2; 2.5; 5; 3; 5 INJECTION, SUSPENSION INTRAMUSCULAR at 10:08

## 2025-08-16 LAB — GROUP B STREPTOCOCCUS, PCR (OHS): NEGATIVE

## 2025-08-19 ENCOUNTER — TELEPHONE (OUTPATIENT)
Dept: HEMATOLOGY/ONCOLOGY | Facility: CLINIC | Age: 25
End: 2025-08-19
Payer: MEDICAID

## 2025-08-19 ENCOUNTER — PATIENT MESSAGE (OUTPATIENT)
Dept: HEMATOLOGY/ONCOLOGY | Facility: CLINIC | Age: 25
End: 2025-08-19
Payer: MEDICAID

## 2025-08-19 DIAGNOSIS — O99.013 ANEMIA AFFECTING PREGNANCY IN THIRD TRIMESTER: Primary | ICD-10-CM

## 2025-08-21 ENCOUNTER — ROUTINE PRENATAL (OUTPATIENT)
Dept: OBSTETRICS AND GYNECOLOGY | Facility: CLINIC | Age: 25
End: 2025-08-21
Payer: MEDICAID

## 2025-08-21 ENCOUNTER — OFFICE VISIT (OUTPATIENT)
Dept: HEMATOLOGY/ONCOLOGY | Facility: CLINIC | Age: 25
End: 2025-08-21
Payer: MEDICAID

## 2025-08-21 ENCOUNTER — PROCEDURE VISIT (OUTPATIENT)
Dept: OBSTETRICS AND GYNECOLOGY | Facility: CLINIC | Age: 25
End: 2025-08-21
Payer: MEDICAID

## 2025-08-21 ENCOUNTER — LAB VISIT (OUTPATIENT)
Dept: LAB | Facility: HOSPITAL | Age: 25
End: 2025-08-21
Attending: NURSE PRACTITIONER
Payer: MEDICAID

## 2025-08-21 VITALS — SYSTOLIC BLOOD PRESSURE: 98 MMHG | DIASTOLIC BLOOD PRESSURE: 64 MMHG | BODY MASS INDEX: 56.98 KG/M2 | WEIGHT: 293 LBS

## 2025-08-21 DIAGNOSIS — O99.013 MATERNAL IRON DEFICIENCY ANEMIA AFFECTING PREGNANCY IN THIRD TRIMESTER, ANTEPARTUM: ICD-10-CM

## 2025-08-21 DIAGNOSIS — I10 CHRONIC HYPERTENSION: ICD-10-CM

## 2025-08-21 DIAGNOSIS — O99.013 ANEMIA AFFECTING PREGNANCY IN THIRD TRIMESTER: ICD-10-CM

## 2025-08-21 DIAGNOSIS — O99.013 ANEMIA DURING PREGNANCY IN THIRD TRIMESTER: ICD-10-CM

## 2025-08-21 DIAGNOSIS — D50.9 MICROCYTIC ANEMIA: Primary | ICD-10-CM

## 2025-08-21 DIAGNOSIS — E53.8 MATERNAL VITAMIN B12 DEFICIENCY: ICD-10-CM

## 2025-08-21 DIAGNOSIS — Z3A.38 38 WEEKS GESTATION OF PREGNANCY: Primary | ICD-10-CM

## 2025-08-21 DIAGNOSIS — F31.9 BIPOLAR 1 DISORDER: ICD-10-CM

## 2025-08-21 DIAGNOSIS — O10.919 CHRONIC HYPERTENSION AFFECTING PREGNANCY: ICD-10-CM

## 2025-08-21 DIAGNOSIS — O99.280 MATERNAL VITAMIN B12 DEFICIENCY: ICD-10-CM

## 2025-08-21 DIAGNOSIS — D50.9 IRON DEFICIENCY ANEMIA, UNSPECIFIED IRON DEFICIENCY ANEMIA TYPE: ICD-10-CM

## 2025-08-21 DIAGNOSIS — D50.9 MATERNAL IRON DEFICIENCY ANEMIA AFFECTING PREGNANCY IN THIRD TRIMESTER, ANTEPARTUM: ICD-10-CM

## 2025-08-21 DIAGNOSIS — O99.012 ANEMIA DURING PREGNANCY IN SECOND TRIMESTER: ICD-10-CM

## 2025-08-21 LAB
ABSOLUTE EOSINOPHIL (OHS): 0.01 K/UL
ABSOLUTE MONOCYTE (OHS): 0.66 K/UL (ref 0.3–1)
ABSOLUTE NEUTROPHIL COUNT (OHS): 6.04 K/UL (ref 1.8–7.7)
BASOPHILS # BLD AUTO: 0.01 K/UL
BASOPHILS NFR BLD AUTO: 0.1 %
ERYTHROCYTE [DISTWIDTH] IN BLOOD BY AUTOMATED COUNT: 15 % (ref 11.5–14.5)
FERRITIN SERPL-MCNC: 11 NG/ML (ref 20–300)
FOLATE SERPL-MCNC: 6 NG/ML (ref 4–24)
HCT VFR BLD AUTO: 29.1 % (ref 37–48.5)
HGB BLD-MCNC: 9.4 GM/DL (ref 12–16)
IMM GRANULOCYTES # BLD AUTO: 0.06 K/UL (ref 0–0.04)
IMM GRANULOCYTES NFR BLD AUTO: 0.7 % (ref 0–0.5)
IRON SATN MFR SERPL: 7 % (ref 20–50)
IRON SERPL-MCNC: 34 UG/DL (ref 30–160)
LYMPHOCYTES # BLD AUTO: 2.37 K/UL (ref 1–4.8)
MCH RBC QN AUTO: 24.9 PG (ref 27–31)
MCHC RBC AUTO-ENTMCNC: 32.3 G/DL (ref 32–36)
MCV RBC AUTO: 77 FL (ref 82–98)
NUCLEATED RBC (/100WBC) (OHS): 0 /100 WBC
PLATELET # BLD AUTO: 318 K/UL (ref 150–450)
PMV BLD AUTO: 9.8 FL (ref 9.2–12.9)
RBC # BLD AUTO: 3.78 M/UL (ref 4–5.4)
RELATIVE EOSINOPHIL (OHS): 0.1 %
RELATIVE LYMPHOCYTE (OHS): 25.9 % (ref 18–48)
RELATIVE MONOCYTE (OHS): 7.2 % (ref 4–15)
RELATIVE NEUTROPHIL (OHS): 66 % (ref 38–73)
TIBC SERPL-MCNC: 485 UG/DL (ref 250–450)
TRANSFERRIN SERPL-MCNC: 328 MG/DL (ref 200–375)
TSH SERPL-ACNC: 2.81 UIU/ML (ref 0.4–4)
VIT B12 SERPL-MCNC: 222 PG/ML (ref 210–950)
WBC # BLD AUTO: 9.15 K/UL (ref 3.9–12.7)

## 2025-08-21 PROCEDURE — 98003 SYNCH AUDIO-VIDEO NEW HI 60: CPT | Mod: 95,,, | Performed by: NURSE PRACTITIONER

## 2025-08-21 PROCEDURE — 99999 PR PBB SHADOW E&M-EST. PATIENT-LVL II: CPT | Mod: PBBFAC,,,

## 2025-08-21 PROCEDURE — 82746 ASSAY OF FOLIC ACID SERUM: CPT

## 2025-08-21 PROCEDURE — 83540 ASSAY OF IRON: CPT

## 2025-08-21 PROCEDURE — 83921 ORGANIC ACID SINGLE QUANT: CPT

## 2025-08-21 PROCEDURE — 1160F RVW MEDS BY RX/DR IN RCRD: CPT | Mod: CPTII,95,, | Performed by: NURSE PRACTITIONER

## 2025-08-21 PROCEDURE — 1159F MED LIST DOCD IN RCRD: CPT | Mod: CPTII,95,, | Performed by: NURSE PRACTITIONER

## 2025-08-21 PROCEDURE — 83090 ASSAY OF HOMOCYSTEINE: CPT

## 2025-08-21 PROCEDURE — 76819 FETAL BIOPHYS PROFIL W/O NST: CPT | Mod: 26,S$PBB,, | Performed by: OBSTETRICS & GYNECOLOGY

## 2025-08-21 PROCEDURE — 36415 COLL VENOUS BLD VENIPUNCTURE: CPT

## 2025-08-21 PROCEDURE — 82607 VITAMIN B-12: CPT

## 2025-08-21 PROCEDURE — 99212 OFFICE O/P EST SF 10 MIN: CPT | Mod: PBBFAC,TH,25

## 2025-08-21 PROCEDURE — 76816 OB US FOLLOW-UP PER FETUS: CPT | Mod: PBBFAC | Performed by: OBSTETRICS & GYNECOLOGY

## 2025-08-21 PROCEDURE — 84443 ASSAY THYROID STIM HORMONE: CPT

## 2025-08-21 PROCEDURE — 82728 ASSAY OF FERRITIN: CPT

## 2025-08-21 PROCEDURE — 85025 COMPLETE CBC W/AUTO DIFF WBC: CPT

## 2025-08-21 RX ORDER — SODIUM CHLORIDE 0.9 % (FLUSH) 0.9 %
10 SYRINGE (ML) INJECTION
OUTPATIENT
Start: 2025-08-21

## 2025-08-21 RX ORDER — EPINEPHRINE 0.3 MG/.3ML
0.3 INJECTION SUBCUTANEOUS ONCE AS NEEDED
OUTPATIENT
Start: 2025-08-21 | End: 2037-01-17

## 2025-08-22 LAB — (HCYS)2 SERPL-MCNC: 5.9 UMOL/L (ref 4–15.5)

## 2025-08-22 RX ORDER — OXYTOCIN-SODIUM CHLORIDE 0.9% IV SOLN 30 UNIT/500ML 30-0.9/5 UT/ML-%
10 SOLUTION INTRAVENOUS ONCE AS NEEDED
OUTPATIENT
Start: 2025-08-22 | End: 2037-01-17

## 2025-08-22 RX ORDER — ONDANSETRON 8 MG/1
8 TABLET, ORALLY DISINTEGRATING ORAL EVERY 8 HOURS PRN
OUTPATIENT
Start: 2025-08-22

## 2025-08-22 RX ORDER — CARBOPROST TROMETHAMINE 250 UG/ML
250 INJECTION, SOLUTION INTRAMUSCULAR
OUTPATIENT
Start: 2025-08-22

## 2025-08-22 RX ORDER — DIPHENOXYLATE HYDROCHLORIDE AND ATROPINE SULFATE 2.5; .025 MG/1; MG/1
2 TABLET ORAL EVERY 6 HOURS PRN
OUTPATIENT
Start: 2025-08-22

## 2025-08-22 RX ORDER — MISOPROSTOL 200 UG/1
800 TABLET ORAL ONCE AS NEEDED
OUTPATIENT
Start: 2025-08-22 | End: 2037-01-17

## 2025-08-22 RX ORDER — OXYTOCIN-SODIUM CHLORIDE 0.9% IV SOLN 30 UNIT/500ML 30-0.9/5 UT/ML-%
30 SOLUTION INTRAVENOUS ONCE AS NEEDED
OUTPATIENT
Start: 2025-08-22 | End: 2037-01-17

## 2025-08-22 RX ORDER — LIDOCAINE HYDROCHLORIDE 10 MG/ML
10 INJECTION, SOLUTION INFILTRATION; PERINEURAL ONCE AS NEEDED
OUTPATIENT
Start: 2025-08-22 | End: 2037-01-17

## 2025-08-22 RX ORDER — MISOPROSTOL 200 UG/1
800 TABLET ORAL ONCE AS NEEDED
OUTPATIENT
Start: 2025-08-22

## 2025-08-22 RX ORDER — SIMETHICONE 80 MG
1 TABLET,CHEWABLE ORAL 4 TIMES DAILY PRN
OUTPATIENT
Start: 2025-08-22

## 2025-08-22 RX ORDER — TERBUTALINE SULFATE 1 MG/ML
0.25 INJECTION SUBCUTANEOUS
OUTPATIENT
Start: 2025-08-22

## 2025-08-22 RX ORDER — METHYLERGONOVINE MALEATE 0.2 MG/ML
200 INJECTION INTRAVENOUS ONCE AS NEEDED
OUTPATIENT
Start: 2025-08-22 | End: 2037-01-17

## 2025-08-22 RX ORDER — CALCIUM CARBONATE 200(500)MG
500 TABLET,CHEWABLE ORAL 3 TIMES DAILY PRN
OUTPATIENT
Start: 2025-08-22

## 2025-08-22 RX ORDER — SODIUM CHLORIDE, SODIUM LACTATE, POTASSIUM CHLORIDE, CALCIUM CHLORIDE 600; 310; 30; 20 MG/100ML; MG/100ML; MG/100ML; MG/100ML
INJECTION, SOLUTION INTRAVENOUS CONTINUOUS
OUTPATIENT
Start: 2025-08-22

## 2025-08-22 RX ORDER — MUPIROCIN 20 MG/G
OINTMENT TOPICAL
OUTPATIENT
Start: 2025-08-22

## 2025-08-22 RX ORDER — OXYTOCIN 10 [USP'U]/ML
10 INJECTION, SOLUTION INTRAMUSCULAR; INTRAVENOUS ONCE AS NEEDED
OUTPATIENT
Start: 2025-08-22 | End: 2037-01-17

## 2025-08-22 RX ORDER — OXYTOCIN/0.9 % SODIUM CHLORIDE 15/250 ML
95 PLASTIC BAG, INJECTION (ML) INTRAVENOUS CONTINUOUS PRN
OUTPATIENT
Start: 2025-08-22

## 2025-08-22 RX ORDER — OXYTOCIN/0.9 % SODIUM CHLORIDE 15/250 ML
95 PLASTIC BAG, INJECTION (ML) INTRAVENOUS ONCE AS NEEDED
OUTPATIENT
Start: 2025-08-22 | End: 2037-01-17

## 2025-08-26 ENCOUNTER — HOSPITAL ENCOUNTER (INPATIENT)
Facility: HOSPITAL | Age: 25
LOS: 2 days | Discharge: HOME | End: 2025-08-28
Attending: OBSTETRICS & GYNECOLOGY | Admitting: OBSTETRICS & GYNECOLOGY
Payer: MEDICAID

## 2025-08-26 ENCOUNTER — ANESTHESIA (OUTPATIENT)
Dept: OBSTETRICS AND GYNECOLOGY | Facility: HOSPITAL | Age: 25
End: 2025-08-26
Payer: MEDICAID

## 2025-08-26 ENCOUNTER — ANESTHESIA EVENT (OUTPATIENT)
Dept: OBSTETRICS AND GYNECOLOGY | Facility: HOSPITAL | Age: 25
End: 2025-08-26
Payer: MEDICAID

## 2025-08-26 DIAGNOSIS — I10 CHRONIC HYPERTENSION: ICD-10-CM

## 2025-08-26 DIAGNOSIS — O10.919 CHRONIC HYPERTENSION AFFECTING PREGNANCY: ICD-10-CM

## 2025-08-26 PROBLEM — O99.213 OBESITY AFFECTING PREGNANCY IN THIRD TRIMESTER: Status: ACTIVE | Noted: 2025-06-13

## 2025-08-26 LAB
ABSOLUTE EOSINOPHIL (OHS): 0.02 K/UL
ABSOLUTE MONOCYTE (OHS): 0.51 K/UL (ref 0.3–1)
ABSOLUTE NEUTROPHIL COUNT (OHS): 5.92 K/UL (ref 1.8–7.7)
ALBUMIN SERPL BCP-MCNC: 2.5 G/DL (ref 3.5–5.2)
ALP SERPL-CCNC: 115 UNIT/L (ref 40–150)
ALT SERPL W/O P-5'-P-CCNC: 10 UNIT/L (ref 10–44)
ANION GAP (OHS): 11 MMOL/L (ref 8–16)
AST SERPL-CCNC: 16 UNIT/L (ref 11–45)
BASOPHILS # BLD AUTO: 0.01 K/UL
BASOPHILS NFR BLD AUTO: 0.1 %
BILIRUB SERPL-MCNC: 0.4 MG/DL (ref 0.1–1)
BUN SERPL-MCNC: 12 MG/DL (ref 6–20)
CALCIUM SERPL-MCNC: 8.4 MG/DL (ref 8.7–10.5)
CHLORIDE SERPL-SCNC: 108 MMOL/L (ref 95–110)
CO2 SERPL-SCNC: 19 MMOL/L (ref 23–29)
CREAT SERPL-MCNC: 0.6 MG/DL (ref 0.5–1.4)
ERYTHROCYTE [DISTWIDTH] IN BLOOD BY AUTOMATED COUNT: 14.6 % (ref 11.5–14.5)
GFR SERPLBLD CREATININE-BSD FMLA CKD-EPI: >60 ML/MIN/1.73/M2
GLUCOSE SERPL-MCNC: 98 MG/DL (ref 70–110)
GROUP & RH: NORMAL
HCT VFR BLD AUTO: 28 % (ref 37–48.5)
HGB BLD-MCNC: 9 GM/DL (ref 12–16)
HIV 1+2 AB+HIV1 P24 AG SERPL QL IA: NORMAL
IMM GRANULOCYTES # BLD AUTO: 0.05 K/UL (ref 0–0.04)
IMM GRANULOCYTES NFR BLD AUTO: 0.6 % (ref 0–0.5)
INDIRECT COOMBS: NORMAL
LYMPHOCYTES # BLD AUTO: 2.22 K/UL (ref 1–4.8)
MCH RBC QN AUTO: 24.3 PG (ref 27–31)
MCHC RBC AUTO-ENTMCNC: 32.1 G/DL (ref 32–36)
MCV RBC AUTO: 76 FL (ref 82–98)
NUCLEATED RBC (/100WBC) (OHS): 0 /100 WBC
PLATELET # BLD AUTO: 319 K/UL (ref 150–450)
PMV BLD AUTO: 10.5 FL (ref 9.2–12.9)
POTASSIUM SERPL-SCNC: 3.3 MMOL/L (ref 3.5–5.1)
PROT SERPL-MCNC: 6.7 GM/DL (ref 6–8.4)
RBC # BLD AUTO: 3.7 M/UL (ref 4–5.4)
RELATIVE EOSINOPHIL (OHS): 0.2 %
RELATIVE LYMPHOCYTE (OHS): 25.4 % (ref 18–48)
RELATIVE MONOCYTE (OHS): 5.8 % (ref 4–15)
RELATIVE NEUTROPHIL (OHS): 67.9 % (ref 38–73)
SODIUM SERPL-SCNC: 138 MMOL/L (ref 136–145)
T PALLIDUM IGG+IGM SER QL: NORMAL
W METHYLMALONIC ACID: 0.46 UMOL/L
WBC # BLD AUTO: 8.73 K/UL (ref 3.9–12.7)

## 2025-08-26 PROCEDURE — 86850 RBC ANTIBODY SCREEN: CPT | Performed by: ADVANCED PRACTICE MIDWIFE

## 2025-08-26 PROCEDURE — 87389 HIV-1 AG W/HIV-1&-2 AB AG IA: CPT | Performed by: ADVANCED PRACTICE MIDWIFE

## 2025-08-26 PROCEDURE — 3E0DXGC INTRODUCTION OF OTHER THERAPEUTIC SUBSTANCE INTO MOUTH AND PHARYNX, EXTERNAL APPROACH: ICD-10-PCS | Performed by: OBSTETRICS & GYNECOLOGY

## 2025-08-26 PROCEDURE — 63600175 PHARM REV CODE 636 W HCPCS: Performed by: NURSE ANESTHETIST, CERTIFIED REGISTERED

## 2025-08-26 PROCEDURE — 62326 NJX INTERLAMINAR LMBR/SAC: CPT | Performed by: NURSE ANESTHETIST, CERTIFIED REGISTERED

## 2025-08-26 PROCEDURE — 72100003 HC LABOR CARE, EA. ADDL. 8 HRS

## 2025-08-26 PROCEDURE — 25000003 PHARM REV CODE 250: Performed by: ADVANCED PRACTICE MIDWIFE

## 2025-08-26 PROCEDURE — 72100002 HC LABOR CARE, 1ST 8 HOURS

## 2025-08-26 PROCEDURE — 27800516 HC TRAY, EPIDURAL COMBO: Performed by: STUDENT IN AN ORGANIZED HEALTH CARE EDUCATION/TRAINING PROGRAM

## 2025-08-26 PROCEDURE — 27200710 HC EPIDURAL INFUSION PUMP SET: Performed by: STUDENT IN AN ORGANIZED HEALTH CARE EDUCATION/TRAINING PROGRAM

## 2025-08-26 PROCEDURE — 63600175 PHARM REV CODE 636 W HCPCS: Performed by: ADVANCED PRACTICE MIDWIFE

## 2025-08-26 PROCEDURE — 72200005 HC VAGINAL DELIVERY LEVEL II

## 2025-08-26 PROCEDURE — 51702 INSERT TEMP BLADDER CATH: CPT

## 2025-08-26 PROCEDURE — 82435 ASSAY OF BLOOD CHLORIDE: CPT | Performed by: ADVANCED PRACTICE MIDWIFE

## 2025-08-26 PROCEDURE — 59409 OBSTETRICAL CARE: CPT | Mod: AT,,, | Performed by: ADVANCED PRACTICE MIDWIFE

## 2025-08-26 PROCEDURE — 3E033VJ INTRODUCTION OF OTHER HORMONE INTO PERIPHERAL VEIN, PERCUTANEOUS APPROACH: ICD-10-PCS | Performed by: OBSTETRICS & GYNECOLOGY

## 2025-08-26 PROCEDURE — 63600175 PHARM REV CODE 636 W HCPCS: Performed by: STUDENT IN AN ORGANIZED HEALTH CARE EDUCATION/TRAINING PROGRAM

## 2025-08-26 PROCEDURE — 86696 HERPES SIMPLEX TYPE 2 TEST: CPT | Performed by: ADVANCED PRACTICE MIDWIFE

## 2025-08-26 PROCEDURE — 86593 SYPHILIS TEST NON-TREP QUANT: CPT | Performed by: ADVANCED PRACTICE MIDWIFE

## 2025-08-26 PROCEDURE — 11000001 HC ACUTE MED/SURG PRIVATE ROOM

## 2025-08-26 PROCEDURE — 85025 COMPLETE CBC W/AUTO DIFF WBC: CPT

## 2025-08-26 RX ORDER — EPHEDRINE SULFATE 50 MG/ML
10 INJECTION, SOLUTION INTRAVENOUS
Status: DISCONTINUED | OUTPATIENT
Start: 2025-08-26 | End: 2025-08-26

## 2025-08-26 RX ORDER — OXYTOCIN-SODIUM CHLORIDE 0.9% IV SOLN 30 UNIT/500ML 30-0.9/5 UT/ML-%
95 SOLUTION INTRAVENOUS ONCE AS NEEDED
Status: DISCONTINUED | OUTPATIENT
Start: 2025-08-26 | End: 2025-08-28 | Stop reason: HOSPADM

## 2025-08-26 RX ORDER — SODIUM CHLORIDE, SODIUM LACTATE, POTASSIUM CHLORIDE, CALCIUM CHLORIDE 600; 310; 30; 20 MG/100ML; MG/100ML; MG/100ML; MG/100ML
INJECTION, SOLUTION INTRAVENOUS CONTINUOUS
Status: DISCONTINUED | OUTPATIENT
Start: 2025-08-26 | End: 2025-08-26

## 2025-08-26 RX ORDER — OXYTOCIN-SODIUM CHLORIDE 0.9% IV SOLN 30 UNIT/500ML 30-0.9/5 UT/ML-%
95 SOLUTION INTRAVENOUS CONTINUOUS PRN
Status: DISCONTINUED | OUTPATIENT
Start: 2025-08-26 | End: 2025-08-26

## 2025-08-26 RX ORDER — TRANEXAMIC ACID 10 MG/ML
1000 INJECTION, SOLUTION INTRAVENOUS EVERY 30 MIN PRN
Status: DISCONTINUED | OUTPATIENT
Start: 2025-08-26 | End: 2025-08-28 | Stop reason: HOSPADM

## 2025-08-26 RX ORDER — SODIUM CHLORIDE 0.9 % (FLUSH) 0.9 %
10 SYRINGE (ML) INJECTION
Status: DISCONTINUED | OUTPATIENT
Start: 2025-08-26 | End: 2025-08-28 | Stop reason: HOSPADM

## 2025-08-26 RX ORDER — OXYTOCIN 10 [USP'U]/ML
10 INJECTION, SOLUTION INTRAMUSCULAR; INTRAVENOUS ONCE AS NEEDED
Status: DISCONTINUED | OUTPATIENT
Start: 2025-08-26 | End: 2025-08-26

## 2025-08-26 RX ORDER — MISOPROSTOL 200 UG/1
800 TABLET ORAL ONCE AS NEEDED
Status: DISCONTINUED | OUTPATIENT
Start: 2025-08-26 | End: 2025-08-28 | Stop reason: HOSPADM

## 2025-08-26 RX ORDER — CARBOPROST TROMETHAMINE 250 UG/ML
250 INJECTION, SOLUTION INTRAMUSCULAR
Status: DISCONTINUED | OUTPATIENT
Start: 2025-08-26 | End: 2025-08-28 | Stop reason: HOSPADM

## 2025-08-26 RX ORDER — DOCUSATE SODIUM 100 MG/1
200 CAPSULE, LIQUID FILLED ORAL 2 TIMES DAILY PRN
Status: DISCONTINUED | OUTPATIENT
Start: 2025-08-26 | End: 2025-08-28 | Stop reason: HOSPADM

## 2025-08-26 RX ORDER — MIDAZOLAM HYDROCHLORIDE 1 MG/ML
INJECTION INTRAMUSCULAR; INTRAVENOUS
Status: DISCONTINUED | OUTPATIENT
Start: 2025-08-26 | End: 2025-08-26

## 2025-08-26 RX ORDER — OXYTOCIN-SODIUM CHLORIDE 0.9% IV SOLN 30 UNIT/500ML 30-0.9/5 UT/ML-%
95 SOLUTION INTRAVENOUS ONCE AS NEEDED
Status: COMPLETED | OUTPATIENT
Start: 2025-08-26 | End: 2025-08-26

## 2025-08-26 RX ORDER — TRANEXAMIC ACID 10 MG/ML
1000 INJECTION, SOLUTION INTRAVENOUS EVERY 30 MIN PRN
Status: DISCONTINUED | OUTPATIENT
Start: 2025-08-26 | End: 2025-08-26

## 2025-08-26 RX ORDER — OXYTOCIN-SODIUM CHLORIDE 0.9% IV SOLN 30 UNIT/500ML 30-0.9/5 UT/ML-%
10 SOLUTION INTRAVENOUS ONCE AS NEEDED
Status: COMPLETED | OUTPATIENT
Start: 2025-08-26 | End: 2025-08-26

## 2025-08-26 RX ORDER — DIPHENOXYLATE HYDROCHLORIDE AND ATROPINE SULFATE 2.5; .025 MG/1; MG/1
2 TABLET ORAL EVERY 6 HOURS PRN
Status: DISCONTINUED | OUTPATIENT
Start: 2025-08-26 | End: 2025-08-28 | Stop reason: HOSPADM

## 2025-08-26 RX ORDER — ROPIVACAINE HYDROCHLORIDE 2 MG/ML
12 INJECTION, SOLUTION EPIDURAL; INFILTRATION CONTINUOUS
Status: DISCONTINUED | OUTPATIENT
Start: 2025-08-26 | End: 2025-08-26

## 2025-08-26 RX ORDER — OXYTOCIN 10 [USP'U]/ML
10 INJECTION, SOLUTION INTRAMUSCULAR; INTRAVENOUS ONCE AS NEEDED
Status: DISCONTINUED | OUTPATIENT
Start: 2025-08-26 | End: 2025-08-28 | Stop reason: HOSPADM

## 2025-08-26 RX ORDER — ONDANSETRON 8 MG/1
8 TABLET, ORALLY DISINTEGRATING ORAL EVERY 8 HOURS PRN
Status: DISCONTINUED | OUTPATIENT
Start: 2025-08-26 | End: 2025-08-28 | Stop reason: HOSPADM

## 2025-08-26 RX ORDER — SIMETHICONE 80 MG
1 TABLET,CHEWABLE ORAL EVERY 6 HOURS PRN
Status: DISCONTINUED | OUTPATIENT
Start: 2025-08-26 | End: 2025-08-28 | Stop reason: HOSPADM

## 2025-08-26 RX ORDER — LIDOCAINE HYDROCHLORIDE 10 MG/ML
10 INJECTION, SOLUTION EPIDURAL; INFILTRATION; INTRACAUDAL; PERINEURAL ONCE AS NEEDED
Status: DISCONTINUED | OUTPATIENT
Start: 2025-08-26 | End: 2025-08-26

## 2025-08-26 RX ORDER — ACETAMINOPHEN 325 MG/1
650 TABLET ORAL EVERY 6 HOURS PRN
Status: DISCONTINUED | OUTPATIENT
Start: 2025-08-26 | End: 2025-08-28 | Stop reason: HOSPADM

## 2025-08-26 RX ORDER — MUPIROCIN 20 MG/G
OINTMENT TOPICAL
Status: DISCONTINUED | OUTPATIENT
Start: 2025-08-26 | End: 2025-08-26

## 2025-08-26 RX ORDER — SIMETHICONE 80 MG
1 TABLET,CHEWABLE ORAL 4 TIMES DAILY PRN
Status: DISCONTINUED | OUTPATIENT
Start: 2025-08-26 | End: 2025-08-26

## 2025-08-26 RX ORDER — BUPIVACAINE HYDROCHLORIDE 2.5 MG/ML
5 INJECTION, SOLUTION EPIDURAL; INFILTRATION; INTRACAUDAL; PERINEURAL
Status: DISCONTINUED | OUTPATIENT
Start: 2025-08-26 | End: 2025-08-26

## 2025-08-26 RX ORDER — MISOPROSTOL 200 UG/1
800 TABLET ORAL ONCE AS NEEDED
Status: DISCONTINUED | OUTPATIENT
Start: 2025-08-26 | End: 2025-08-26

## 2025-08-26 RX ORDER — PRENATAL WITH FERROUS FUM AND FOLIC ACID 3080; 920; 120; 400; 22; 1.84; 3; 20; 10; 1; 12; 200; 27; 25; 2 [IU]/1; [IU]/1; MG/1; [IU]/1; MG/1; MG/1; MG/1; MG/1; MG/1; MG/1; UG/1; MG/1; MG/1; MG/1; MG/1
1 TABLET ORAL DAILY
Status: DISCONTINUED | OUTPATIENT
Start: 2025-08-27 | End: 2025-08-28 | Stop reason: HOSPADM

## 2025-08-26 RX ORDER — OXYTOCIN-SODIUM CHLORIDE 0.9% IV SOLN 30 UNIT/500ML 30-0.9/5 UT/ML-%
30 SOLUTION INTRAVENOUS ONCE AS NEEDED
Status: DISCONTINUED | OUTPATIENT
Start: 2025-08-26 | End: 2025-08-26

## 2025-08-26 RX ORDER — DIPHENHYDRAMINE HYDROCHLORIDE 50 MG/ML
25 INJECTION, SOLUTION INTRAMUSCULAR; INTRAVENOUS EVERY 4 HOURS PRN
Status: DISCONTINUED | OUTPATIENT
Start: 2025-08-26 | End: 2025-08-28 | Stop reason: HOSPADM

## 2025-08-26 RX ORDER — OXYTOCIN-SODIUM CHLORIDE 0.9% IV SOLN 30 UNIT/500ML 30-0.9/5 UT/ML-%
0-32 SOLUTION INTRAVENOUS CONTINUOUS
Status: DISCONTINUED | OUTPATIENT
Start: 2025-08-26 | End: 2025-08-26

## 2025-08-26 RX ORDER — DIPHENOXYLATE HYDROCHLORIDE AND ATROPINE SULFATE 2.5; .025 MG/1; MG/1
2 TABLET ORAL EVERY 6 HOURS PRN
Status: DISCONTINUED | OUTPATIENT
Start: 2025-08-26 | End: 2025-08-26

## 2025-08-26 RX ORDER — LIDOCAINE HYDROCHLORIDE AND EPINEPHRINE 15; 5 MG/ML; UG/ML
INJECTION, SOLUTION EPIDURAL
Status: DISCONTINUED | OUTPATIENT
Start: 2025-08-26 | End: 2025-08-26

## 2025-08-26 RX ORDER — OXYTOCIN-SODIUM CHLORIDE 0.9% IV SOLN 30 UNIT/500ML 30-0.9/5 UT/ML-%
95 SOLUTION INTRAVENOUS CONTINUOUS PRN
Status: DISCONTINUED | OUTPATIENT
Start: 2025-08-26 | End: 2025-08-28 | Stop reason: HOSPADM

## 2025-08-26 RX ORDER — HYDROCORTISONE 25 MG/G
CREAM TOPICAL 3 TIMES DAILY PRN
Status: DISCONTINUED | OUTPATIENT
Start: 2025-08-26 | End: 2025-08-28 | Stop reason: HOSPADM

## 2025-08-26 RX ORDER — OXYTOCIN-SODIUM CHLORIDE 0.9% IV SOLN 30 UNIT/500ML 30-0.9/5 UT/ML-%
30 SOLUTION INTRAVENOUS ONCE AS NEEDED
Status: DISCONTINUED | OUTPATIENT
Start: 2025-08-26 | End: 2025-08-28 | Stop reason: HOSPADM

## 2025-08-26 RX ORDER — CARBOPROST TROMETHAMINE 250 UG/ML
250 INJECTION, SOLUTION INTRAMUSCULAR
Status: DISCONTINUED | OUTPATIENT
Start: 2025-08-26 | End: 2025-08-26

## 2025-08-26 RX ORDER — DIPHENHYDRAMINE HCL 25 MG
25 CAPSULE ORAL EVERY 4 HOURS PRN
Status: DISCONTINUED | OUTPATIENT
Start: 2025-08-26 | End: 2025-08-28 | Stop reason: HOSPADM

## 2025-08-26 RX ORDER — LANOLIN ALCOHOL/MO/W.PET/CERES
1 CREAM (GRAM) TOPICAL DAILY
Status: DISCONTINUED | OUTPATIENT
Start: 2025-08-27 | End: 2025-08-28 | Stop reason: HOSPADM

## 2025-08-26 RX ORDER — METHYLERGONOVINE MALEATE 0.2 MG/ML
200 INJECTION INTRAVENOUS ONCE AS NEEDED
Status: DISCONTINUED | OUTPATIENT
Start: 2025-08-26 | End: 2025-08-28 | Stop reason: HOSPADM

## 2025-08-26 RX ORDER — ONDANSETRON 8 MG/1
8 TABLET, ORALLY DISINTEGRATING ORAL EVERY 8 HOURS PRN
Status: DISCONTINUED | OUTPATIENT
Start: 2025-08-26 | End: 2025-08-26

## 2025-08-26 RX ORDER — TERBUTALINE SULFATE 1 MG/ML
0.25 INJECTION SUBCUTANEOUS
Status: DISCONTINUED | OUTPATIENT
Start: 2025-08-26 | End: 2025-08-26

## 2025-08-26 RX ORDER — CALCIUM CARBONATE 200(500)MG
500 TABLET,CHEWABLE ORAL 3 TIMES DAILY PRN
Status: DISCONTINUED | OUTPATIENT
Start: 2025-08-26 | End: 2025-08-26

## 2025-08-26 RX ORDER — IBUPROFEN 600 MG/1
600 TABLET, FILM COATED ORAL EVERY 6 HOURS
Status: DISCONTINUED | OUTPATIENT
Start: 2025-08-27 | End: 2025-08-28 | Stop reason: HOSPADM

## 2025-08-26 RX ORDER — HYDROCODONE BITARTRATE AND ACETAMINOPHEN 5; 325 MG/1; MG/1
1 TABLET ORAL EVERY 4 HOURS PRN
Status: DISCONTINUED | OUTPATIENT
Start: 2025-08-26 | End: 2025-08-28 | Stop reason: HOSPADM

## 2025-08-26 RX ORDER — METHYLERGONOVINE MALEATE 0.2 MG/ML
200 INJECTION INTRAVENOUS ONCE AS NEEDED
Status: DISCONTINUED | OUTPATIENT
Start: 2025-08-26 | End: 2025-08-26

## 2025-08-26 RX ADMIN — Medication 4 MILLI-UNITS/MIN: at 08:08

## 2025-08-26 RX ADMIN — ROPIVACAINE HYDROCHLORIDE 5 ML: 2 INJECTION, SOLUTION EPIDURAL; INFILTRATION at 06:08

## 2025-08-26 RX ADMIN — SODIUM CHLORIDE, POTASSIUM CHLORIDE, SODIUM LACTATE AND CALCIUM CHLORIDE: 600; 310; 30; 20 INJECTION, SOLUTION INTRAVENOUS at 02:08

## 2025-08-26 RX ADMIN — LIDOCAINE HYDROCHLORIDE,EPINEPHRINE BITARTRATE 3 ML: 15; .005 INJECTION, SOLUTION EPIDURAL; INFILTRATION; INTRACAUDAL; PERINEURAL at 06:08

## 2025-08-26 RX ADMIN — MISOPROSTOL 50 MCG: 100 TABLET ORAL at 10:08

## 2025-08-26 RX ADMIN — Medication 95 MILLI-UNITS/MIN: at 09:08

## 2025-08-26 RX ADMIN — MIDAZOLAM HYDROCHLORIDE 2 MG: 1 INJECTION, SOLUTION INTRAMUSCULAR; INTRAVENOUS at 05:08

## 2025-08-26 RX ADMIN — OXYTOCIN-SODIUM CHLORIDE 0.9% IV SOLN 30 UNIT/500ML 10 UNITS: 30-0.9/5 SOLUTION at 09:08

## 2025-08-26 RX ADMIN — IBUPROFEN 600 MG: 600 TABLET ORAL at 11:08

## 2025-08-26 RX ADMIN — Medication 4 MILLI-UNITS/MIN: at 02:08

## 2025-08-27 LAB
HSV1 IGG SERPL QL IA: POSITIVE
HSV2 IGG SERPL QL IA: NEGATIVE

## 2025-08-27 PROCEDURE — 11000001 HC ACUTE MED/SURG PRIVATE ROOM

## 2025-08-27 PROCEDURE — 99231 SBSQ HOSP IP/OBS SF/LOW 25: CPT | Mod: ,,, | Performed by: ADVANCED PRACTICE MIDWIFE

## 2025-08-27 PROCEDURE — 25000003 PHARM REV CODE 250: Performed by: ADVANCED PRACTICE MIDWIFE

## 2025-08-27 RX ORDER — ENOXAPARIN SODIUM 100 MG/ML
60 INJECTION SUBCUTANEOUS
Status: DISCONTINUED | OUTPATIENT
Start: 2025-08-27 | End: 2025-08-28 | Stop reason: HOSPADM

## 2025-08-27 RX ORDER — ENOXAPARIN SODIUM 100 MG/ML
40 INJECTION SUBCUTANEOUS EVERY 24 HOURS
Status: DISCONTINUED | OUTPATIENT
Start: 2025-08-27 | End: 2025-08-27

## 2025-08-27 RX ADMIN — DOCUSATE SODIUM 200 MG: 100 CAPSULE, LIQUID FILLED ORAL at 08:08

## 2025-08-27 RX ADMIN — IBUPROFEN 600 MG: 600 TABLET ORAL at 01:08

## 2025-08-27 RX ADMIN — HYDROCODONE BITARTRATE AND ACETAMINOPHEN 1 TABLET: 5; 325 TABLET ORAL at 01:08

## 2025-08-27 RX ADMIN — FERROUS SULFATE TAB 325 MG (65 MG ELEMENTAL FE) 1 EACH: 325 (65 FE) TAB at 08:08

## 2025-08-27 RX ADMIN — ACETAMINOPHEN 650 MG: 325 TABLET ORAL at 01:08

## 2025-08-27 RX ADMIN — PRENATAL VITAMINS-IRON FUMARATE 27 MG IRON-FOLIC ACID 0.8 MG TABLET 1 TABLET: at 08:08

## 2025-08-27 RX ADMIN — IBUPROFEN 600 MG: 600 TABLET ORAL at 08:08

## 2025-08-27 RX ADMIN — HYDROCODONE BITARTRATE AND ACETAMINOPHEN 1 TABLET: 5; 325 TABLET ORAL at 10:08

## 2025-08-27 RX ADMIN — ACETAMINOPHEN 650 MG: 325 TABLET ORAL at 08:08

## 2025-08-28 VITALS
SYSTOLIC BLOOD PRESSURE: 122 MMHG | TEMPERATURE: 98 F | WEIGHT: 293 LBS | HEIGHT: 62 IN | RESPIRATION RATE: 16 BRPM | DIASTOLIC BLOOD PRESSURE: 67 MMHG | HEART RATE: 81 BPM | BODY MASS INDEX: 53.92 KG/M2 | OXYGEN SATURATION: 100 %

## 2025-08-28 PROBLEM — O99.013 MATERNAL IRON DEFICIENCY ANEMIA AFFECTING PREGNANCY IN THIRD TRIMESTER, ANTEPARTUM: Status: RESOLVED | Noted: 2025-08-21 | Resolved: 2025-08-28

## 2025-08-28 PROBLEM — D50.9 MATERNAL IRON DEFICIENCY ANEMIA AFFECTING PREGNANCY IN THIRD TRIMESTER, ANTEPARTUM: Status: RESOLVED | Noted: 2025-08-21 | Resolved: 2025-08-28

## 2025-08-28 PROCEDURE — 99238 HOSP IP/OBS DSCHRG MGMT 30/<: CPT | Mod: ,,, | Performed by: MIDWIFE

## 2025-08-28 PROCEDURE — 25000003 PHARM REV CODE 250: Performed by: ADVANCED PRACTICE MIDWIFE

## 2025-08-28 RX ORDER — IBUPROFEN 600 MG/1
600 TABLET, FILM COATED ORAL EVERY 6 HOURS PRN
Qty: 30 TABLET | Refills: 0 | Status: SHIPPED | OUTPATIENT
Start: 2025-08-28

## 2025-08-28 RX ORDER — HYDROCODONE BITARTRATE AND ACETAMINOPHEN 5; 325 MG/1; MG/1
1 TABLET ORAL EVERY 8 HOURS PRN
Qty: 5 TABLET | Refills: 0 | Status: SHIPPED | OUTPATIENT
Start: 2025-08-28

## 2025-08-28 RX ADMIN — HYDROCODONE BITARTRATE AND ACETAMINOPHEN 1 TABLET: 5; 325 TABLET ORAL at 02:08

## 2025-08-28 RX ADMIN — IBUPROFEN 600 MG: 600 TABLET ORAL at 11:08

## 2025-08-28 RX ADMIN — ACETAMINOPHEN 650 MG: 325 TABLET ORAL at 05:08

## 2025-08-28 RX ADMIN — HYDROCODONE BITARTRATE AND ACETAMINOPHEN 1 TABLET: 5; 325 TABLET ORAL at 11:08

## 2025-08-28 RX ADMIN — FERROUS SULFATE TAB 325 MG (65 MG ELEMENTAL FE) 1 EACH: 325 (65 FE) TAB at 08:08

## 2025-08-28 RX ADMIN — IBUPROFEN 600 MG: 600 TABLET ORAL at 05:08

## 2025-08-28 RX ADMIN — PRENATAL VITAMINS-IRON FUMARATE 27 MG IRON-FOLIC ACID 0.8 MG TABLET 1 TABLET: at 08:08

## 2025-08-29 ENCOUNTER — PATIENT MESSAGE (OUTPATIENT)
Dept: INFUSION THERAPY | Facility: HOSPITAL | Age: 25
End: 2025-08-29
Payer: MEDICAID

## 2025-08-29 ENCOUNTER — TELEPHONE (OUTPATIENT)
Dept: INFUSION THERAPY | Facility: HOSPITAL | Age: 25
End: 2025-08-29
Payer: MEDICAID